# Patient Record
Sex: FEMALE | Race: BLACK OR AFRICAN AMERICAN | NOT HISPANIC OR LATINO | Employment: PART TIME | URBAN - METROPOLITAN AREA
[De-identification: names, ages, dates, MRNs, and addresses within clinical notes are randomized per-mention and may not be internally consistent; named-entity substitution may affect disease eponyms.]

---

## 2017-07-24 ENCOUNTER — HOSPITAL ENCOUNTER (EMERGENCY)
Facility: OTHER | Age: 32
Discharge: HOME | End: 2017-07-24
Attending: EMERGENCY MEDICINE
Payer: MEDICAID

## 2017-07-24 VITALS
HEIGHT: 65 IN | HEART RATE: 96 BPM | WEIGHT: 142 LBS | OXYGEN SATURATION: 99 % | RESPIRATION RATE: 18 BRPM | TEMPERATURE: 98 F | SYSTOLIC BLOOD PRESSURE: 142 MMHG | BODY MASS INDEX: 23.66 KG/M2 | DIASTOLIC BLOOD PRESSURE: 100 MMHG

## 2017-07-24 DIAGNOSIS — Z86.39 HISTORY OF GRAVES' DISEASE: ICD-10-CM

## 2017-07-24 DIAGNOSIS — G90.A POTS (POSTURAL ORTHOSTATIC TACHYCARDIA SYNDROME): ICD-10-CM

## 2017-07-24 DIAGNOSIS — R19.7 DIARRHEA, UNSPECIFIED TYPE: ICD-10-CM

## 2017-07-24 DIAGNOSIS — E87.6 HYPOKALEMIA: Primary | ICD-10-CM

## 2017-07-24 DIAGNOSIS — R07.89 CHEST WALL PAIN: ICD-10-CM

## 2017-07-24 LAB
ALBUMIN SERPL-MCNC: 4 G/DL (ref 3.3–5.5)
ALP SERPL-CCNC: 60 U/L (ref 42–141)
B-HCG UR QL: NEGATIVE
BILIRUB SERPL-MCNC: 1.8 MG/DL (ref 0.2–1.6)
BUN SERPL-MCNC: 13 MG/DL (ref 7–22)
CALCIUM SERPL-MCNC: 9.1 MG/DL (ref 8–10.3)
CHLORIDE SERPL-SCNC: 100 MMOL/L (ref 98–108)
CREAT SERPL-MCNC: 0.6 MG/DL (ref 0.6–1.2)
CTP QC/QA: YES
GLUCOSE SERPL-MCNC: 84 MG/DL (ref 73–118)
POC ALT (SGPT): 67 U/L (ref 10–47)
POC AST (SGOT): 73 U/L (ref 11–38)
POC TCO2: 24 MMOL/L (ref 18–33)
POTASSIUM BLD-SCNC: 3.5 MMOL/L (ref 3.6–5.1)
PROTEIN, POC: 7.6 G/DL (ref 6.4–8.1)
SODIUM BLD-SCNC: 135 MMOL/L (ref 128–145)
T4 FREE SERPL-MCNC: 1.44 NG/DL
TSH SERPL DL<=0.005 MIU/L-ACNC: <0.01 UIU/ML

## 2017-07-24 PROCEDURE — 80053 COMPREHEN METABOLIC PANEL: CPT

## 2017-07-24 PROCEDURE — 81025 URINE PREGNANCY TEST: CPT | Performed by: EMERGENCY MEDICINE

## 2017-07-24 PROCEDURE — 99284 EMERGENCY DEPT VISIT MOD MDM: CPT | Mod: 25

## 2017-07-24 PROCEDURE — 84439 ASSAY OF FREE THYROXINE: CPT

## 2017-07-24 PROCEDURE — 85025 COMPLETE CBC W/AUTO DIFF WBC: CPT

## 2017-07-24 PROCEDURE — 84443 ASSAY THYROID STIM HORMONE: CPT

## 2017-07-24 PROCEDURE — 25000003 PHARM REV CODE 250: Performed by: EMERGENCY MEDICINE

## 2017-07-24 PROCEDURE — 96360 HYDRATION IV INFUSION INIT: CPT

## 2017-07-24 PROCEDURE — 96361 HYDRATE IV INFUSION ADD-ON: CPT

## 2017-07-24 RX ORDER — POTASSIUM CHLORIDE 20 MEQ/1
20 TABLET, EXTENDED RELEASE ORAL DAILY
Qty: 5 TABLET | Refills: 0 | Status: SHIPPED | OUTPATIENT
Start: 2017-07-24 | End: 2017-07-29

## 2017-07-24 RX ORDER — BUTALBITAL, ACETAMINOPHEN AND CAFFEINE 50; 325; 40 MG/1; MG/1; MG/1
1 TABLET ORAL
Status: COMPLETED | OUTPATIENT
Start: 2017-07-24 | End: 2017-07-24

## 2017-07-24 RX ORDER — POTASSIUM CHLORIDE 20 MEQ/1
40 TABLET, EXTENDED RELEASE ORAL
Status: COMPLETED | OUTPATIENT
Start: 2017-07-24 | End: 2017-07-24

## 2017-07-24 RX ADMIN — BUTALBITAL, ACETAMINOPHEN, AND CAFFEINE 1 TABLET: 50; 325; 40 TABLET ORAL at 06:07

## 2017-07-24 RX ADMIN — SODIUM CHLORIDE, SODIUM LACTATE, POTASSIUM CHLORIDE, AND CALCIUM CHLORIDE 1000 ML: .6; .31; .03; .02 INJECTION, SOLUTION INTRAVENOUS at 05:07

## 2017-07-24 RX ADMIN — POTASSIUM CHLORIDE 40 MEQ: 1500 TABLET, EXTENDED RELEASE ORAL at 05:07

## 2017-07-24 RX ADMIN — SODIUM CHLORIDE, SODIUM LACTATE, POTASSIUM CHLORIDE, AND CALCIUM CHLORIDE 1000 ML: .6; .31; .03; .02 INJECTION, SOLUTION INTRAVENOUS at 04:07

## 2017-07-24 NOTE — ED PROVIDER NOTES
Encounter Date: 2017       History     Chief Complaint   Patient presents with    Diarrhea     reports three days, multiple compliant     31 y.o. female with past medical history of Graves' disease (has not been on prescription medicines for 2 years) presents to the emergency department complaining of acute diarrhea since Friday.  She reports having watery, nonbloody bowel movements nearly every hour since Friday.  She states she has had one meal since Friday and only a few sips of water he cut his eating and drinking worsens diarrhea.  She reports dizziness with standing since this morning.          Review of patient's allergies indicates:   Allergen Reactions    Vinyl ether Hives     Past Medical History:   Diagnosis Date    Thyroid disease     graves disease     Past Surgical History:   Procedure Laterality Date    BREAST SURGERY      Cysts removed     SECTION      fibroid removal  ,     breast    HEMANGIOMA EXCISION      right thigh     Family History   Problem Relation Age of Onset    Colon cancer Paternal Grandfather     Breast cancer Maternal Grandmother     Hypertension Maternal Grandmother     Hypertension Father     Throat cancer Father      labor Neg Hx     Stroke Neg Hx     Diabetes Neg Hx      Social History   Substance Use Topics    Smoking status: Never Smoker    Smokeless tobacco: Never Used    Alcohol use Yes      Comment: occasional     Review of Systems   Constitutional: Negative for chills and fever.   HENT: Negative.    Eyes: Negative.    Respiratory: Negative for cough, shortness of breath and stridor.    Cardiovascular: Negative for chest pain, palpitations and leg swelling.   Gastrointestinal: Positive for diarrhea. Negative for abdominal pain, nausea and vomiting.   Genitourinary: Negative for dysuria and hematuria.   Musculoskeletal: Negative.    Skin: Negative.    Neurological: Positive for dizziness. Negative for syncope and headaches.    Psychiatric/Behavioral: Negative.    All other systems reviewed and are negative.      Physical Exam     Initial Vitals   BP Pulse Resp Temp SpO2   07/24/17 1602 07/24/17 1602 07/24/17 1602 07/24/17 1604 07/24/17 1602   (!) 132/100 103 17 100.2 °F (37.9 °C) 98 %      MAP       07/24/17 1602       110.67         Physical Exam    Nursing note and vitals reviewed.  Constitutional: She appears well-developed and well-nourished. She is not diaphoretic. No distress.   HENT:   Head: Normocephalic and atraumatic.   Mouth/Throat: Oropharynx is clear and moist. No oropharyngeal exudate.   Eyes: EOM are normal. Pupils are equal, round, and reactive to light.   Neck: Normal range of motion. Neck supple.   Cardiovascular: Normal rate, regular rhythm and intact distal pulses.   No murmur heard.  Pulmonary/Chest: Breath sounds normal. No stridor. No respiratory distress.   Abdominal: Soft. Bowel sounds are normal. There is no tenderness.   Musculoskeletal: Normal range of motion. She exhibits no edema or tenderness.   Neurological: She is alert and oriented to person, place, and time. She has normal strength. No cranial nerve deficit.   Skin: Skin is warm and dry. No erythema. No pallor.   Psychiatric: She has a normal mood and affect.         ED Course   Procedures  Labs Reviewed   TSH - Abnormal; Notable for the following:        Result Value    TSH <0.010 (*)     All other components within normal limits   POCT CMP - Abnormal; Notable for the following:     ALT (SGPT), POC 67 (*)     AST (SGOT), POC 73 (*)     POC Creatinine 0.6 (*)     POC Potassium 3.5 (*)     Bilirubin 1.8 (*)     All other components within normal limits   T4, FREE   POCT URINE PREGNANCY   POCT CBC   POCT CMP             Medical Decision Making:   Clinical Tests:   Lab Tests: Ordered and Reviewed       <> Summary of Lab: White count 7.6 H&H 12.6 and 39.3 platelets 247 MCV 75 RDW 12.8  Radiological Study: Ordered and Reviewed                   ED Course      Labs Reviewed  Admission on 07/24/2017, Discharged on 07/24/2017   Component Date Value Ref Range Status    POC Preg Test, Ur 07/24/2017 Negative  Negative Final     Acceptable 07/24/2017 Yes   Final    TSH 07/24/2017 <0.010* 0.400 - 4.000 uIU/mL Final    Free T4 07/24/2017 1.44  0.71 - 1.51 ng/dL Final    Albumin, POC 07/24/2017 4.0  3.3 - 5.5 g/dL Final    Alkaline Phosphatase, POC 07/24/2017 60  42 - 141 U/L Final    ALT (SGPT), POC 07/24/2017 67* 10 - 47 U/L Final    AST (SGOT), POC 07/24/2017 73* 11 - 38 U/L Final    POC BUN 07/24/2017 13  7 - 22 mg/dL Final    Calcium, POC 07/24/2017 9.1  8.0 - 10.3 mg/dL Final    POC Chloride 07/24/2017 100  98 - 108 mmol/L Final    POC Creatinine 07/24/2017 0.6* 0.6 - 1.2 mg/dL Final    POC Glucose 07/24/2017 84  73 - 118 mg/dL Final    POC Potassium 07/24/2017 3.5* 3.6 - 5.1 mmol/L Final    POC Sodium 07/24/2017 135  128 - 145 mmol/L Final    Bilirubin 07/24/2017 1.8* 0.2 - 1.6 mg/dL Final    POC TCO2 07/24/2017 24  18 - 33 mmol/L Final    Protein 07/24/2017 7.6  6.4 - 8.1 g/dL Final        Imaging Reviewed    Imaging Results          X-Ray Chest PA And Lateral (Final result)  Result time 07/24/17 17:21:59    Final result by Gildardo Pete MD (07/24/17 17:21:59)                 Impression:        No evidence of acute intrathoracic disease.      Electronically signed by: GILDARDO PETE MD  Date:     07/24/17  Time:    17:21              Narrative:    Technique:  Chest PA and lateral radiographs    Comparison: None.    Findings:     Lung volumes are normal and symmetric. No pleural fluid or pneumothorax. The mediastinal structures are midline. The cardiac silhouette is normal in size. The osseous structures demonstrate no acute abnormality.                              Medications given in ED    Medications   lactated ringers bolus 1,000 mL (0 mLs Intravenous Stopped 7/24/17 5237)   lactated ringers bolus 1,000 mL (0 mLs Intravenous  Stopped 7/24/17 1846)   potassium chloride SA CR tablet 40 mEq (40 mEq Oral Given 7/24/17 7709)   butalbital-acetaminophen-caffeine -40 mg per tablet 1 tablet (1 tablet Oral Given 7/24/17 1843)     Discharge Medications     Discharge Medication List as of 7/24/2017  6:51 PM      START taking these medications    Details   potassium chloride SA (K-DUR,KLOR-CON) 20 MEQ tablet Take 1 tablet (20 mEq total) by mouth once daily., Starting Mon 7/24/2017, Until Sat 7/29/2017, Normal         CONTINUE these medications which have NOT CHANGED    Details   propylthiouracil (PTU) 50 mg Tab Take 1 tablet (50 mg total) by mouth once daily., Starting 5/5/2016, Until Discontinued, Normal                   Patient discharged to home in stable condition with instructions to:   1. Please take all meds as prescribed.  2. Follow-up with your primary care doctor   3. Return precautions discussed and patient and/or family/caretaker understands to return to the emergency room for any concerns including worsening of your current symptoms, fever, chills, night sweats, worsening pain, chest pain, shortness of breath, nausea, vomiting, diarrhea, bleeding, headache, difficulty talking, visual disturbances, weakness, numbness or any other acute concerns      Clinical Impression:   The primary encounter diagnosis was Hypokalemia. Diagnoses of Chest wall pain, Diarrhea, unspecified type, History of Graves' disease, and POTS (postural orthostatic tachycardia syndrome) were also pertinent to this visit.                           Brandon Negron MD  08/02/17 [id number]

## 2017-10-13 DIAGNOSIS — E05.00 GRAVES DISEASE: ICD-10-CM

## 2017-10-15 RX ORDER — PROPYLTHIOURACIL 50 MG/1
TABLET ORAL
Qty: 30 TABLET | Refills: 0 | Status: SHIPPED | OUTPATIENT
Start: 2017-10-15 | End: 2021-01-02 | Stop reason: ALTCHOICE

## 2018-03-09 ENCOUNTER — HOSPITAL ENCOUNTER (EMERGENCY)
Facility: OTHER | Age: 33
Discharge: HOME | End: 2018-03-09
Attending: EMERGENCY MEDICINE
Payer: MEDICAID

## 2018-03-09 VITALS
RESPIRATION RATE: 18 BRPM | TEMPERATURE: 99 F | DIASTOLIC BLOOD PRESSURE: 92 MMHG | BODY MASS INDEX: 24.16 KG/M2 | HEIGHT: 65 IN | OXYGEN SATURATION: 100 % | SYSTOLIC BLOOD PRESSURE: 137 MMHG | WEIGHT: 145 LBS | HEART RATE: 97 BPM

## 2018-03-09 DIAGNOSIS — K52.9 GASTROENTERITIS: Primary | ICD-10-CM

## 2018-03-09 DIAGNOSIS — B34.9 VIRAL SYNDROME: ICD-10-CM

## 2018-03-09 LAB
B-HCG UR QL: NEGATIVE
BILIRUBIN, POC UA: NEGATIVE
BLOOD, POC UA: ABNORMAL
CLARITY, POC UA: CLEAR
COLOR, POC UA: YELLOW
CTP QC/QA: YES
CTP QC/QA: YES
FLUAV AG NPH QL: NEGATIVE
FLUBV AG NPH QL: NEGATIVE
GLUCOSE, POC UA: NEGATIVE
KETONES, POC UA: ABNORMAL
LEUKOCYTE EST, POC UA: NEGATIVE
NITRITE, POC UA: NEGATIVE
PH UR STRIP: 6 [PH]
PROTEIN, POC UA: NEGATIVE
SPECIFIC GRAVITY, POC UA: 1.01
UROBILINOGEN, POC UA: 4 E.U./DL

## 2018-03-09 PROCEDURE — 25000003 PHARM REV CODE 250: Performed by: NURSE PRACTITIONER

## 2018-03-09 PROCEDURE — 81025 URINE PREGNANCY TEST: CPT | Performed by: EMERGENCY MEDICINE

## 2018-03-09 PROCEDURE — 99284 EMERGENCY DEPT VISIT MOD MDM: CPT

## 2018-03-09 PROCEDURE — 81003 URINALYSIS AUTO W/O SCOPE: CPT

## 2018-03-09 PROCEDURE — 87804 INFLUENZA ASSAY W/OPTIC: CPT

## 2018-03-09 RX ORDER — ONDANSETRON 4 MG/1
4 TABLET, ORALLY DISINTEGRATING ORAL
Status: COMPLETED | OUTPATIENT
Start: 2018-03-09 | End: 2018-03-09

## 2018-03-09 RX ORDER — DICYCLOMINE HYDROCHLORIDE 20 MG/1
20 TABLET ORAL EVERY 8 HOURS PRN
Qty: 20 TABLET | Refills: 0 | Status: SHIPPED | OUTPATIENT
Start: 2018-03-09 | End: 2021-01-02 | Stop reason: ALTCHOICE

## 2018-03-09 RX ORDER — BUTALBITAL, ACETAMINOPHEN AND CAFFEINE 50; 325; 40 MG/1; MG/1; MG/1
1 TABLET ORAL EVERY 6 HOURS PRN
Qty: 10 TABLET | Refills: 0 | Status: SHIPPED | OUTPATIENT
Start: 2018-03-09 | End: 2018-05-14

## 2018-03-09 RX ORDER — ONDANSETRON 4 MG/1
TABLET, ORALLY DISINTEGRATING ORAL
Status: DISPENSED
Start: 2018-03-09 | End: 2018-03-10

## 2018-03-09 RX ORDER — DICYCLOMINE HYDROCHLORIDE 20 MG/1
TABLET ORAL
Status: DISPENSED
Start: 2018-03-09 | End: 2018-03-10

## 2018-03-09 RX ORDER — METRONIDAZOLE 500 MG/1
500 TABLET ORAL 3 TIMES DAILY
Qty: 21 TABLET | Refills: 0 | Status: SHIPPED | OUTPATIENT
Start: 2018-03-09 | End: 2018-03-09 | Stop reason: CLARIF

## 2018-03-09 RX ORDER — ONDANSETRON 4 MG/1
4 TABLET, FILM COATED ORAL EVERY 6 HOURS PRN
Qty: 12 TABLET | Refills: 0 | Status: SHIPPED | OUTPATIENT
Start: 2018-03-09 | End: 2021-01-02 | Stop reason: ALTCHOICE

## 2018-03-09 RX ORDER — BUTALBITAL, ACETAMINOPHEN AND CAFFEINE 50; 325; 40 MG/1; MG/1; MG/1
1 TABLET ORAL
Status: COMPLETED | OUTPATIENT
Start: 2018-03-09 | End: 2018-03-09

## 2018-03-09 RX ORDER — DICYCLOMINE HYDROCHLORIDE 10 MG/1
20 CAPSULE ORAL
Status: COMPLETED | OUTPATIENT
Start: 2018-03-09 | End: 2018-03-09

## 2018-03-09 RX ORDER — BUTALBITAL, ACETAMINOPHEN AND CAFFEINE 50; 325; 40 MG/1; MG/1; MG/1
TABLET ORAL
Status: DISPENSED
Start: 2018-03-09 | End: 2018-03-10

## 2018-03-09 RX ADMIN — DICYCLOMINE HYDROCHLORIDE 20 MG: 10 CAPSULE ORAL at 04:03

## 2018-03-09 RX ADMIN — BUTALBITAL, ACETAMINOPHEN, AND CAFFEINE 1 TABLET: 50; 325; 40 TABLET ORAL at 05:03

## 2018-03-09 RX ADMIN — ONDANSETRON 4 MG: 4 TABLET, ORALLY DISINTEGRATING ORAL at 04:03

## 2018-03-09 NOTE — ED PROVIDER NOTES
"Encounter Date: 3/9/2018       History     Chief Complaint   Patient presents with    Diarrhea     pt states "I started having diarrhea and feeling nauseous yesterday" "I vomiting yesterday" pt denies taking any medication for symptoms    Headache     x 1 week    Generalized Body Aches     since yesterday    Abdominal Pain     x yesterday     The history is provided by the patient. No  was used.   Emesis    This is a new problem. The current episode started yesterday. The problem occurs 5 - 10 times per day. The problem has been unchanged. The emesis has an appearance of stomach contents and bilious material. Associated symptoms include abdominal pain (patient reports abdominal cramping began today along with the diarrhea.  Patient reports that the cramping is generalized and does not radiate.), diarrhea (Patient reports having loose stools beginning today approximately 2-4 times.) and headaches (Patient reports mild headache to the bilateral frontal region times one week that is similar to prior headaches without blurred vision or dizziness or chest pain or shortness of breath.). Pertinent negatives include no arthralgias, no chills, no cough, no fever, no myalgias, no sweats and no URI.     Review of patient's allergies indicates:   Allergen Reactions    Vinyl ether Hives     Past Medical History:   Diagnosis Date    Thyroid disease     graves disease     Past Surgical History:   Procedure Laterality Date    BREAST SURGERY      Cysts removed     SECTION      fibroid removal  ,     breast    HEMANGIOMA EXCISION      right thigh     Family History   Problem Relation Age of Onset    Colon cancer Paternal Grandfather     Breast cancer Maternal Grandmother     Hypertension Maternal Grandmother     Hypertension Father     Throat cancer Father      labor Neg Hx     Stroke Neg Hx     Diabetes Neg Hx      Social History   Substance Use Topics    Smoking " status: Never Smoker    Smokeless tobacco: Never Used    Alcohol use Yes      Comment: occasional     Review of Systems   Constitutional: Positive for fatigue. Negative for appetite change, chills, diaphoresis and fever.   HENT: Negative.  Negative for congestion, dental problem, postnasal drip, sinus pain, sinus pressure and sore throat.    Eyes: Negative.  Negative for pain, discharge, redness and itching.   Respiratory: Negative.  Negative for cough, shortness of breath and wheezing.    Cardiovascular: Negative.  Negative for chest pain and palpitations.   Gastrointestinal: Positive for abdominal pain (patient reports abdominal cramping began today along with the diarrhea.  Patient reports that the cramping is generalized and does not radiate.), diarrhea (Patient reports having loose stools beginning today approximately 2-4 times.) and vomiting. Negative for constipation and nausea.   Genitourinary: Negative.  Negative for difficulty urinating, dysuria, flank pain, menstrual problem, vaginal bleeding, vaginal discharge and vaginal pain.   Musculoskeletal: Negative.  Negative for arthralgias, back pain, myalgias and neck pain.   Skin: Negative.  Negative for rash.   Allergic/Immunologic: Negative.    Neurological: Positive for headaches (Patient reports mild headache to the bilateral frontal region times one week that is similar to prior headaches without blurred vision or dizziness or chest pain or shortness of breath.). Negative for dizziness, syncope, weakness and light-headedness.   Psychiatric/Behavioral: Negative.  Negative for hallucinations, self-injury and suicidal ideas.   All other systems reviewed and are negative.      Physical Exam     Initial Vitals [03/09/18 1606]   BP Pulse Resp Temp SpO2   (!) 126/96 92 18 99.3 °F (37.4 °C) 100 %      MAP       106         Physical Exam    Nursing note and vitals reviewed.  Constitutional: She appears well-developed and well-nourished. She is not diaphoretic.   Non-toxic appearance. She does not appear ill. No distress.   HENT:   Head: Normocephalic and atraumatic.   Eyes: Conjunctivae are normal. Right eye exhibits no discharge. Left eye exhibits no discharge.   Neck: Normal range of motion.   Cardiovascular: Normal rate, regular rhythm, normal heart sounds and intact distal pulses. Exam reveals no gallop and no friction rub.    No murmur heard.  Pulmonary/Chest: Breath sounds normal. No respiratory distress. She has no wheezes. She has no rhonchi. She has no rales. She exhibits no tenderness.   Abdominal: Soft. Normal appearance and bowel sounds are normal. She exhibits no shifting dullness, no distension, no pulsatile liver, no fluid wave, no abdominal bruit, no ascites, no pulsatile midline mass and no mass. There is no hepatosplenomegaly, splenomegaly or hepatomegaly. There is generalized tenderness. There is no rigidity, no rebound, no guarding, no CVA tenderness, no tenderness at McBurney's point and negative Tavares's sign. No hernia.   Musculoskeletal: Normal range of motion.   Neurological: She is alert and oriented to person, place, and time.   Skin: Skin is warm and dry. No rash noted.   Psychiatric: She has a normal mood and affect. Her behavior is normal. Judgment and thought content normal.         ED Course   Procedures  Labs Reviewed   POCT URINALYSIS W/O SCOPE - Abnormal; Notable for the following:        Result Value    Glucose, UA Negative (*)     Bilirubin, UA Negative (*)     Ketones, UA 2+ (*)     Blood, UA 1+ (*)     Protein, UA Negative (*)     Urobilinogen, UA 4.0 (*)     Nitrite, UA Negative (*)     Leukocytes, UA Negative (*)     All other components within normal limits   POCT URINE PREGNANCY   POCT URINALYSIS W/O SCOPE   POCT INFLUENZA A/B             Medical Decision Making:   Initial Assessment:   Gastroenteritis, viral syndrome  Differential Diagnosis:   Intractable nausea and vomiting, dehydration/orthostasis, UTI  Clinical Tests:   Lab  Tests: Ordered and Reviewed  The following lab test(s) were unremarkable: Urinalysis       <> Summary of Lab: Rapid flu test negative  ED Management:  Orthostatic vitals negative.  Patient given Bentyl by mouth, and Zofran by mouth and Fioricet by mouth.  Upon reassessment patient reports nausea and vomiting have resolved and abdominal cramping is improved and headache is also improved.  Patient still reports she has generalized body aches.  Patient will be discharged home on Bentyl and Zofran as well as Fioricet with instructions to rest, refrain from strenuous activity, eat a BRAT diet for the next 24-48 hours then advance to regular as tolerated, drink 6-8 glasses of water daily to prevent dehydration, and return to the ER as needed if symptoms worsen or fail to improve.  Patient is also instructed to take over-the-counter ibuprofen as needed for body aches.  Patient verbalized an understanding of discharge instructions and treatment plan.              Attending Attestation:     Physician Attestation Statement for NP/PA:   I reviewed the chart but I did not personally examine the patient. The face to face encounter was performed by the NP/PA.                     Clinical Impression:   The primary encounter diagnosis was Gastroenteritis. A diagnosis of Viral syndrome was also pertinent to this visit.                           Toussaint Battley III, FNP  03/09/18 1812       Sydnie Harrington MD  03/09/18 [id number]

## 2018-05-14 ENCOUNTER — HOSPITAL ENCOUNTER (EMERGENCY)
Facility: HOSPITAL | Age: 33
Discharge: HOME | End: 2018-05-14
Attending: EMERGENCY MEDICINE
Payer: MEDICAID

## 2018-05-14 VITALS
OXYGEN SATURATION: 100 % | DIASTOLIC BLOOD PRESSURE: 78 MMHG | HEART RATE: 99 BPM | RESPIRATION RATE: 20 BRPM | TEMPERATURE: 99 F | SYSTOLIC BLOOD PRESSURE: 136 MMHG

## 2018-05-14 DIAGNOSIS — J01.90 SUBACUTE SINUSITIS, UNSPECIFIED LOCATION: Primary | ICD-10-CM

## 2018-05-14 DIAGNOSIS — R51.9 NONINTRACTABLE HEADACHE, UNSPECIFIED CHRONICITY PATTERN, UNSPECIFIED HEADACHE TYPE: ICD-10-CM

## 2018-05-14 LAB
B-HCG UR QL: NEGATIVE
CTP QC/QA: YES
FLUAV AG NPH QL: NEGATIVE
FLUBV AG NPH QL: NEGATIVE
S PYO RRNA THROAT QL PROBE: NEGATIVE

## 2018-05-14 PROCEDURE — 81025 URINE PREGNANCY TEST: CPT | Performed by: EMERGENCY MEDICINE

## 2018-05-14 PROCEDURE — 25000003 PHARM REV CODE 250: Performed by: EMERGENCY MEDICINE

## 2018-05-14 PROCEDURE — 87880 STREP A ASSAY W/OPTIC: CPT

## 2018-05-14 PROCEDURE — 96372 THER/PROPH/DIAG INJ SC/IM: CPT

## 2018-05-14 PROCEDURE — 87804 INFLUENZA ASSAY W/OPTIC: CPT

## 2018-05-14 PROCEDURE — 63600175 PHARM REV CODE 636 W HCPCS: Performed by: EMERGENCY MEDICINE

## 2018-05-14 PROCEDURE — 99283 EMERGENCY DEPT VISIT LOW MDM: CPT | Mod: 25

## 2018-05-14 RX ORDER — ACETAMINOPHEN 325 MG/1
650 TABLET ORAL
Status: COMPLETED | OUTPATIENT
Start: 2018-05-14 | End: 2018-05-14

## 2018-05-14 RX ORDER — DEXAMETHASONE SODIUM PHOSPHATE 4 MG/ML
4 INJECTION, SOLUTION INTRA-ARTICULAR; INTRALESIONAL; INTRAMUSCULAR; INTRAVENOUS; SOFT TISSUE
Status: COMPLETED | OUTPATIENT
Start: 2018-05-14 | End: 2018-05-14

## 2018-05-14 RX ORDER — BUTALBITAL, ACETAMINOPHEN AND CAFFEINE 50; 325; 40 MG/1; MG/1; MG/1
1 TABLET ORAL EVERY 4 HOURS PRN
Qty: 15 TABLET | Refills: 0 | Status: SHIPPED | OUTPATIENT
Start: 2018-05-14 | End: 2018-06-13

## 2018-05-14 RX ORDER — LORATADINE 10 MG/1
10 TABLET ORAL DAILY
COMMUNITY
End: 2021-01-02 | Stop reason: ALTCHOICE

## 2018-05-14 RX ORDER — IBUPROFEN 200 MG
200 TABLET ORAL EVERY 6 HOURS PRN
COMMUNITY
End: 2021-01-02 | Stop reason: ALTCHOICE

## 2018-05-14 RX ORDER — AMOXICILLIN AND CLAVULANATE POTASSIUM 875; 125 MG/1; MG/1
1 TABLET, FILM COATED ORAL 2 TIMES DAILY
Qty: 20 TABLET | Refills: 0 | Status: SHIPPED | OUTPATIENT
Start: 2018-05-14 | End: 2018-05-24

## 2018-05-14 RX ORDER — KETOROLAC TROMETHAMINE 30 MG/ML
60 INJECTION, SOLUTION INTRAMUSCULAR; INTRAVENOUS
Status: COMPLETED | OUTPATIENT
Start: 2018-05-14 | End: 2018-05-14

## 2018-05-14 RX ADMIN — ACETAMINOPHEN 650 MG: 325 TABLET ORAL at 05:05

## 2018-05-14 RX ADMIN — KETOROLAC TROMETHAMINE 60 MG: 30 INJECTION, SOLUTION INTRAMUSCULAR; INTRAVENOUS at 05:05

## 2018-05-14 RX ADMIN — DEXAMETHASONE SODIUM PHOSPHATE 4 MG: 4 INJECTION, SOLUTION INTRAMUSCULAR; INTRAVENOUS at 05:05

## 2018-05-14 NOTE — ED PROVIDER NOTES
Encounter Date: 2018       History     Chief Complaint   Patient presents with    Headache     patient reports having a headache X1 month    Sore Throat     patient reports havong sore thoat X2 days, patient reports having a nasal drip     Nausea     Chief complaint:  Headache  32-year-old complains of pressure/headache to her forehead and sometimes her entire head.  This has been ongoing for over a month.  Patient reports sinus pressure as well as postnasal drip.  She has occasional nausea but no vomiting. No fever.  No photophobia.  Patient has been taking ibuprofen with some improvement.   she cannot elicit any aggravating factors for the headache.       The history is provided by the patient.     Review of patient's allergies indicates:   Allergen Reactions    Vinyl ether Hives     Past Medical History:   Diagnosis Date    Thyroid disease     graves disease     Past Surgical History:   Procedure Laterality Date    BREAST SURGERY      Cysts removed     SECTION      fibroid removal  ,     breast    HEMANGIOMA EXCISION  2004    right thigh     Family History   Problem Relation Age of Onset    Colon cancer Paternal Grandfather     Breast cancer Maternal Grandmother     Hypertension Maternal Grandmother     Hypertension Father     Throat cancer Father      labor Neg Hx     Stroke Neg Hx     Diabetes Neg Hx      Social History   Substance Use Topics    Smoking status: Never Smoker    Smokeless tobacco: Never Used    Alcohol use Yes      Comment: occasional     Review of Systems   Constitutional: Negative for fever.   HENT: Positive for congestion, postnasal drip, sinus pain and sinus pressure. Negative for sore throat.    Eyes: Negative for photophobia and visual disturbance.   Respiratory: Negative for shortness of breath.    Cardiovascular: Negative for chest pain.   Gastrointestinal: Positive for nausea.   Genitourinary: Negative for dysuria.   Musculoskeletal:  Negative for back pain and neck stiffness.   Skin: Negative for rash.   Neurological: Positive for headaches. Negative for weakness.       Physical Exam     Initial Vitals [05/14/18 1621]   BP Pulse Resp Temp SpO2   (!) 150/99 108 18 100.2 °F (37.9 °C) 100 %      MAP       116         Physical Exam    Nursing note and vitals reviewed.  Constitutional: She appears well-developed and well-nourished. No distress.   HENT:   Head: Normocephalic and atraumatic.   Right Ear: Tympanic membrane normal.   Left Ear: Tympanic membrane normal.   Nose: Mucosal edema present.   Eyes: Conjunctivae and EOM are normal. Pupils are equal, round, and reactive to light.   Neck: Trachea normal, normal range of motion and full passive range of motion without pain. Neck supple. No thyromegaly present. No neck rigidity.   Cardiovascular: Normal rate and regular rhythm.   Pulmonary/Chest: Breath sounds normal.   Abdominal: Soft. There is no tenderness. There is no rebound and no guarding.   Musculoskeletal: Normal range of motion.   Neurological: She is alert and oriented to person, place, and time. She has normal strength.   Skin: Skin is warm and dry.   Psychiatric: She has a normal mood and affect.         ED Course   Procedures  Labs Reviewed   POCT URINE PREGNANCY   POCT INFLUENZA A/B   POCT RAPID STREP A             Medical Decision Making:   Initial Assessment:   32-year-old who presents with headache and sinus pressure.  On exam patient does have tenderness over her sinuses.  Neck is supple.  She is in no distress  ED Management:  Patient will be treated for sinusitis as well as the headache.  Given Decadron and Toradol IM here.  Patient will be discharged on Augmentin.  She was advised to use Flonase and Zyrtec daily.                      Clinical Impression:   The primary encounter diagnosis was Subacute sinusitis, unspecified location. A diagnosis of Nonintractable headache, unspecified chronicity pattern, unspecified headache type  was also pertinent to this visit.                           Sydnie Harrington MD  05/14/18 [id number]

## 2018-09-14 ENCOUNTER — HOSPITAL ENCOUNTER (EMERGENCY)
Facility: HOSPITAL | Age: 33
Discharge: HOME | End: 2018-09-14
Attending: INTERNAL MEDICINE
Payer: MEDICAID

## 2018-09-14 VITALS
HEART RATE: 85 BPM | BODY MASS INDEX: 25.9 KG/M2 | OXYGEN SATURATION: 97 % | DIASTOLIC BLOOD PRESSURE: 94 MMHG | SYSTOLIC BLOOD PRESSURE: 147 MMHG | RESPIRATION RATE: 18 BRPM | HEIGHT: 67 IN | TEMPERATURE: 98 F | WEIGHT: 165 LBS

## 2018-09-14 DIAGNOSIS — H11.152 PINGUECULA OF LEFT EYE: Primary | ICD-10-CM

## 2018-09-14 PROCEDURE — 99283 EMERGENCY DEPT VISIT LOW MDM: CPT

## 2018-09-15 NOTE — ED PROVIDER NOTES
"Encounter Date: 2018       History     Chief Complaint   Patient presents with    left eye itching     pt c/o left eye "itching" and "gel" in eye, reports symptoms began today after use of contact lens last pm      31 y/o female which presents with left eye "gel." she went to a concert last night and then took her contacts out of her eyes around 2:30 this am. She woke up and noticed her eye felt funny and she had a gel look to her left eye. She denies drainage, eye pain, or change in vision. She denies fever, chills, chest pain or shortness of breath.       The history is provided by the patient.     Review of patient's allergies indicates:   Allergen Reactions    Vinyl ether Hives     Past Medical History:   Diagnosis Date    Thyroid disease     graves disease     Past Surgical History:   Procedure Laterality Date    BREAST SURGERY      Cysts removed     SECTION      fibroid removal  ,     breast    HEMANGIOMA EXCISION  2004    right thigh     Family History   Problem Relation Age of Onset    Colon cancer Paternal Grandfather     Breast cancer Maternal Grandmother     Hypertension Maternal Grandmother     Hypertension Father     Throat cancer Father      labor Neg Hx     Stroke Neg Hx     Diabetes Neg Hx      Social History     Tobacco Use    Smoking status: Never Smoker    Smokeless tobacco: Never Used   Substance Use Topics    Alcohol use: Yes     Comment: occasional    Drug use: No     Review of Systems   Constitutional: Negative for chills and fever.   HENT: Negative for drooling, ear pain, postnasal drip and rhinorrhea.    Eyes: Negative for photophobia, pain, discharge, redness, itching and visual disturbance.   Respiratory: Negative for chest tightness and shortness of breath.    Cardiovascular: Negative for chest pain.   All other systems reviewed and are negative.      Physical Exam     Initial Vitals [18]   BP Pulse Resp Temp SpO2   (!) 147/94 " "85 18 98.2 °F (36.8 °C) 97 %      MAP       --         Physical Exam    Nursing note and vitals reviewed.  Constitutional: She appears well-developed and well-nourished.   Eyes: EOM are normal. Pupils are equal, round, and reactive to light. Right eye exhibits no discharge. Left eye exhibits no discharge. No scleral icterus.   Thick, raised area of conjunctiva noted to the left lateral aspect of the eye- no erythema or drainage noted   Cardiovascular: Normal rate, regular rhythm, normal heart sounds and intact distal pulses. Exam reveals no gallop and no friction rub.    No murmur heard.  Pulmonary/Chest: Breath sounds normal. No respiratory distress. She has no wheezes. She has no rhonchi. She has no rales. She exhibits no tenderness.   Musculoskeletal: Normal range of motion. She exhibits no edema or tenderness.   Neurological: She is alert and oriented to person, place, and time. She has normal strength. GCS score is 15. GCS eye subscore is 4. GCS verbal subscore is 5. GCS motor subscore is 6.       ED Course   Procedures  Labs Reviewed - No data to display        Medical Decision Making:   Initial Assessment:   31 y/o female with "gel" to her eye. No other symptoms  Differential Diagnosis:   Corneal abrasion, corneal ulcer, conjunctivitis  ED Management:  Pt examined. Dr. Carrera asked to evaluate the patient. Through exam he diagnosed the patient with a pinguecula. The patient was given strict return precautions. The patient voiced understanding of all instructions.                       Clinical Impression:   The encounter diagnosis was Pinguecula of left eye.                             Rebekah Villaseñor, CORRINA  09/14/18 2134    "

## 2019-12-13 ENCOUNTER — TELEPHONE (OUTPATIENT)
Dept: TRANSPLANT | Facility: CLINIC | Age: 34
End: 2019-12-13

## 2019-12-13 DIAGNOSIS — Z00.5 EXAMINATION OF POTENTIAL DONOR OF ORGAN AND TISSUE: Primary | ICD-10-CM

## 2019-12-13 NOTE — TELEPHONE ENCOUNTER
Doreen Gibson called and stated that she is interested in becoming a living donor for her mom,Madhuri Rodriguez.  Medical and social history obtained.  No contraindications noted.  All questions answered.  Kidney donor information book emailed to patient for review. Instructed to contact me with questions or to schedule testing. Patient verbalized understanding.

## 2019-12-17 ENCOUNTER — LAB VISIT (OUTPATIENT)
Dept: LAB | Facility: HOSPITAL | Age: 34
End: 2019-12-17
Attending: NURSE PRACTITIONER

## 2019-12-17 DIAGNOSIS — Z00.5 EXAMINATION OF POTENTIAL DONOR OF ORGAN AND TISSUE: ICD-10-CM

## 2019-12-17 LAB — ABO + RH BLD: NORMAL

## 2019-12-17 PROCEDURE — 36415 COLL VENOUS BLD VENIPUNCTURE: CPT | Mod: PO,TXP

## 2019-12-17 PROCEDURE — 86901 BLOOD TYPING SEROLOGIC RH(D): CPT | Mod: TXP

## 2020-01-02 ENCOUNTER — TELEPHONE (OUTPATIENT)
Dept: TRANSPLANT | Facility: CLINIC | Age: 35
End: 2020-01-02

## 2020-01-02 NOTE — TELEPHONE ENCOUNTER
Patient notified that her blood type is O+, which is compatible with her mother. Scheduled for crossmatch on 1/6/20.

## 2020-01-06 ENCOUNTER — LAB VISIT (OUTPATIENT)
Dept: LAB | Facility: HOSPITAL | Age: 35
End: 2020-01-06
Attending: NURSE PRACTITIONER
Payer: COMMERCIAL

## 2020-01-06 DIAGNOSIS — Z00.5 EXAMINATION OF POTENTIAL DONOR OF ORGAN AND TISSUE: ICD-10-CM

## 2020-01-06 PROCEDURE — 81376 HLA II TYPING 1 LOCUS LR: CPT | Mod: 91,PO,TXP

## 2020-01-06 PROCEDURE — 81373 HLA I TYPING 1 LOCUS LR: CPT | Mod: 91,PO,TXP

## 2020-01-06 PROCEDURE — 81376 HLA II TYPING 1 LOCUS LR: CPT | Mod: PO,TXP,59

## 2020-01-06 PROCEDURE — 81373 HLA I TYPING 1 LOCUS LR: CPT | Mod: PO,TXP

## 2020-01-10 LAB — HLATY INTERPRETATION: NORMAL

## 2020-01-12 LAB
HLA DRB4 1: NORMAL
HLA SSO DNA TYPING CLASS I & II INTERPRETATION: NORMAL
HLA-A 1 SERO. EQUIV: 2
HLA-A 1: NORMAL
HLA-A 2 SERO. EQUIV: 30
HLA-A 2: NORMAL
HLA-B 1 SERO. EQUIV: 72
HLA-B 1: NORMAL
HLA-B 2 SERO. EQUIV: 58
HLA-B 2: NORMAL
HLA-BW 1 SERO. EQUIV: 6
HLA-BW 2 SERO. EQUIV: 4
HLA-C 1: NORMAL
HLA-C 2: NORMAL
HLA-CW 1 SERO. EQUIV: 2
HLA-CW 2 SERO. EQUIV: 10
HLA-DQ 1 SERO. EQUIV: 7
HLA-DQ 2 SERO. EQUIV: 5
HLA-DQB1 1: NORMAL
HLA-DQB1 2: NORMAL
HLA-DRB1 1 SERO. EQUIV: 8
HLA-DRB1 1: NORMAL
HLA-DRB1 2 SERO. EQUIV: 14
HLA-DRB1 2: NORMAL
HLA-DRB3 1: NORMAL
HLA-DRB3 2: NORMAL
HLA-DRB345 1 SERO. EQUIV: 52
HLA-DRB345 2 SERO. EQUIV: NORMAL
HLA-DRB4 2: NORMAL
HLA-DRB5 1: NORMAL
HLA-DRB5 2: NORMAL
SSDQB TESTING DATE: NORMAL
SSDRB TESTING DATE: NORMAL
SSOA TESTING DATE: NORMAL
SSOB TESTING DATE: NORMAL
SSOC TESTING DATE: NORMAL
SSODR TESTING DATE: NORMAL
TYSSO TESTING DATE: NORMAL

## 2020-01-13 ENCOUNTER — TELEPHONE (OUTPATIENT)
Dept: TRANSPLANT | Facility: CLINIC | Age: 35
End: 2020-01-13

## 2020-01-16 ENCOUNTER — TELEPHONE (OUTPATIENT)
Dept: TRANSPLANT | Facility: CLINIC | Age: 35
End: 2020-01-16

## 2020-01-16 NOTE — TELEPHONE ENCOUNTER
Patient notified that the results of the crossmatch with her mother shows that they are not compatible. Discussed the option of paired donation should the other donors not be approved. All questions were answered and patient verbalized understanding.

## 2021-04-15 ENCOUNTER — PATIENT MESSAGE (OUTPATIENT)
Dept: RESEARCH | Facility: HOSPITAL | Age: 36
End: 2021-04-15

## 2021-05-25 ENCOUNTER — CLINICAL SUPPORT (OUTPATIENT)
Dept: URGENT CARE | Facility: CLINIC | Age: 36
End: 2021-05-25
Payer: MEDICAID

## 2021-05-25 DIAGNOSIS — Z11.9 SPECIAL SCREENING EXAMINATION FOR INFECTIOUS DISEASES: Primary | ICD-10-CM

## 2021-05-25 LAB
CTP QC/QA: YES
SARS-COV-2 RDRP RESP QL NAA+PROBE: NEGATIVE

## 2021-05-25 PROCEDURE — U0002: ICD-10-PCS | Mod: QW,S$GLB,, | Performed by: PHYSICIAN ASSISTANT

## 2021-05-25 PROCEDURE — U0002 COVID-19 LAB TEST NON-CDC: HCPCS | Mod: QW,S$GLB,, | Performed by: PHYSICIAN ASSISTANT

## 2021-07-20 ENCOUNTER — CLINICAL SUPPORT (OUTPATIENT)
Dept: URGENT CARE | Facility: CLINIC | Age: 36
End: 2021-07-20
Payer: MEDICAID

## 2021-07-20 DIAGNOSIS — Z20.822 ENCOUNTER FOR LABORATORY TESTING FOR COVID-19 VIRUS: Primary | ICD-10-CM

## 2021-07-20 LAB
CTP QC/QA: YES
SARS-COV-2 RDRP RESP QL NAA+PROBE: NEGATIVE

## 2021-07-20 PROCEDURE — U0002: ICD-10-PCS | Mod: QW,S$GLB,, | Performed by: INTERNAL MEDICINE

## 2021-07-20 PROCEDURE — U0002 COVID-19 LAB TEST NON-CDC: HCPCS | Mod: QW,S$GLB,, | Performed by: INTERNAL MEDICINE

## 2021-08-23 ENCOUNTER — TELEPHONE (OUTPATIENT)
Dept: OBSTETRICS AND GYNECOLOGY | Facility: CLINIC | Age: 36
End: 2021-08-23

## 2021-08-23 ENCOUNTER — OFFICE VISIT (OUTPATIENT)
Dept: OBSTETRICS AND GYNECOLOGY | Facility: CLINIC | Age: 36
End: 2021-08-23
Payer: MEDICAID

## 2021-08-23 ENCOUNTER — HOSPITAL ENCOUNTER (OUTPATIENT)
Dept: RADIOLOGY | Facility: OTHER | Age: 36
Discharge: HOME | End: 2021-08-23
Attending: PHYSICIAN ASSISTANT
Payer: MEDICAID

## 2021-08-23 VITALS
BODY MASS INDEX: 30.27 KG/M2 | WEIGHT: 181.88 LBS | DIASTOLIC BLOOD PRESSURE: 82 MMHG | SYSTOLIC BLOOD PRESSURE: 118 MMHG

## 2021-08-23 DIAGNOSIS — Z32.01 POSITIVE PREGNANCY TEST: ICD-10-CM

## 2021-08-23 DIAGNOSIS — N93.9 ABNORMAL VAGINAL BLEEDING: ICD-10-CM

## 2021-08-23 DIAGNOSIS — N93.9 ABNORMAL VAGINAL BLEEDING: Primary | ICD-10-CM

## 2021-08-23 LAB
B-HCG UR QL: POSITIVE
CTP QC/QA: YES

## 2021-08-23 PROCEDURE — 81025 URINE PREGNANCY TEST: CPT | Mod: PBBFAC | Performed by: PHYSICIAN ASSISTANT

## 2021-08-23 PROCEDURE — 99999 PR PBB SHADOW E&M-EST. PATIENT-LVL II: CPT | Mod: PBBFAC,,, | Performed by: PHYSICIAN ASSISTANT

## 2021-08-23 PROCEDURE — 76817 TRANSVAGINAL US OBSTETRIC: CPT | Mod: 26,,, | Performed by: RADIOLOGY

## 2021-08-23 PROCEDURE — 99203 PR OFFICE/OUTPT VISIT, NEW, LEVL III, 30-44 MIN: ICD-10-PCS | Mod: S$PBB,TH,, | Performed by: PHYSICIAN ASSISTANT

## 2021-08-23 PROCEDURE — 87481 CANDIDA DNA AMP PROBE: CPT | Mod: 59 | Performed by: PHYSICIAN ASSISTANT

## 2021-08-23 PROCEDURE — 76817 TRANSVAGINAL US OBSTETRIC: CPT | Mod: TC

## 2021-08-23 PROCEDURE — 99212 OFFICE O/P EST SF 10 MIN: CPT | Mod: PBBFAC,TH,25 | Performed by: PHYSICIAN ASSISTANT

## 2021-08-23 PROCEDURE — 99203 OFFICE O/P NEW LOW 30 MIN: CPT | Mod: S$PBB,TH,, | Performed by: PHYSICIAN ASSISTANT

## 2021-08-23 PROCEDURE — 76817 US OB <14 WEEKS, TRANSABDOM & TRANSVAG, SINGLE GESTATION (XPD): ICD-10-PCS | Mod: 26,,, | Performed by: RADIOLOGY

## 2021-08-23 PROCEDURE — 99999 PR PBB SHADOW E&M-EST. PATIENT-LVL II: ICD-10-PCS | Mod: PBBFAC,,, | Performed by: PHYSICIAN ASSISTANT

## 2021-08-23 PROCEDURE — 76801 US OB <14 WEEKS, TRANSABDOM & TRANSVAG, SINGLE GESTATION (XPD): ICD-10-PCS | Mod: 26,,, | Performed by: RADIOLOGY

## 2021-08-23 PROCEDURE — 76801 OB US < 14 WKS SINGLE FETUS: CPT | Mod: 26,,, | Performed by: RADIOLOGY

## 2021-08-24 ENCOUNTER — INITIAL PRENATAL (OUTPATIENT)
Dept: OBSTETRICS AND GYNECOLOGY | Facility: CLINIC | Age: 36
End: 2021-08-24
Payer: MEDICAID

## 2021-08-24 VITALS — SYSTOLIC BLOOD PRESSURE: 130 MMHG | DIASTOLIC BLOOD PRESSURE: 88 MMHG

## 2021-08-24 DIAGNOSIS — N93.9 VAGINAL BLEEDING: ICD-10-CM

## 2021-08-24 DIAGNOSIS — Z32.01 POSITIVE PREGNANCY TEST: Primary | ICD-10-CM

## 2021-08-24 PROCEDURE — 99999 PR PBB SHADOW E&M-EST. PATIENT-LVL II: ICD-10-PCS | Mod: PBBFAC,,, | Performed by: OBSTETRICS & GYNECOLOGY

## 2021-08-24 PROCEDURE — 99213 PR OFFICE/OUTPT VISIT, EST, LEVL III, 20-29 MIN: ICD-10-PCS | Mod: S$PBB,TH,, | Performed by: OBSTETRICS & GYNECOLOGY

## 2021-08-24 PROCEDURE — 99999 PR PBB SHADOW E&M-EST. PATIENT-LVL II: CPT | Mod: PBBFAC,,, | Performed by: OBSTETRICS & GYNECOLOGY

## 2021-08-24 PROCEDURE — 87591 N.GONORRHOEAE DNA AMP PROB: CPT | Performed by: OBSTETRICS & GYNECOLOGY

## 2021-08-24 PROCEDURE — 99212 OFFICE O/P EST SF 10 MIN: CPT | Mod: PBBFAC,TH,PN | Performed by: OBSTETRICS & GYNECOLOGY

## 2021-08-24 PROCEDURE — 99213 OFFICE O/P EST LOW 20 MIN: CPT | Mod: S$PBB,TH,, | Performed by: OBSTETRICS & GYNECOLOGY

## 2021-08-24 PROCEDURE — 87491 CHLMYD TRACH DNA AMP PROBE: CPT | Performed by: OBSTETRICS & GYNECOLOGY

## 2021-08-25 ENCOUNTER — OFFICE VISIT (OUTPATIENT)
Dept: OBSTETRICS AND GYNECOLOGY | Facility: CLINIC | Age: 36
End: 2021-08-25
Payer: MEDICAID

## 2021-08-25 ENCOUNTER — TELEPHONE (OUTPATIENT)
Dept: OBSTETRICS AND GYNECOLOGY | Facility: CLINIC | Age: 36
End: 2021-08-25

## 2021-08-25 DIAGNOSIS — O20.0 THREATENED ABORTION: Primary | ICD-10-CM

## 2021-08-25 DIAGNOSIS — Z01.818 PRE-OP TESTING: Primary | ICD-10-CM

## 2021-08-25 DIAGNOSIS — O00.109 TUBAL ECTOPIC PREGNANCY, UNSPECIFIED LATERALITY, UNSPECIFIED WHETHER INTRAUTERINE PREGNANCY PRESENT: ICD-10-CM

## 2021-08-25 PROCEDURE — 99214 PR OFFICE/OUTPT VISIT, EST, LEVL IV, 30-39 MIN: ICD-10-PCS | Mod: TH,95,, | Performed by: OBSTETRICS & GYNECOLOGY

## 2021-08-25 PROCEDURE — 99214 OFFICE O/P EST MOD 30 MIN: CPT | Mod: TH,95,, | Performed by: OBSTETRICS & GYNECOLOGY

## 2021-08-26 ENCOUNTER — HOSPITAL ENCOUNTER (OUTPATIENT)
Dept: PREADMISSION TESTING | Facility: OTHER | Age: 36
Discharge: HOME | End: 2021-08-26
Attending: OBSTETRICS & GYNECOLOGY
Payer: MEDICAID

## 2021-08-26 ENCOUNTER — HOSPITAL ENCOUNTER (OUTPATIENT)
Dept: RADIOLOGY | Facility: OTHER | Age: 36
Discharge: HOME | End: 2021-08-26
Attending: OBSTETRICS & GYNECOLOGY
Payer: MEDICAID

## 2021-08-26 ENCOUNTER — ROUTINE PRENATAL (OUTPATIENT)
Dept: OBSTETRICS AND GYNECOLOGY | Facility: CLINIC | Age: 36
End: 2021-08-26
Payer: MEDICAID

## 2021-08-26 VITALS
WEIGHT: 179.88 LBS | SYSTOLIC BLOOD PRESSURE: 126 MMHG | BODY MASS INDEX: 29.94 KG/M2 | DIASTOLIC BLOOD PRESSURE: 80 MMHG

## 2021-08-26 DIAGNOSIS — O00.109 TUBAL ECTOPIC PREGNANCY, UNSPECIFIED LATERALITY, UNSPECIFIED WHETHER INTRAUTERINE PREGNANCY PRESENT: ICD-10-CM

## 2021-08-26 DIAGNOSIS — O20.0 THREATENED ABORTION: ICD-10-CM

## 2021-08-26 DIAGNOSIS — Z01.818 PRE-OP TESTING: ICD-10-CM

## 2021-08-26 DIAGNOSIS — O00.101 RIGHT TUBAL PREGNANCY WITHOUT INTRAUTERINE PREGNANCY: Primary | ICD-10-CM

## 2021-08-26 LAB
BACTERIAL VAGINOSIS DNA: POSITIVE
C TRACH DNA SPEC QL NAA+PROBE: NOT DETECTED
CANDIDA GLABRATA DNA: NEGATIVE
CANDIDA KRUSEI DNA: NEGATIVE
CANDIDA RRNA VAG QL PROBE: NEGATIVE
N GONORRHOEA DNA SPEC QL NAA+PROBE: NOT DETECTED
SARS-COV-2 RDRP RESP QL NAA+PROBE: NEGATIVE
T VAGINALIS RRNA GENITAL QL PROBE: NEGATIVE

## 2021-08-26 PROCEDURE — 99999 PR PBB SHADOW E&M-EST. PATIENT-LVL II: ICD-10-PCS | Mod: PBBFAC,,, | Performed by: OBSTETRICS & GYNECOLOGY

## 2021-08-26 PROCEDURE — 99214 OFFICE O/P EST MOD 30 MIN: CPT | Mod: S$PBB,TH,, | Performed by: OBSTETRICS & GYNECOLOGY

## 2021-08-26 PROCEDURE — 76817 TRANSVAGINAL US OBSTETRIC: CPT | Mod: TC

## 2021-08-26 PROCEDURE — U0002 COVID-19 LAB TEST NON-CDC: HCPCS | Performed by: OBSTETRICS & GYNECOLOGY

## 2021-08-26 PROCEDURE — 76801 OB US < 14 WKS SINGLE FETUS: CPT | Mod: 26,,, | Performed by: RADIOLOGY

## 2021-08-26 PROCEDURE — 76817 TRANSVAGINAL US OBSTETRIC: CPT | Mod: 26,,, | Performed by: RADIOLOGY

## 2021-08-26 PROCEDURE — 76817 US OB <14 WEEKS, TRANSABDOM & TRANSVAG, SINGLE GESTATION (XPD): ICD-10-PCS | Mod: 26,,, | Performed by: RADIOLOGY

## 2021-08-26 PROCEDURE — 99212 OFFICE O/P EST SF 10 MIN: CPT | Mod: PBBFAC,25,TH,PN | Performed by: OBSTETRICS & GYNECOLOGY

## 2021-08-26 PROCEDURE — 99999 PR PBB SHADOW E&M-EST. PATIENT-LVL II: CPT | Mod: PBBFAC,,, | Performed by: OBSTETRICS & GYNECOLOGY

## 2021-08-26 PROCEDURE — 99214 PR OFFICE/OUTPT VISIT, EST, LEVL IV, 30-39 MIN: ICD-10-PCS | Mod: S$PBB,TH,, | Performed by: OBSTETRICS & GYNECOLOGY

## 2021-08-26 PROCEDURE — 76801 US OB <14 WEEKS, TRANSABDOM & TRANSVAG, SINGLE GESTATION (XPD): ICD-10-PCS | Mod: 26,,, | Performed by: RADIOLOGY

## 2021-08-26 RX ORDER — HYDROCODONE BITARTRATE AND ACETAMINOPHEN 5; 325 MG/1; MG/1
1 TABLET ORAL EVERY 6 HOURS PRN
Qty: 30 TABLET | Refills: 0 | Status: ON HOLD | OUTPATIENT
Start: 2021-08-26 | End: 2021-08-27 | Stop reason: HOSPADM

## 2021-08-26 RX ORDER — MUPIROCIN 20 MG/G
OINTMENT TOPICAL
Status: CANCELLED | OUTPATIENT
Start: 2021-08-26

## 2021-08-26 RX ORDER — SODIUM CHLORIDE 9 MG/ML
INJECTION, SOLUTION INTRAVENOUS CONTINUOUS
Status: CANCELLED | OUTPATIENT
Start: 2021-08-26

## 2021-08-27 ENCOUNTER — ANESTHESIA EVENT (OUTPATIENT)
Dept: SURGERY | Facility: OTHER | Age: 36
End: 2021-08-27
Payer: MEDICAID

## 2021-08-27 ENCOUNTER — HOSPITAL ENCOUNTER (OUTPATIENT)
Facility: OTHER | Age: 36
Discharge: HOME | End: 2021-08-27
Attending: OBSTETRICS & GYNECOLOGY | Admitting: OBSTETRICS & GYNECOLOGY
Payer: MEDICAID

## 2021-08-27 ENCOUNTER — TELEPHONE (OUTPATIENT)
Dept: CARDIOLOGY | Facility: CLINIC | Age: 36
End: 2021-08-27

## 2021-08-27 ENCOUNTER — ANESTHESIA (OUTPATIENT)
Dept: SURGERY | Facility: OTHER | Age: 36
End: 2021-08-27
Payer: MEDICAID

## 2021-08-27 VITALS
RESPIRATION RATE: 16 BRPM | HEART RATE: 103 BPM | DIASTOLIC BLOOD PRESSURE: 80 MMHG | SYSTOLIC BLOOD PRESSURE: 132 MMHG | OXYGEN SATURATION: 100 % | TEMPERATURE: 98 F

## 2021-08-27 DIAGNOSIS — Z90.79 HX OF UNILATERAL SALPINGECTOMY: Primary | ICD-10-CM

## 2021-08-27 DIAGNOSIS — O00.101 RIGHT TUBAL PREGNANCY WITHOUT INTRAUTERINE PREGNANCY: ICD-10-CM

## 2021-08-27 DIAGNOSIS — I49.8 BIGEMINY: ICD-10-CM

## 2021-08-27 LAB
ABO + RH BLD: NORMAL
ANION GAP SERPL CALC-SCNC: 12 MMOL/L (ref 8–16)
BASOPHILS # BLD AUTO: 0.03 K/UL (ref 0–0.2)
BASOPHILS NFR BLD: 0.6 % (ref 0–1.9)
BLD GP AB SCN CELLS X3 SERPL QL: NORMAL
BUN SERPL-MCNC: 10 MG/DL (ref 6–20)
CALCIUM SERPL-MCNC: 9.1 MG/DL (ref 8.7–10.5)
CHLORIDE SERPL-SCNC: 104 MMOL/L (ref 95–110)
CO2 SERPL-SCNC: 21 MMOL/L (ref 23–29)
CREAT SERPL-MCNC: 0.7 MG/DL (ref 0.5–1.4)
DIFFERENTIAL METHOD: ABNORMAL
EOSINOPHIL # BLD AUTO: 0.2 K/UL (ref 0–0.5)
EOSINOPHIL NFR BLD: 3.9 % (ref 0–8)
ERYTHROCYTE [DISTWIDTH] IN BLOOD BY AUTOMATED COUNT: 14 % (ref 11.5–14.5)
EST. GFR  (AFRICAN AMERICAN): >60 ML/MIN/1.73 M^2
EST. GFR  (NON AFRICAN AMERICAN): >60 ML/MIN/1.73 M^2
GLUCOSE SERPL-MCNC: 103 MG/DL (ref 70–110)
HCT VFR BLD AUTO: 37.4 % (ref 37–48.5)
HGB BLD-MCNC: 11.8 G/DL (ref 12–16)
IMM GRANULOCYTES # BLD AUTO: 0.01 K/UL (ref 0–0.04)
IMM GRANULOCYTES NFR BLD AUTO: 0.2 % (ref 0–0.5)
LYMPHOCYTES # BLD AUTO: 2.2 K/UL (ref 1–4.8)
LYMPHOCYTES NFR BLD: 39.6 % (ref 18–48)
MCH RBC QN AUTO: 26.1 PG (ref 27–31)
MCHC RBC AUTO-ENTMCNC: 31.6 G/DL (ref 32–36)
MCV RBC AUTO: 83 FL (ref 82–98)
MONOCYTES # BLD AUTO: 0.5 K/UL (ref 0.3–1)
MONOCYTES NFR BLD: 9.4 % (ref 4–15)
NEUTROPHILS # BLD AUTO: 2.5 K/UL (ref 1.8–7.7)
NEUTROPHILS NFR BLD: 46.3 % (ref 38–73)
NRBC BLD-RTO: 0 /100 WBC
PLATELET # BLD AUTO: 328 K/UL (ref 150–450)
PMV BLD AUTO: 9.9 FL (ref 9.2–12.9)
POTASSIUM SERPL-SCNC: 4.2 MMOL/L (ref 3.5–5.1)
RBC # BLD AUTO: 4.52 M/UL (ref 4–5.4)
SODIUM SERPL-SCNC: 137 MMOL/L (ref 136–145)
T4 FREE SERPL-MCNC: 1.26 NG/DL (ref 0.71–1.51)
TSH SERPL DL<=0.005 MIU/L-ACNC: 0.96 UIU/ML (ref 0.4–4)
WBC # BLD AUTO: 5.43 K/UL (ref 3.9–12.7)

## 2021-08-27 PROCEDURE — 37000009 HC ANESTHESIA EA ADD 15 MINS: Performed by: OBSTETRICS & GYNECOLOGY

## 2021-08-27 PROCEDURE — 63600175 PHARM REV CODE 636 W HCPCS: Performed by: ANESTHESIOLOGY

## 2021-08-27 PROCEDURE — 25000003 PHARM REV CODE 250: Performed by: OBSTETRICS & GYNECOLOGY

## 2021-08-27 PROCEDURE — 25000003 PHARM REV CODE 250: Performed by: REGISTERED NURSE

## 2021-08-27 PROCEDURE — 71000039 HC RECOVERY, EACH ADD'L HOUR: Performed by: OBSTETRICS & GYNECOLOGY

## 2021-08-27 PROCEDURE — 27201423 OPTIME MED/SURG SUP & DEVICES STERILE SUPPLY: Performed by: OBSTETRICS & GYNECOLOGY

## 2021-08-27 PROCEDURE — 84439 ASSAY OF FREE THYROXINE: CPT | Performed by: OBSTETRICS & GYNECOLOGY

## 2021-08-27 PROCEDURE — 71000015 HC POSTOP RECOV 1ST HR: Performed by: OBSTETRICS & GYNECOLOGY

## 2021-08-27 PROCEDURE — 58120 DILATION AND CURETTAGE: CPT | Mod: 51,,, | Performed by: OBSTETRICS & GYNECOLOGY

## 2021-08-27 PROCEDURE — 93010 EKG 12-LEAD: ICD-10-PCS | Mod: ,,, | Performed by: INTERNAL MEDICINE

## 2021-08-27 PROCEDURE — 63600175 PHARM REV CODE 636 W HCPCS: Performed by: REGISTERED NURSE

## 2021-08-27 PROCEDURE — 88305 TISSUE EXAM BY PATHOLOGIST: CPT | Mod: 26,,, | Performed by: PATHOLOGY

## 2021-08-27 PROCEDURE — 88302 TISSUE EXAM BY PATHOLOGIST: CPT | Mod: 26,,, | Performed by: PATHOLOGY

## 2021-08-27 PROCEDURE — 00840 ANES IPER PX LOWER ABD NOS: CPT | Performed by: OBSTETRICS & GYNECOLOGY

## 2021-08-27 PROCEDURE — 58120 PR DILATION/CURETTAGE,DIAGNOSTIC: ICD-10-PCS | Mod: 51,,, | Performed by: OBSTETRICS & GYNECOLOGY

## 2021-08-27 PROCEDURE — 88305 TISSUE EXAM BY PATHOLOGIST: ICD-10-PCS | Mod: 26,,, | Performed by: PATHOLOGY

## 2021-08-27 PROCEDURE — 84443 ASSAY THYROID STIM HORMONE: CPT

## 2021-08-27 PROCEDURE — 88302 TISSUE EXAM BY PATHOLOGIST: CPT | Performed by: PATHOLOGY

## 2021-08-27 PROCEDURE — 93010 ELECTROCARDIOGRAM REPORT: CPT | Mod: ,,, | Performed by: INTERNAL MEDICINE

## 2021-08-27 PROCEDURE — 88305 TISSUE EXAM BY PATHOLOGIST: CPT | Performed by: PATHOLOGY

## 2021-08-27 PROCEDURE — 25000003 PHARM REV CODE 250: Performed by: ANESTHESIOLOGY

## 2021-08-27 PROCEDURE — 36415 COLL VENOUS BLD VENIPUNCTURE: CPT | Performed by: OBSTETRICS & GYNECOLOGY

## 2021-08-27 PROCEDURE — 80048 BASIC METABOLIC PNL TOTAL CA: CPT | Performed by: OBSTETRICS & GYNECOLOGY

## 2021-08-27 PROCEDURE — 36000709 HC OR TIME LEV III EA ADD 15 MIN: Performed by: OBSTETRICS & GYNECOLOGY

## 2021-08-27 PROCEDURE — 86900 BLOOD TYPING SEROLOGIC ABO: CPT | Performed by: OBSTETRICS & GYNECOLOGY

## 2021-08-27 PROCEDURE — 93005 ELECTROCARDIOGRAM TRACING: CPT

## 2021-08-27 PROCEDURE — 85025 COMPLETE CBC W/AUTO DIFF WBC: CPT | Performed by: OBSTETRICS & GYNECOLOGY

## 2021-08-27 PROCEDURE — 36000708 HC OR TIME LEV III 1ST 15 MIN: Performed by: OBSTETRICS & GYNECOLOGY

## 2021-08-27 PROCEDURE — 36415 COLL VENOUS BLD VENIPUNCTURE: CPT

## 2021-08-27 PROCEDURE — 25000003 PHARM REV CODE 250

## 2021-08-27 PROCEDURE — 58661 LAPAROSCOPY REMOVE ADNEXA: CPT | Mod: RT,,, | Performed by: OBSTETRICS & GYNECOLOGY

## 2021-08-27 PROCEDURE — 37000008 HC ANESTHESIA 1ST 15 MINUTES: Performed by: OBSTETRICS & GYNECOLOGY

## 2021-08-27 PROCEDURE — 71000033 HC RECOVERY, INTIAL HOUR: Performed by: OBSTETRICS & GYNECOLOGY

## 2021-08-27 PROCEDURE — 58661 PR LAP,RMV  ADNEXAL STRUCTURE: ICD-10-PCS | Mod: RT,,, | Performed by: OBSTETRICS & GYNECOLOGY

## 2021-08-27 PROCEDURE — S0030 INJECTION, METRONIDAZOLE: HCPCS

## 2021-08-27 PROCEDURE — 88302 PR  SURG PATH,LEVEL II: ICD-10-PCS | Mod: 26,,, | Performed by: PATHOLOGY

## 2021-08-27 PROCEDURE — 71000016 HC POSTOP RECOV ADDL HR: Performed by: OBSTETRICS & GYNECOLOGY

## 2021-08-27 RX ORDER — METRONIDAZOLE 500 MG/100ML
500 INJECTION, SOLUTION INTRAVENOUS
Status: DISCONTINUED | OUTPATIENT
Start: 2021-08-27 | End: 2021-08-27 | Stop reason: HOSPADM

## 2021-08-27 RX ORDER — ONDANSETRON 2 MG/ML
INJECTION INTRAMUSCULAR; INTRAVENOUS
Status: DISCONTINUED | OUTPATIENT
Start: 2021-08-27 | End: 2021-08-27

## 2021-08-27 RX ORDER — BUPIVACAINE HYDROCHLORIDE 2.5 MG/ML
INJECTION, SOLUTION EPIDURAL; INFILTRATION; INTRACAUDAL
Status: DISCONTINUED | OUTPATIENT
Start: 2021-08-27 | End: 2021-08-27 | Stop reason: HOSPADM

## 2021-08-27 RX ORDER — METRONIDAZOLE 500 MG/100ML
500 INJECTION, SOLUTION INTRAVENOUS ONCE
Status: DISCONTINUED | OUTPATIENT
Start: 2021-08-27 | End: 2021-08-27

## 2021-08-27 RX ORDER — IBUPROFEN 600 MG/1
600 TABLET ORAL EVERY 6 HOURS PRN
Qty: 30 TABLET | Refills: 1 | Status: SHIPPED | OUTPATIENT
Start: 2021-08-27 | End: 2021-09-30

## 2021-08-27 RX ORDER — METRONIDAZOLE 500 MG/100ML
500 INJECTION, SOLUTION INTRAVENOUS
Status: DISCONTINUED | OUTPATIENT
Start: 2021-08-27 | End: 2021-08-27

## 2021-08-27 RX ORDER — DEXAMETHASONE SODIUM PHOSPHATE 4 MG/ML
INJECTION, SOLUTION INTRA-ARTICULAR; INTRALESIONAL; INTRAMUSCULAR; INTRAVENOUS; SOFT TISSUE
Status: DISCONTINUED | OUTPATIENT
Start: 2021-08-27 | End: 2021-08-27

## 2021-08-27 RX ORDER — SODIUM CHLORIDE 0.9 % (FLUSH) 0.9 %
3 SYRINGE (ML) INJECTION
Status: DISCONTINUED | OUTPATIENT
Start: 2021-08-27 | End: 2021-08-27 | Stop reason: HOSPADM

## 2021-08-27 RX ORDER — OXYCODONE HYDROCHLORIDE 5 MG/1
5 TABLET ORAL
Status: DISCONTINUED | OUTPATIENT
Start: 2021-08-27 | End: 2021-08-27 | Stop reason: HOSPADM

## 2021-08-27 RX ORDER — MIDAZOLAM HYDROCHLORIDE 1 MG/ML
INJECTION INTRAMUSCULAR; INTRAVENOUS
Status: DISCONTINUED | OUTPATIENT
Start: 2021-08-27 | End: 2021-08-27

## 2021-08-27 RX ORDER — DIPHENHYDRAMINE HYDROCHLORIDE 50 MG/ML
INJECTION INTRAMUSCULAR; INTRAVENOUS
Status: DISCONTINUED | OUTPATIENT
Start: 2021-08-27 | End: 2021-08-27

## 2021-08-27 RX ORDER — PROPOFOL 10 MG/ML
VIAL (ML) INTRAVENOUS
Status: DISCONTINUED | OUTPATIENT
Start: 2021-08-27 | End: 2021-08-27

## 2021-08-27 RX ORDER — KETOROLAC TROMETHAMINE 30 MG/ML
INJECTION, SOLUTION INTRAMUSCULAR; INTRAVENOUS
Status: DISCONTINUED | OUTPATIENT
Start: 2021-08-27 | End: 2021-08-27

## 2021-08-27 RX ORDER — ACETAMINOPHEN 10 MG/ML
INJECTION, SOLUTION INTRAVENOUS
Status: DISCONTINUED | OUTPATIENT
Start: 2021-08-27 | End: 2021-08-27

## 2021-08-27 RX ORDER — NEOSTIGMINE METHYLSULFATE 1 MG/ML
INJECTION, SOLUTION INTRAVENOUS
Status: DISCONTINUED | OUTPATIENT
Start: 2021-08-27 | End: 2021-08-27

## 2021-08-27 RX ORDER — ONDANSETRON 2 MG/ML
4 INJECTION INTRAMUSCULAR; INTRAVENOUS DAILY PRN
Status: DISCONTINUED | OUTPATIENT
Start: 2021-08-27 | End: 2021-08-27 | Stop reason: HOSPADM

## 2021-08-27 RX ORDER — SODIUM CHLORIDE 9 MG/ML
INJECTION, SOLUTION INTRAVENOUS CONTINUOUS
Status: DISCONTINUED | OUTPATIENT
Start: 2021-08-27 | End: 2021-08-27 | Stop reason: HOSPADM

## 2021-08-27 RX ORDER — LIDOCAINE HCL/PF 100 MG/5ML
SYRINGE (ML) INTRAVENOUS
Status: DISCONTINUED | OUTPATIENT
Start: 2021-08-27 | End: 2021-08-27

## 2021-08-27 RX ORDER — FENTANYL CITRATE 50 UG/ML
INJECTION, SOLUTION INTRAMUSCULAR; INTRAVENOUS
Status: DISCONTINUED | OUTPATIENT
Start: 2021-08-27 | End: 2021-08-27

## 2021-08-27 RX ORDER — HYDROMORPHONE HYDROCHLORIDE 2 MG/ML
0.4 INJECTION, SOLUTION INTRAMUSCULAR; INTRAVENOUS; SUBCUTANEOUS EVERY 5 MIN PRN
Status: DISCONTINUED | OUTPATIENT
Start: 2021-08-27 | End: 2021-08-27 | Stop reason: HOSPADM

## 2021-08-27 RX ORDER — ROCURONIUM BROMIDE 10 MG/ML
INJECTION, SOLUTION INTRAVENOUS
Status: DISCONTINUED | OUTPATIENT
Start: 2021-08-27 | End: 2021-08-27

## 2021-08-27 RX ORDER — HYDROCODONE BITARTRATE AND ACETAMINOPHEN 5; 325 MG/1; MG/1
1 TABLET ORAL EVERY 6 HOURS PRN
Qty: 15 TABLET | Refills: 0 | Status: SHIPPED | OUTPATIENT
Start: 2021-08-27 | End: 2021-09-16 | Stop reason: SDUPTHER

## 2021-08-27 RX ORDER — MUPIROCIN 20 MG/G
OINTMENT TOPICAL
Status: DISCONTINUED | OUTPATIENT
Start: 2021-08-27 | End: 2021-08-27 | Stop reason: HOSPADM

## 2021-08-27 RX ORDER — MEPERIDINE HYDROCHLORIDE 25 MG/ML
12.5 INJECTION INTRAMUSCULAR; INTRAVENOUS; SUBCUTANEOUS ONCE AS NEEDED
Status: DISCONTINUED | OUTPATIENT
Start: 2021-08-27 | End: 2021-08-27 | Stop reason: HOSPADM

## 2021-08-27 RX ORDER — METRONIDAZOLE 500 MG/100ML
500 INJECTION, SOLUTION INTRAVENOUS ONCE
Status: COMPLETED | OUTPATIENT
Start: 2021-08-27 | End: 2021-08-27

## 2021-08-27 RX ADMIN — FENTANYL CITRATE 100 MCG: 50 INJECTION, SOLUTION INTRAMUSCULAR; INTRAVENOUS at 09:08

## 2021-08-27 RX ADMIN — MUPIROCIN: 20 OINTMENT TOPICAL at 07:08

## 2021-08-27 RX ADMIN — OXYCODONE HYDROCHLORIDE 5 MG: 5 TABLET ORAL at 02:08

## 2021-08-27 RX ADMIN — NEOSTIGMINE METHYLSULFATE 4 MG: 1 INJECTION INTRAVENOUS at 10:08

## 2021-08-27 RX ADMIN — PROPOFOL 150 MG: 10 INJECTION, EMULSION INTRAVENOUS at 09:08

## 2021-08-27 RX ADMIN — LIDOCAINE HYDROCHLORIDE 100 MG: 20 INJECTION, SOLUTION INTRAVENOUS at 09:08

## 2021-08-27 RX ADMIN — DIPHENHYDRAMINE HYDROCHLORIDE 6.25 MG: 50 INJECTION, SOLUTION INTRAMUSCULAR; INTRAVENOUS at 09:08

## 2021-08-27 RX ADMIN — DEXAMETHASONE SODIUM PHOSPHATE 4 MG: 4 INJECTION, SOLUTION INTRAMUSCULAR; INTRAVENOUS at 09:08

## 2021-08-27 RX ADMIN — ROCURONIUM BROMIDE 50 MG: 10 INJECTION, SOLUTION INTRAVENOUS at 09:08

## 2021-08-27 RX ADMIN — HYDROMORPHONE HYDROCHLORIDE 0.4 MG: 2 INJECTION INTRAMUSCULAR; INTRAVENOUS; SUBCUTANEOUS at 11:08

## 2021-08-27 RX ADMIN — MIDAZOLAM HYDROCHLORIDE 2 MG: 1 INJECTION, SOLUTION INTRAMUSCULAR; INTRAVENOUS at 09:08

## 2021-08-27 RX ADMIN — ONDANSETRON HYDROCHLORIDE 4 MG: 2 INJECTION INTRAMUSCULAR; INTRAVENOUS at 10:08

## 2021-08-27 RX ADMIN — METRONIDAZOLE 500 MG: 500 INJECTION, SOLUTION INTRAVENOUS at 09:08

## 2021-08-27 RX ADMIN — ACETAMINOPHEN 1000 MG: 10 INJECTION, SOLUTION INTRAVENOUS at 10:08

## 2021-08-27 RX ADMIN — GLYCOPYRROLATE 0.6 MG: 0.2 INJECTION, SOLUTION INTRAMUSCULAR; INTRAVITREAL at 10:08

## 2021-08-27 RX ADMIN — CARBOXYMETHYLCELLULOSE SODIUM 1 DROP: 2.5 SOLUTION/ DROPS OPHTHALMIC at 09:08

## 2021-08-27 RX ADMIN — KETOROLAC TROMETHAMINE 30 MG: 30 INJECTION, SOLUTION INTRAMUSCULAR; INTRAVENOUS at 10:08

## 2021-09-03 ENCOUNTER — TELEPHONE (OUTPATIENT)
Dept: INTERNAL MEDICINE | Facility: CLINIC | Age: 36
End: 2021-09-03

## 2021-09-03 LAB
FINAL PATHOLOGIC DIAGNOSIS: NORMAL
GROSS: NORMAL
Lab: NORMAL

## 2021-09-04 ENCOUNTER — PATIENT MESSAGE (OUTPATIENT)
Dept: OBSTETRICS AND GYNECOLOGY | Facility: CLINIC | Age: 36
End: 2021-09-04

## 2021-09-07 ENCOUNTER — TELEPHONE (OUTPATIENT)
Dept: OBSTETRICS AND GYNECOLOGY | Facility: CLINIC | Age: 36
End: 2021-09-07

## 2021-09-08 ENCOUNTER — LAB VISIT (OUTPATIENT)
Dept: LAB | Facility: HOSPITAL | Age: 36
End: 2021-09-08
Attending: OBSTETRICS & GYNECOLOGY
Payer: MEDICAID

## 2021-09-08 DIAGNOSIS — O00.90 ECTOPIC PREGNANCY, UNSPECIFIED LOCATION, UNSPECIFIED WHETHER INTRAUTERINE PREGNANCY PRESENT: ICD-10-CM

## 2021-09-08 LAB — HCG INTACT+B SERPL-ACNC: 4 MIU/ML

## 2021-09-08 PROCEDURE — 84702 CHORIONIC GONADOTROPIN TEST: CPT | Performed by: OBSTETRICS & GYNECOLOGY

## 2021-09-08 PROCEDURE — 36415 COLL VENOUS BLD VENIPUNCTURE: CPT | Performed by: OBSTETRICS & GYNECOLOGY

## 2021-09-09 ENCOUNTER — PATIENT MESSAGE (OUTPATIENT)
Dept: OBSTETRICS AND GYNECOLOGY | Facility: CLINIC | Age: 36
End: 2021-09-09

## 2021-09-10 ENCOUNTER — HOSPITAL ENCOUNTER (OUTPATIENT)
Dept: RADIOLOGY | Facility: OTHER | Age: 36
Discharge: HOME | End: 2021-09-10
Attending: OBSTETRICS & GYNECOLOGY
Payer: MEDICAID

## 2021-09-10 ENCOUNTER — OFFICE VISIT (OUTPATIENT)
Dept: OBSTETRICS AND GYNECOLOGY | Facility: CLINIC | Age: 36
End: 2021-09-10
Attending: OBSTETRICS & GYNECOLOGY
Payer: MEDICAID

## 2021-09-10 VITALS
DIASTOLIC BLOOD PRESSURE: 68 MMHG | SYSTOLIC BLOOD PRESSURE: 130 MMHG | WEIGHT: 177 LBS | HEIGHT: 65 IN | BODY MASS INDEX: 29.49 KG/M2

## 2021-09-10 DIAGNOSIS — Z09 POSTOP CHECK: Primary | ICD-10-CM

## 2021-09-10 DIAGNOSIS — R10.2 FEMALE PELVIC PAIN: ICD-10-CM

## 2021-09-10 PROBLEM — Z90.79 HX OF UNILATERAL SALPINGECTOMY: Status: RESOLVED | Noted: 2021-08-27 | Resolved: 2021-09-10

## 2021-09-10 PROBLEM — Z87.59 HISTORY OF ECTOPIC PREGNANCY: Status: ACTIVE | Noted: 2021-09-10

## 2021-09-10 PROCEDURE — 99999 PR PBB SHADOW E&M-EST. PATIENT-LVL III: ICD-10-PCS | Mod: PBBFAC,,, | Performed by: OBSTETRICS & GYNECOLOGY

## 2021-09-10 PROCEDURE — 99999 PR PBB SHADOW E&M-EST. PATIENT-LVL III: CPT | Mod: PBBFAC,,, | Performed by: OBSTETRICS & GYNECOLOGY

## 2021-09-10 PROCEDURE — 99024 POSTOP FOLLOW-UP VISIT: CPT | Mod: ,,, | Performed by: OBSTETRICS & GYNECOLOGY

## 2021-09-10 PROCEDURE — 81001 URINALYSIS AUTO W/SCOPE: CPT | Performed by: OBSTETRICS & GYNECOLOGY

## 2021-09-10 PROCEDURE — 99024 PR POST-OP FOLLOW-UP VISIT: ICD-10-PCS | Mod: ,,, | Performed by: OBSTETRICS & GYNECOLOGY

## 2021-09-10 PROCEDURE — 99213 OFFICE O/P EST LOW 20 MIN: CPT | Mod: PBBFAC | Performed by: OBSTETRICS & GYNECOLOGY

## 2021-09-10 PROCEDURE — 87086 URINE CULTURE/COLONY COUNT: CPT | Performed by: OBSTETRICS & GYNECOLOGY

## 2021-09-11 LAB
BACTERIA UR CULT: NO GROWTH
BILIRUB UR QL STRIP: NEGATIVE
CLARITY UR REFRACT.AUTO: ABNORMAL
COLOR UR AUTO: YELLOW
GLUCOSE UR QL STRIP: NEGATIVE
HGB UR QL STRIP: NEGATIVE
KETONES UR QL STRIP: NEGATIVE
LEUKOCYTE ESTERASE UR QL STRIP: NEGATIVE
MICROSCOPIC COMMENT: NORMAL
NITRITE UR QL STRIP: NEGATIVE
PH UR STRIP: 5 [PH] (ref 5–8)
PROT UR QL STRIP: NEGATIVE
SP GR UR STRIP: 1.03 (ref 1–1.03)
URN SPEC COLLECT METH UR: ABNORMAL

## 2021-09-14 ENCOUNTER — HOSPITAL ENCOUNTER (OUTPATIENT)
Dept: RADIOLOGY | Facility: OTHER | Age: 36
Discharge: HOME | End: 2021-09-14
Attending: OBSTETRICS & GYNECOLOGY
Payer: MEDICAID

## 2021-09-14 PROCEDURE — 76856 US EXAM PELVIC COMPLETE: CPT | Mod: 26,,, | Performed by: STUDENT IN AN ORGANIZED HEALTH CARE EDUCATION/TRAINING PROGRAM

## 2021-09-14 PROCEDURE — 76830 TRANSVAGINAL US NON-OB: CPT | Mod: 26,,, | Performed by: STUDENT IN AN ORGANIZED HEALTH CARE EDUCATION/TRAINING PROGRAM

## 2021-09-14 PROCEDURE — 76856 US EXAM PELVIC COMPLETE: CPT | Mod: TC

## 2021-09-14 PROCEDURE — 76830 US PELVIS COMP WITH TRANSVAG NON-OB (XPD): ICD-10-PCS | Mod: 26,,, | Performed by: STUDENT IN AN ORGANIZED HEALTH CARE EDUCATION/TRAINING PROGRAM

## 2021-09-14 PROCEDURE — 76856 US PELVIS COMP WITH TRANSVAG NON-OB (XPD): ICD-10-PCS | Mod: 26,,, | Performed by: STUDENT IN AN ORGANIZED HEALTH CARE EDUCATION/TRAINING PROGRAM

## 2021-09-16 ENCOUNTER — OFFICE VISIT (OUTPATIENT)
Dept: OBSTETRICS AND GYNECOLOGY | Facility: CLINIC | Age: 36
End: 2021-09-16
Payer: MEDICAID

## 2021-09-16 VITALS
DIASTOLIC BLOOD PRESSURE: 92 MMHG | SYSTOLIC BLOOD PRESSURE: 136 MMHG | BODY MASS INDEX: 29.9 KG/M2 | WEIGHT: 179.44 LBS | HEIGHT: 65 IN

## 2021-09-16 DIAGNOSIS — M54.9 BACK PAIN, UNSPECIFIED BACK LOCATION, UNSPECIFIED BACK PAIN LATERALITY, UNSPECIFIED CHRONICITY: ICD-10-CM

## 2021-09-16 DIAGNOSIS — G89.18 POST-OP PAIN: ICD-10-CM

## 2021-09-16 DIAGNOSIS — R10.9 ABDOMINAL CRAMPING: ICD-10-CM

## 2021-09-16 DIAGNOSIS — L76.82 INCISIONAL PAIN: Primary | ICD-10-CM

## 2021-09-16 DIAGNOSIS — R07.81 RIB PAIN: ICD-10-CM
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
## 2021-09-16 DIAGNOSIS — M79.604 PAIN OF RIGHT LOWER EXTREMITY: ICD-10-CM

## 2021-09-16 PROCEDURE — 99213 OFFICE O/P EST LOW 20 MIN: CPT | Mod: PBBFAC,PN | Performed by: OBSTETRICS & GYNECOLOGY

## 2021-09-16 PROCEDURE — 99999 PR PBB SHADOW E&M-EST. PATIENT-LVL III: CPT | Mod: PBBFAC,,, | Performed by: OBSTETRICS & GYNECOLOGY

## 2021-09-16 PROCEDURE — 87086 URINE CULTURE/COLONY COUNT: CPT | Performed by: OBSTETRICS & GYNECOLOGY

## 2021-09-16 PROCEDURE — 99999 PR PBB SHADOW E&M-EST. PATIENT-LVL III: ICD-10-PCS | Mod: PBBFAC,,, | Performed by: OBSTETRICS & GYNECOLOGY

## 2021-09-16 PROCEDURE — 99024 PR POST-OP FOLLOW-UP VISIT: ICD-10-PCS | Mod: ,,, | Performed by: OBSTETRICS & GYNECOLOGY

## 2021-09-16 PROCEDURE — 99024 POSTOP FOLLOW-UP VISIT: CPT | Mod: ,,, | Performed by: OBSTETRICS & GYNECOLOGY

## 2021-09-16 RX ORDER — HYDROCODONE BITARTRATE AND ACETAMINOPHEN 5; 325 MG/1; MG/1
1 TABLET ORAL EVERY 6 HOURS PRN
Qty: 10 TABLET | Refills: 0 | Status: SHIPPED | OUTPATIENT
Start: 2021-09-16 | End: 2021-09-30

## 2021-09-17 ENCOUNTER — OFFICE VISIT (OUTPATIENT)
Dept: CARDIOLOGY | Facility: CLINIC | Age: 36
End: 2021-09-17
Payer: MEDICAID

## 2021-09-17 VITALS
BODY MASS INDEX: 30.12 KG/M2 | SYSTOLIC BLOOD PRESSURE: 124 MMHG | WEIGHT: 181 LBS | DIASTOLIC BLOOD PRESSURE: 86 MMHG | HEART RATE: 65 BPM | OXYGEN SATURATION: 99 %

## 2021-09-17 DIAGNOSIS — I49.3 VENTRICULAR PREMATURE COMPLEXES: Primary | ICD-10-CM

## 2021-09-17 PROCEDURE — 93005 ELECTROCARDIOGRAM TRACING: CPT

## 2021-09-17 PROCEDURE — 99999 PR PBB SHADOW E&M-EST. PATIENT-LVL III: CPT | Mod: PBBFAC,,, | Performed by: INTERNAL MEDICINE

## 2021-09-17 PROCEDURE — 93010 EKG 12-LEAD: ICD-10-PCS | Mod: ,,, | Performed by: INTERNAL MEDICINE

## 2021-09-17 PROCEDURE — 99999 PR PBB SHADOW E&M-EST. PATIENT-LVL III: ICD-10-PCS | Mod: PBBFAC,,, | Performed by: INTERNAL MEDICINE

## 2021-09-17 PROCEDURE — 99203 PR OFFICE/OUTPT VISIT, NEW, LEVL III, 30-44 MIN: ICD-10-PCS | Mod: S$PBB,,, | Performed by: INTERNAL MEDICINE

## 2021-09-17 PROCEDURE — 99213 OFFICE O/P EST LOW 20 MIN: CPT | Mod: PBBFAC | Performed by: INTERNAL MEDICINE

## 2021-09-17 PROCEDURE — 99203 OFFICE O/P NEW LOW 30 MIN: CPT | Mod: S$PBB,,, | Performed by: INTERNAL MEDICINE

## 2021-09-17 PROCEDURE — 93010 ELECTROCARDIOGRAM REPORT: CPT | Mod: ,,, | Performed by: INTERNAL MEDICINE

## 2021-09-18 LAB
BACTERIA UR CULT: NORMAL
BACTERIA UR CULT: NORMAL

## 2021-09-23 ENCOUNTER — HOSPITAL ENCOUNTER (OUTPATIENT)
Dept: CARDIOLOGY | Facility: OTHER | Age: 36
Discharge: HOME | End: 2021-09-23
Attending: INTERNAL MEDICINE
Payer: MEDICAID

## 2021-09-23 VITALS
SYSTOLIC BLOOD PRESSURE: 124 MMHG | HEART RATE: 65 BPM | DIASTOLIC BLOOD PRESSURE: 86 MMHG | HEIGHT: 65 IN | BODY MASS INDEX: 30.16 KG/M2 | WEIGHT: 181 LBS

## 2021-09-23 DIAGNOSIS — I49.3 VENTRICULAR PREMATURE COMPLEXES: ICD-10-CM

## 2021-09-23 LAB
ASCENDING AORTA: 2.65 CM
AV INDEX (PROSTH): 0.77
AV MEAN GRADIENT: 3 MMHG
AV PEAK GRADIENT: 6 MMHG
AV VALVE AREA: 2.4 CM2
AV VELOCITY RATIO: 0.79
BSA FOR ECHO PROCEDURE: 1.94 M2
CV ECHO LV RWT: 0.41 CM
DOP CALC AO PEAK VEL: 1.21 M/S
DOP CALC AO VTI: 21.61 CM
DOP CALC LVOT AREA: 3.1 CM2
DOP CALC LVOT DIAMETER: 1.99 CM
DOP CALC LVOT PEAK VEL: 0.95 M/S
DOP CALC LVOT STROKE VOLUME: 51.85 CM3
DOP CALCLVOT PEAK VEL VTI: 16.68 CM
E WAVE DECELERATION TIME: 157.3 MSEC
E/A RATIO: 1.36
E/E' RATIO: 6.67 M/S
ECHO LV POSTERIOR WALL: 1 CM (ref 0.6–1.1)
EJECTION FRACTION: 65 %
FRACTIONAL SHORTENING: 36 % (ref 28–44)
INTERVENTRICULAR SEPTUM: 1.03 CM (ref 0.6–1.1)
IVRT: 96.12 MSEC
LA MAJOR: 5.02 CM
LA MINOR: 4.48 CM
LA WIDTH: 3.5 CM
LEFT ATRIUM SIZE: 3.57 CM
LEFT ATRIUM VOLUME INDEX MOD: 29.5 ML/M2
LEFT ATRIUM VOLUME INDEX: 26.5 ML/M2
LEFT ATRIUM VOLUME MOD: 56 CM3
LEFT ATRIUM VOLUME: 50.29 CM3
LEFT INTERNAL DIMENSION IN SYSTOLE: 3.12 CM (ref 2.1–4)
LEFT VENTRICLE DIASTOLIC VOLUME INDEX: 58.61 ML/M2
LEFT VENTRICLE DIASTOLIC VOLUME: 111.36 ML
LEFT VENTRICLE MASS INDEX: 94 G/M2
LEFT VENTRICLE SYSTOLIC VOLUME INDEX: 20.3 ML/M2
LEFT VENTRICLE SYSTOLIC VOLUME: 38.64 ML
LEFT VENTRICULAR INTERNAL DIMENSION IN DIASTOLE: 4.87 CM (ref 3.5–6)
LEFT VENTRICULAR MASS: 177.82 G
LV LATERAL E/E' RATIO: 6.67 M/S
LV SEPTAL E/E' RATIO: 6.67 M/S
MV PEAK A VEL: 0.44 M/S
MV PEAK E VEL: 0.6 M/S
MV STENOSIS PRESSURE HALF TIME: 45.62 MS
MV VALVE AREA P 1/2 METHOD: 4.82 CM2
PISA TR MAX VEL: 1.25 M/S
PV PEAK VELOCITY: 0.73 CM/S
RA MAJOR: 4.88 CM
RA PRESSURE: 3 MMHG
RA WIDTH: 3.92 CM
RIGHT VENTRICULAR END-DIASTOLIC DIMENSION: 2.57 CM
RV TISSUE DOPPLER FREE WALL SYSTOLIC VELOCITY 1 (APICAL 4 CHAMBER VIEW): 13.32 CM/S
SINUS: 3.02 CM
STJ: 2.63 CM
TDI LATERAL: 0.09 M/S
TDI SEPTAL: 0.09 M/S
TDI: 0.09 M/S
TR MAX PG: 6 MMHG
TRICUSPID ANNULAR PLANE SYSTOLIC EXCURSION: 2.22 CM
TV REST PULMONARY ARTERY PRESSURE: 9 MMHG

## 2021-09-23 PROCEDURE — 93306 TTE W/DOPPLER COMPLETE: CPT | Mod: 26,,, | Performed by: INTERNAL MEDICINE

## 2021-09-23 PROCEDURE — 93227 HOLTER MONITOR - 24 HOUR (CUPID ONLY): ICD-10-PCS | Mod: ,,, | Performed by: INTERNAL MEDICINE

## 2021-09-23 PROCEDURE — 93227 XTRNL ECG REC<48 HR R&I: CPT | Mod: ,,, | Performed by: INTERNAL MEDICINE

## 2021-09-23 PROCEDURE — 93225 XTRNL ECG REC<48 HRS REC: CPT

## 2021-09-23 PROCEDURE — 93306 ECHO (CUPID ONLY): ICD-10-PCS | Mod: 26,,, | Performed by: INTERNAL MEDICINE

## 2021-09-23 PROCEDURE — 93306 TTE W/DOPPLER COMPLETE: CPT

## 2021-09-29 LAB
OHS CV EVENT MONITOR DAY: 0
OHS CV HOLTER LENGTH DECIMAL HOURS: 24
OHS CV HOLTER LENGTH HOURS: 24
OHS CV HOLTER LENGTH MINUTES: 0
OHS CV HOLTER SINUS AVERAGE HR: 88
OHS CV HOLTER SINUS MAX HR: 126
OHS CV HOLTER SINUS MIN HR: 66

## 2021-09-30 ENCOUNTER — OFFICE VISIT (OUTPATIENT)
Dept: OBSTETRICS AND GYNECOLOGY | Facility: CLINIC | Age: 36
End: 2021-09-30
Payer: MEDICAID

## 2021-09-30 VITALS
SYSTOLIC BLOOD PRESSURE: 124 MMHG | BODY MASS INDEX: 29.51 KG/M2 | HEIGHT: 65 IN | DIASTOLIC BLOOD PRESSURE: 88 MMHG | WEIGHT: 177.13 LBS

## 2021-09-30 DIAGNOSIS — Z87.59 HISTORY OF ECTOPIC PREGNANCY: Primary | ICD-10-CM

## 2021-09-30 DIAGNOSIS — O09.299 PRIOR PREGNANCY WITH CONGENITAL CARDIAC DEFECT, ANTEPARTUM: ICD-10-CM

## 2021-09-30 DIAGNOSIS — Z31.9 PROCREATIVE MANAGEMENT: ICD-10-CM

## 2021-09-30 PROCEDURE — 99999 PR PBB SHADOW E&M-EST. PATIENT-LVL III: CPT | Mod: PBBFAC,,, | Performed by: OBSTETRICS & GYNECOLOGY

## 2021-09-30 PROCEDURE — 99999 PR PBB SHADOW E&M-EST. PATIENT-LVL III: ICD-10-PCS | Mod: PBBFAC,,, | Performed by: OBSTETRICS & GYNECOLOGY

## 2021-09-30 PROCEDURE — 99214 PR OFFICE/OUTPT VISIT, EST, LEVL IV, 30-39 MIN: ICD-10-PCS | Mod: S$PBB,,, | Performed by: OBSTETRICS & GYNECOLOGY

## 2021-09-30 PROCEDURE — 99213 OFFICE O/P EST LOW 20 MIN: CPT | Mod: PBBFAC,TH,PN | Performed by: OBSTETRICS & GYNECOLOGY

## 2021-09-30 PROCEDURE — 99214 OFFICE O/P EST MOD 30 MIN: CPT | Mod: S$PBB,,, | Performed by: OBSTETRICS & GYNECOLOGY

## 2021-11-29 ENCOUNTER — PATIENT MESSAGE (OUTPATIENT)
Dept: OBSTETRICS AND GYNECOLOGY | Facility: CLINIC | Age: 36
End: 2021-11-29
Payer: MEDICAID

## 2021-11-29 DIAGNOSIS — Z32.01 POSITIVE PREGNANCY TEST: Primary | ICD-10-CM

## 2021-11-29 DIAGNOSIS — Z36.3 ENCOUNTER FOR ANTENATAL SCREENING FOR MALFORMATIONS: ICD-10-CM

## 2021-11-30 ENCOUNTER — PATIENT MESSAGE (OUTPATIENT)
Dept: OBSTETRICS AND GYNECOLOGY | Facility: CLINIC | Age: 36
End: 2021-11-30
Payer: MEDICAID

## 2021-12-01 ENCOUNTER — TELEPHONE (OUTPATIENT)
Dept: OBSTETRICS AND GYNECOLOGY | Facility: HOSPITAL | Age: 36
End: 2021-12-01
Payer: MEDICAID

## 2021-12-01 ENCOUNTER — OFFICE VISIT (OUTPATIENT)
Dept: OBSTETRICS AND GYNECOLOGY | Facility: CLINIC | Age: 36
End: 2021-12-01
Payer: MEDICAID

## 2021-12-01 DIAGNOSIS — Z32.01 POSITIVE PREGNANCY TEST: Primary | ICD-10-CM

## 2021-12-01 DIAGNOSIS — Z87.59 HISTORY OF ECTOPIC PREGNANCY: ICD-10-CM

## 2021-12-01 DIAGNOSIS — Z90.79 H/O UNILATERAL SALPINGECTOMY: ICD-10-CM

## 2021-12-01 PROCEDURE — 99204 PR OFFICE/OUTPT VISIT, NEW, LEVL IV, 45-59 MIN: ICD-10-PCS | Mod: TH,95,, | Performed by: OBSTETRICS & GYNECOLOGY

## 2021-12-01 PROCEDURE — 99204 OFFICE O/P NEW MOD 45 MIN: CPT | Mod: TH,95,, | Performed by: OBSTETRICS & GYNECOLOGY

## 2021-12-01 RX ORDER — PROGESTERONE 200 MG/1
200 CAPSULE ORAL DAILY
Qty: 30 CAPSULE | Refills: 11 | Status: SHIPPED | OUTPATIENT
Start: 2021-12-01 | End: 2022-01-31

## 2021-12-03 ENCOUNTER — TELEPHONE (OUTPATIENT)
Dept: OBSTETRICS AND GYNECOLOGY | Facility: CLINIC | Age: 36
End: 2021-12-03
Payer: MEDICAID

## 2021-12-03 DIAGNOSIS — Z32.01 POSITIVE PREGNANCY TEST: Primary | ICD-10-CM

## 2021-12-08 ENCOUNTER — HOSPITAL ENCOUNTER (OUTPATIENT)
Dept: RADIOLOGY | Facility: OTHER | Age: 36
Discharge: HOME | End: 2021-12-08
Attending: OBSTETRICS & GYNECOLOGY
Payer: MEDICAID

## 2021-12-08 DIAGNOSIS — Z32.01 POSITIVE PREGNANCY TEST: ICD-10-CM

## 2021-12-08 PROCEDURE — 76801 US OB <14 WEEKS, TRANSABDOM & TRANSVAG, SINGLE GESTATION (XPD): ICD-10-PCS | Mod: 26,,, | Performed by: RADIOLOGY

## 2021-12-08 PROCEDURE — 76801 OB US < 14 WKS SINGLE FETUS: CPT | Mod: 26,,, | Performed by: RADIOLOGY

## 2021-12-08 PROCEDURE — 76817 TRANSVAGINAL US OBSTETRIC: CPT | Mod: TC

## 2021-12-08 PROCEDURE — 76817 US OB <14 WEEKS, TRANSABDOM & TRANSVAG, SINGLE GESTATION (XPD): ICD-10-PCS | Mod: 26,,, | Performed by: RADIOLOGY

## 2021-12-08 PROCEDURE — 76817 TRANSVAGINAL US OBSTETRIC: CPT | Mod: 26,,, | Performed by: RADIOLOGY

## 2021-12-29 ENCOUNTER — TELEPHONE (OUTPATIENT)
Dept: OBSTETRICS AND GYNECOLOGY | Facility: CLINIC | Age: 36
End: 2021-12-29
Payer: MEDICAID

## 2021-12-29 ENCOUNTER — PROCEDURE VISIT (OUTPATIENT)
Dept: OBSTETRICS AND GYNECOLOGY | Facility: CLINIC | Age: 36
End: 2021-12-29
Payer: MEDICAID

## 2021-12-29 ENCOUNTER — INITIAL PRENATAL (OUTPATIENT)
Dept: OBSTETRICS AND GYNECOLOGY | Facility: CLINIC | Age: 36
End: 2021-12-29
Payer: MEDICAID

## 2021-12-29 VITALS
WEIGHT: 183.44 LBS | SYSTOLIC BLOOD PRESSURE: 118 MMHG | BODY MASS INDEX: 30.52 KG/M2 | DIASTOLIC BLOOD PRESSURE: 68 MMHG

## 2021-12-29 DIAGNOSIS — O34.219 HISTORY OF CESAREAN SECTION COMPLICATING PREGNANCY: ICD-10-CM

## 2021-12-29 DIAGNOSIS — Z3A.08 8 WEEKS GESTATION OF PREGNANCY: Primary | ICD-10-CM

## 2021-12-29 DIAGNOSIS — Z36.3 ENCOUNTER FOR ANTENATAL SCREENING FOR MALFORMATIONS: ICD-10-CM

## 2021-12-29 PROCEDURE — 87591 N.GONORRHOEAE DNA AMP PROB: CPT | Performed by: OBSTETRICS & GYNECOLOGY

## 2021-12-29 PROCEDURE — 99999 PR PBB SHADOW E&M-EST. PATIENT-LVL II: ICD-10-PCS | Mod: PBBFAC,,, | Performed by: OBSTETRICS & GYNECOLOGY

## 2021-12-29 PROCEDURE — 99212 OFFICE O/P EST SF 10 MIN: CPT | Mod: PBBFAC,TH | Performed by: OBSTETRICS & GYNECOLOGY

## 2021-12-29 PROCEDURE — 76801 US OB/GYN PROCEDURE (VIEWPOINT): ICD-10-PCS | Mod: 26,S$PBB,, | Performed by: OBSTETRICS & GYNECOLOGY

## 2021-12-29 PROCEDURE — 99999 PR PBB SHADOW E&M-EST. PATIENT-LVL II: CPT | Mod: PBBFAC,,, | Performed by: OBSTETRICS & GYNECOLOGY

## 2021-12-29 PROCEDURE — 76801 OB US < 14 WKS SINGLE FETUS: CPT | Mod: PBBFAC | Performed by: OBSTETRICS & GYNECOLOGY

## 2021-12-29 PROCEDURE — 87491 CHLMYD TRACH DNA AMP PROBE: CPT | Performed by: OBSTETRICS & GYNECOLOGY

## 2021-12-29 PROCEDURE — 87086 URINE CULTURE/COLONY COUNT: CPT | Performed by: OBSTETRICS & GYNECOLOGY

## 2021-12-29 PROCEDURE — 99213 OFFICE O/P EST LOW 20 MIN: CPT | Mod: TH,S$PBB,, | Performed by: OBSTETRICS & GYNECOLOGY

## 2021-12-29 PROCEDURE — 99213 PR OFFICE/OUTPT VISIT, EST, LEVL III, 20-29 MIN: ICD-10-PCS | Mod: TH,S$PBB,, | Performed by: OBSTETRICS & GYNECOLOGY

## 2021-12-30 LAB — BACTERIA UR CULT: NORMAL

## 2022-01-06 LAB
C TRACH DNA SPEC QL NAA+PROBE: NOT DETECTED
N GONORRHOEA DNA SPEC QL NAA+PROBE: NOT DETECTED

## 2022-01-12 ENCOUNTER — ROUTINE PRENATAL (OUTPATIENT)
Dept: OBSTETRICS AND GYNECOLOGY | Facility: CLINIC | Age: 37
End: 2022-01-12
Payer: MEDICAID

## 2022-01-12 ENCOUNTER — PROCEDURE VISIT (OUTPATIENT)
Dept: OBSTETRICS AND GYNECOLOGY | Facility: CLINIC | Age: 37
End: 2022-01-12
Payer: MEDICAID

## 2022-01-12 DIAGNOSIS — Z3A.08 8 WEEKS GESTATION OF PREGNANCY: ICD-10-CM

## 2022-01-12 DIAGNOSIS — O02.1 MISSED ABORTION: Primary | ICD-10-CM

## 2022-01-12 PROCEDURE — 76801 OB US < 14 WKS SINGLE FETUS: CPT | Mod: PBBFAC | Performed by: OBSTETRICS & GYNECOLOGY

## 2022-01-12 PROCEDURE — 76801 US OB/GYN PROCEDURE (VIEWPOINT): ICD-10-PCS | Mod: 26,S$PBB,, | Performed by: OBSTETRICS & GYNECOLOGY

## 2022-01-12 PROCEDURE — 99214 OFFICE O/P EST MOD 30 MIN: CPT | Mod: S$PBB,TH,, | Performed by: OBSTETRICS & GYNECOLOGY

## 2022-01-12 PROCEDURE — 99214 PR OFFICE/OUTPT VISIT, EST, LEVL IV, 30-39 MIN: ICD-10-PCS | Mod: S$PBB,TH,, | Performed by: OBSTETRICS & GYNECOLOGY

## 2022-01-12 NOTE — PROGRESS NOTES
CC: Absence of menses    Doreen Rodriguez is a 36 y.o. female  presents with complaint of absence of menstruation.  She denies nausea/vomIting/abdominal pain/bleeding.  UPT is positive.   Us today confirms early MAB    US shows   Dating   ======   Ultrasound examination on: 2022   GA by U/S based upon: CRL   GA by U/S 6 w + 0 d   JACQUELIN by U/S: 2022   Assigned: based on ultrasound (CRL), selected on 2021   Assigned GA 7 w + 6 d   Assigned JACQUELIN: 2022     Assessment   ==========   Gestational sac: visualized   GS 28.9 mm 8w 0d Rempen   Location: intrauterine   Yolk sac: visualized   Amniotic sac: visualized   Embryo: visualized   CRL 3.4 mm 6w 0d [place]   Cardiac activity: absent     Maternal Structures   ===============   Uterus / Cervix   Uterus: Normal   Ovaries / Tubes / Adnexa   Rt ovary: Normal   Rt ovary D1 25 mm   Rt ovary D2 22 mm   Rt ovary D3 16 mm   Rt ovary Vol 4.5 cmÂ³   Lt ovary: Normal   Lt ovary D1 33 mm   Lt ovary D2 25 mm   Lt ovary D3 24 mm   Lt ovary Vol 10.3 cmÂ³   Lt ovarian corpus luteum: visualized   Lt ovarian corpus luteum D1 14.0 mm   Lt ovarian corpus luteum D2 13.0 mm   Lt ovarian corpus luteum D3 15.0 mm     Impression   =========   No embryonic cardiac activity is identified on today's study, and there has been minimal increase in size of the CRL since the last exam.   Because embryonic cardiac activity has been previously identified, today's findings are diagnostic for a nonviable IUP (missed AB).     Past Medical History:   Diagnosis Date    Graves disease      Past Surgical History:   Procedure Laterality Date    BREAST SURGERY      Cysts removed     SECTION      DIAGNOSTIC LAPAROSCOPY N/A 2021    Procedure: LAPAROSCOPY, DIAGNOSTIC, possible salpingostomy/ salpingectomy/  D& C;  Surgeon: Cheri Hunter MD;  Location: [facility];  Service: OB/GYN;  Laterality: N/A;    DILATION AND CURETTAGE OF UTERUS  2021    Procedure: DILATION AND  CURETTAGE, UTERUS;  Surgeon: Cheri Hunter MD;  Location: [facility] OR;  Service: OB/GYN;;    fibroid removal  ,     breast    HEMANGIOMA EXCISION      right thigh    LAPAROSCOPIC SALPINGECTOMY Right 2021    Procedure: SALPINGECTOMY, LAPAROSCOPIC;  Surgeon: Cheri Hunter MD;  Location: [facility] OR;  Service: OB/GYN;  Laterality: Right;     Social History     Socioeconomic History    Marital status:    Tobacco Use    Smoking status: Never Smoker    Smokeless tobacco: Never Used   Substance and Sexual Activity    Alcohol use: Yes     Comment: occasional    Drug use: No    Sexual activity: Yes     Partners: Male     Family History   Problem Relation Age of Onset    Colon cancer Paternal Grandfather     Breast cancer Maternal Grandmother     Hypertension Maternal Grandmother     Hypertension Father     Throat cancer Father     Pacemaker/defibrilator Mother     Hypertension Mother      labor Neg Hx     Stroke Neg Hx     Diabetes Neg Hx      OB History    Para Term  AB Living   3 1 1 0 1 1   SAB IAB Ectopic Multiple Live Births   0 0 1 0 1      # Outcome Date GA Lbr Misbah/2nd Weight Sex Delivery Anes PTL Lv   3 Current            2 Ectopic 21     ECTOPIC      1 Term 04/17/15 38w0d  2.92 kg (6 lb 7 oz) F CS-LTranv EPI N STEVEN      Complications: VSD (ventricular septal defect)       LMP  (LMP Unknown)     ROS:  GENERAL: Denies weight gain or weight loss. Feeling well overall.   SKIN: Denies rash or lesions.   HEAD: Denies head injury or headache.   NODES: Denies enlarged lymph nodes.   CHEST: Denies chest pain or shortness of breath.   CARDIOVASCULAR: Denies palpitations or left sided chest pain.   ABDOMEN: No abdominal pain, constipation, diarrhea, nausea, vomiting or rectal bleeding.   URINARY: No frequency, dysuria, hematuria, or burning on urination.  REPRODUCTIVE: See HPI.   BREASTS: The patient performs breast self-examination and denies pain, lumps, or  nipple discharge.   HEMATOLOGIC: No easy bruisability or excessive bleeding.   MUSCULOSKELETAL: Denies joint pain or swelling.   NEUROLOGIC: Denies syncope or weakness.   PSYCHIATRIC: Denies depression, anxiety or mood swings.    PE:   APPEARANCE: Well nourished, well developed, sad due to the news of a MAB            ASSESSMENT and PLAN:    ICD-10-CM ICD-9-CM    1. Missed   O02.1 632          Patient was counseled today on possible medical vs surgical management    Conservative management as well    Patient to notify the office to procede after some consideration

## 2022-01-18 ENCOUNTER — PATIENT MESSAGE (OUTPATIENT)
Dept: OBSTETRICS AND GYNECOLOGY | Facility: CLINIC | Age: 37
End: 2022-01-18
Payer: MEDICAID

## 2022-01-19 RX ORDER — MISOPROSTOL 200 UG/1
200 TABLET ORAL ONCE
Qty: 4 TABLET | Refills: 0 | Status: SHIPPED | OUTPATIENT
Start: 2022-01-19 | End: 2022-01-27

## 2022-01-20 ENCOUNTER — HOSPITAL ENCOUNTER (EMERGENCY)
Facility: OTHER | Age: 37
Discharge: HOME | End: 2022-01-20
Attending: EMERGENCY MEDICINE
Payer: MEDICAID

## 2022-01-20 VITALS
RESPIRATION RATE: 17 BRPM | DIASTOLIC BLOOD PRESSURE: 85 MMHG | TEMPERATURE: 98 F | SYSTOLIC BLOOD PRESSURE: 126 MMHG | HEIGHT: 65 IN | BODY MASS INDEX: 29.16 KG/M2 | HEART RATE: 67 BPM | WEIGHT: 175 LBS | OXYGEN SATURATION: 100 %

## 2022-01-20 DIAGNOSIS — N83.202 CYST OF LEFT OVARY: ICD-10-CM

## 2022-01-20 DIAGNOSIS — R10.2 PELVIC PAIN: ICD-10-CM

## 2022-01-20 DIAGNOSIS — O03.9 MISCARRIAGE: Primary | ICD-10-CM

## 2022-01-20 LAB
ALBUMIN SERPL BCP-MCNC: 3.6 G/DL (ref 3.5–5.2)
ALP SERPL-CCNC: 48 U/L (ref 55–135)
ALT SERPL W/O P-5'-P-CCNC: 5 U/L (ref 10–44)
ANION GAP SERPL CALC-SCNC: 9 MMOL/L (ref 8–16)
AST SERPL-CCNC: 11 U/L (ref 10–40)
BASOPHILS # BLD AUTO: 0.03 K/UL (ref 0–0.2)
BASOPHILS NFR BLD: 0.3 % (ref 0–1.9)
BILIRUB SERPL-MCNC: 0.6 MG/DL (ref 0.1–1)
BUN SERPL-MCNC: 10 MG/DL (ref 6–20)
CALCIUM SERPL-MCNC: 9.1 MG/DL (ref 8.7–10.5)
CHLORIDE SERPL-SCNC: 108 MMOL/L (ref 95–110)
CO2 SERPL-SCNC: 21 MMOL/L (ref 23–29)
CREAT SERPL-MCNC: 0.7 MG/DL (ref 0.5–1.4)
DIFFERENTIAL METHOD: ABNORMAL
EOSINOPHIL # BLD AUTO: 0.1 K/UL (ref 0–0.5)
EOSINOPHIL NFR BLD: 0.8 % (ref 0–8)
ERYTHROCYTE [DISTWIDTH] IN BLOOD BY AUTOMATED COUNT: 14.2 % (ref 11.5–14.5)
EST. GFR  (AFRICAN AMERICAN): >60 ML/MIN/1.73 M^2
EST. GFR  (NON AFRICAN AMERICAN): >60 ML/MIN/1.73 M^2
GLUCOSE SERPL-MCNC: 104 MG/DL (ref 70–110)
HCG INTACT+B SERPL-ACNC: 4404 MIU/ML
HCT VFR BLD AUTO: 35.5 % (ref 37–48.5)
HGB BLD-MCNC: 11.4 G/DL (ref 12–16)
IMM GRANULOCYTES # BLD AUTO: 0.02 K/UL (ref 0–0.04)
IMM GRANULOCYTES NFR BLD AUTO: 0.2 % (ref 0–0.5)
LYMPHOCYTES # BLD AUTO: 1.4 K/UL (ref 1–4.8)
LYMPHOCYTES NFR BLD: 14 % (ref 18–48)
MCH RBC QN AUTO: 25.8 PG (ref 27–31)
MCHC RBC AUTO-ENTMCNC: 32.1 G/DL (ref 32–36)
MCV RBC AUTO: 80 FL (ref 82–98)
MONOCYTES # BLD AUTO: 0.5 K/UL (ref 0.3–1)
MONOCYTES NFR BLD: 5.6 % (ref 4–15)
NEUTROPHILS # BLD AUTO: 7.6 K/UL (ref 1.8–7.7)
NEUTROPHILS NFR BLD: 79.1 % (ref 38–73)
NRBC BLD-RTO: 0 /100 WBC
PLATELET # BLD AUTO: 313 K/UL (ref 150–450)
PMV BLD AUTO: 9.3 FL (ref 9.2–12.9)
POTASSIUM SERPL-SCNC: 3.7 MMOL/L (ref 3.5–5.1)
PROT SERPL-MCNC: 7.2 G/DL (ref 6–8.4)
RBC # BLD AUTO: 4.42 M/UL (ref 4–5.4)
SODIUM SERPL-SCNC: 138 MMOL/L (ref 136–145)
WBC # BLD AUTO: 9.61 K/UL (ref 3.9–12.7)

## 2022-01-20 PROCEDURE — 25000003 PHARM REV CODE 250: Performed by: PHYSICIAN ASSISTANT

## 2022-01-20 PROCEDURE — 85025 COMPLETE CBC W/AUTO DIFF WBC: CPT | Performed by: PHYSICIAN ASSISTANT

## 2022-01-20 PROCEDURE — 63600175 PHARM REV CODE 636 W HCPCS: Performed by: PHYSICIAN ASSISTANT

## 2022-01-20 PROCEDURE — 99285 EMERGENCY DEPT VISIT HI MDM: CPT | Mod: 25

## 2022-01-20 PROCEDURE — 80053 COMPREHEN METABOLIC PANEL: CPT | Performed by: PHYSICIAN ASSISTANT

## 2022-01-20 PROCEDURE — 96361 HYDRATE IV INFUSION ADD-ON: CPT

## 2022-01-20 PROCEDURE — 84702 CHORIONIC GONADOTROPIN TEST: CPT | Performed by: PHYSICIAN ASSISTANT

## 2022-01-20 PROCEDURE — 96374 THER/PROPH/DIAG INJ IV PUSH: CPT

## 2022-01-20 RX ORDER — OXYCODONE HYDROCHLORIDE 5 MG/1
5 TABLET ORAL
Status: COMPLETED | OUTPATIENT
Start: 2022-01-20 | End: 2022-01-20

## 2022-01-20 RX ORDER — KETOROLAC TROMETHAMINE 30 MG/ML
15 INJECTION, SOLUTION INTRAMUSCULAR; INTRAVENOUS
Status: COMPLETED | OUTPATIENT
Start: 2022-01-20 | End: 2022-01-20

## 2022-01-20 RX ORDER — ACETAMINOPHEN 500 MG
1000 TABLET ORAL
Status: COMPLETED | OUTPATIENT
Start: 2022-01-20 | End: 2022-01-20

## 2022-01-20 RX ORDER — HYDROCODONE BITARTRATE AND ACETAMINOPHEN 5; 325 MG/1; MG/1
1 TABLET ORAL EVERY 4 HOURS PRN
Qty: 8 TABLET | Refills: 0 | Status: SHIPPED | OUTPATIENT
Start: 2022-01-20 | End: 2023-03-31

## 2022-01-20 RX ADMIN — KETOROLAC TROMETHAMINE 15 MG: 30 INJECTION, SOLUTION INTRAMUSCULAR; INTRAVENOUS at 08:01

## 2022-01-20 RX ADMIN — OXYCODONE 5 MG: 5 TABLET ORAL at 09:01

## 2022-01-20 RX ADMIN — SODIUM CHLORIDE 1000 ML: 0.9 INJECTION, SOLUTION INTRAVENOUS at 09:01

## 2022-01-20 RX ADMIN — ACETAMINOPHEN 1000 MG: 500 TABLET, FILM COATED ORAL at 08:01

## 2022-01-21 NOTE — ED TRIAGE NOTES
Patient was 12 weeks on Wed, went to have ultrasound and fetal found to have no heart beat, patient noted vaginal spotting on Sunday, was Rx cytotec and took last noted, since cytotec, patient noted heavy vaginal bleeding and clots. (+) lightheaded and dizziness, nausea

## 2022-01-21 NOTE — ED PROVIDER NOTES
Source of History:  Patient    Chief complaint:  Vaginal Bleeding (Pt ~12weeks pregnant with c/o bleeding, clots, and cramping after taking Cytotec yesterday)      HPI:  Doreen Rodriguez is a 36 y.o. female presenting to the emergency department by EMS with pelvic pain, vaginal pain and vaginal bleeding.  Patient was diagnosed with a missed  on ultrasound from .  She states she would be approximately 12 weeks today.  She states she took oral Cytotec yesterday.  She began to have cramping this morning that progressively worse throughout the day.  She has a heavy vaginal bleeding 2 hours prior to arrival.  She states she took over-the-counter Motrin for the pain but it is now unbearable.  She received fentanyl on route which slightly helped with the pain.  she is also feeling lightheaded.  This is the extent to the patients complaints today here in the emergency department.    ROS: As per HPI and below:  General: No fever.  No chills.  No fatigue.  ENT: No sore throat. No congestion.  Head: No headache.    Respiratory: No shortness of breath.  No cough.  Cardiovascular: No chest pain.  Abdomen: No abdominal pain.  No nausea or vomiting.  Genito-Urinary: + pelvic pain and vaginal bleeding   Neurologic: No focal weakness.  No numbness.  MSK: no back pain.  Integument: No rashes or lesions.  Allergy/immunology:  Not immunocompromised.    Review of patient's allergies indicates:   Allergen Reactions    Vinyl ether Hives       PMH:  As per HPI and below:  Past Medical History:   Diagnosis Date    Graves disease      Past Surgical History:   Procedure Laterality Date    BREAST SURGERY      Cysts removed     SECTION      DIAGNOSTIC LAPAROSCOPY N/A 2021    Procedure: LAPAROSCOPY, DIAGNOSTIC, possible salpingostomy/ salpingectomy/  D& C;  Surgeon: Cheri Hunter MD;  Location: [facility];  Service: OB/GYN;  Laterality: N/A;    DILATION AND CURETTAGE OF UTERUS  2021     "Procedure: DILATION AND CURETTAGE, UTERUS;  Surgeon: Cheri Hunter MD;  Location: [facility] OR;  Service: OB/GYN;;    fibroid removal  2004, 2006    breast    HEMANGIOMA EXCISION  2004    right thigh    LAPAROSCOPIC SALPINGECTOMY Right 8/27/2021    Procedure: SALPINGECTOMY, LAPAROSCOPIC;  Surgeon: Cheri Hunter MD;  Location: [facility] OR;  Service: OB/GYN;  Laterality: Right;       Social History     Tobacco Use    Smoking status: Never Smoker    Smokeless tobacco: Never Used   Substance Use Topics    Alcohol use: Yes     Comment: occasional    Drug use: No       Physical Exam:    BP (!) 141/98 (BP Location: Right arm, Patient Position: Lying)   Pulse 67   Temp 97.8 °F (36.6 °C) (Oral)   Resp 17   Ht 5' 5" (1.651 m)   Wt 79.4 kg (175 lb)   LMP  (LMP Unknown)   SpO2 100%   Breastfeeding No   BMI 29.12 kg/m²   Nursing note and vital signs reviewed.  Appearance: No acute distress. Appears uncomfortable.   Eyes: No conjunctival injection.  ENT: Oropharynx clear.    Chest/ Respiratory: Clear to auscultation bilaterally.  Good air movement.  No wheezes.  No rhonchi. No rales. No accessory muscle use.  Cardiovascular: Regular rate and rhythm.  No murmurs. No gallops. No rubs.  Abdomen: Soft.  Not distended.  Nontender.  No guarding.  No rebound. Non-peritoneal.  :  Exam performed with chaperone- moderate amount of active bleeding.  Few clots retrieved from vaginal canal.  Cervical os is open with active clots expelled.  Musculoskeletal: Good range of motion all joints.  No deformities.  Neck supple.  No meningismus.  Skin: No rashes seen.  Good turgor.  No abrasions.  No ecchymoses.  Neurologic: Motor intact.  Sensation intact.   Mental Status:  Alert and oriented x 3.  Appropriate, conversant.  Labs that have been ordered have been independently reviewed and interpreted by myself.    I decided to obtain the patient's medical records.    MDM/ Differential Dx:    36 y.o. female presenting to the ED by EMS " for severe pelvic pain and vaginal bleeding after taking Cytotec yesterday.  Patient is afebrile, nontoxic appearing hemodynamically stable.  She is actively bleeding on exam but certainly not hemorrhaging.  Cervical os is open.  Patient given multiple modes of analgesics.  H&H is stable.    ED Course as of 01/20/22 2159   Thu Jan 20, 2022 2158 Case discussed with OB Gyn-  Recommends obtaining repeat transvaginal ultrasound to determine amount of retained POC. [AG]   Care signed out to Dr. Birmingham pending ultrasound results.   ED Course User Index  [AG] Freddie White PA-C           Diagnostic Impression:    1. Miscarriage    2. Cyst of left ovary    3. Pelvic pain                  Freddie White PA-C  01/21/22 1001

## 2022-01-21 NOTE — ED PROVIDER NOTES
Discussed case with OB- US not suggestive of ectopic given prior imaging L ovarian cyst, and reassuring without retained POC. Given pain improved, pt stable for dc home with repeat hcg testing in 72 hours, dc home with analgesia.     Jolly Birmingham MD  01/20/22 2117

## 2022-01-24 ENCOUNTER — PATIENT MESSAGE (OUTPATIENT)
Dept: MATERNAL FETAL MEDICINE | Facility: CLINIC | Age: 37
End: 2022-01-24
Payer: MEDICAID

## 2022-01-26 ENCOUNTER — TELEPHONE (OUTPATIENT)
Dept: OBSTETRICS AND GYNECOLOGY | Facility: CLINIC | Age: 37
End: 2022-01-26
Payer: MEDICAID

## 2022-01-26 DIAGNOSIS — O20.0 THREATENED ABORTION: Primary | ICD-10-CM

## 2022-01-31 ENCOUNTER — OFFICE VISIT (OUTPATIENT)
Dept: OBSTETRICS AND GYNECOLOGY | Facility: CLINIC | Age: 37
End: 2022-01-31
Payer: MEDICAID

## 2022-01-31 VITALS
BODY MASS INDEX: 30.01 KG/M2 | DIASTOLIC BLOOD PRESSURE: 90 MMHG | WEIGHT: 180.13 LBS | SYSTOLIC BLOOD PRESSURE: 120 MMHG | HEIGHT: 65 IN

## 2022-01-31 DIAGNOSIS — O02.1 MISSED ABORTION: Primary | ICD-10-CM

## 2022-01-31 PROCEDURE — 3074F PR MOST RECENT SYSTOLIC BLOOD PRESSURE < 130 MM HG: ICD-10-PCS | Mod: CPTII,,, | Performed by: OBSTETRICS & GYNECOLOGY

## 2022-01-31 PROCEDURE — 99999 PR PBB SHADOW E&M-EST. PATIENT-LVL III: ICD-10-PCS | Mod: PBBFAC,,, | Performed by: OBSTETRICS & GYNECOLOGY

## 2022-01-31 PROCEDURE — 1160F PR REVIEW ALL MEDS BY PRESCRIBER/CLIN PHARMACIST DOCUMENTED: ICD-10-PCS | Mod: CPTII,,, | Performed by: OBSTETRICS & GYNECOLOGY

## 2022-01-31 PROCEDURE — 3008F BODY MASS INDEX DOCD: CPT | Mod: CPTII,,, | Performed by: OBSTETRICS & GYNECOLOGY

## 2022-01-31 PROCEDURE — 3080F DIAST BP >= 90 MM HG: CPT | Mod: CPTII,,, | Performed by: OBSTETRICS & GYNECOLOGY

## 2022-01-31 PROCEDURE — 3074F SYST BP LT 130 MM HG: CPT | Mod: CPTII,,, | Performed by: OBSTETRICS & GYNECOLOGY

## 2022-01-31 PROCEDURE — 99214 OFFICE O/P EST MOD 30 MIN: CPT | Mod: S$PBB,TH,, | Performed by: OBSTETRICS & GYNECOLOGY

## 2022-01-31 PROCEDURE — 1159F MED LIST DOCD IN RCRD: CPT | Mod: CPTII,,, | Performed by: OBSTETRICS & GYNECOLOGY

## 2022-01-31 PROCEDURE — 99213 OFFICE O/P EST LOW 20 MIN: CPT | Mod: PBBFAC,TH | Performed by: OBSTETRICS & GYNECOLOGY

## 2022-01-31 PROCEDURE — 1159F PR MEDICATION LIST DOCUMENTED IN MEDICAL RECORD: ICD-10-PCS | Mod: CPTII,,, | Performed by: OBSTETRICS & GYNECOLOGY

## 2022-01-31 PROCEDURE — 99214 PR OFFICE/OUTPT VISIT, EST, LEVL IV, 30-39 MIN: ICD-10-PCS | Mod: S$PBB,TH,, | Performed by: OBSTETRICS & GYNECOLOGY

## 2022-01-31 PROCEDURE — 99999 PR PBB SHADOW E&M-EST. PATIENT-LVL III: CPT | Mod: PBBFAC,,, | Performed by: OBSTETRICS & GYNECOLOGY

## 2022-01-31 PROCEDURE — 3008F PR BODY MASS INDEX (BMI) DOCUMENTED: ICD-10-PCS | Mod: CPTII,,, | Performed by: OBSTETRICS & GYNECOLOGY

## 2022-01-31 PROCEDURE — 3080F PR MOST RECENT DIASTOLIC BLOOD PRESSURE >= 90 MM HG: ICD-10-PCS | Mod: CPTII,,, | Performed by: OBSTETRICS & GYNECOLOGY

## 2022-01-31 PROCEDURE — 1160F RVW MEDS BY RX/DR IN RCRD: CPT | Mod: CPTII,,, | Performed by: OBSTETRICS & GYNECOLOGY

## 2022-01-31 NOTE — PROGRESS NOTES
CC: Missed     Doreen Rodriguez is a 36 y.o. female  presents for follow up from a [facility]  Bleeding is stable.  BHCG down to 78.  Bleeding and cramping are minimal. She passed tissue and a sac per patient at home    Last US in [place]  showed    COMPARISON:  None.     FINDINGS:  The uterus measures 3.9 x 5.9 x 5.0 cm.  Endometrial stripe complex appears unremarkable.  There is no intrauterine gestational sac or fluid collection.     The right ovary measures 1.9 x 1.7 x 1.5 cm.     The left ovary measures 2.2 x 2.6 x 2.1 cm.  There is a 1.8 x 1.6 x 1.6 cm fluid collection with hypervascular ring surrounding it appearing to be within the left ovary.     No adnexal mass or free fluid is evident.     Impression:     No intrauterine gestation identified.     Cystic structure in the left ovary with hypervascular ring.  Ovarian ectopic pregnancy is not excluded.  Correlate with serial beta HCG and follow-up ultrasound as clinically warranted.     This report was flagged in Epic as abnormal.    Past Medical History:   Diagnosis Date    Graves disease        Past Surgical History:   Procedure Laterality Date    BREAST SURGERY      Cysts removed     SECTION      DIAGNOSTIC LAPAROSCOPY N/A 2021    Procedure: LAPAROSCOPY, DIAGNOSTIC, possible salpingostomy/ salpingectomy/  D& C;  Surgeon: Cheri Hunter MD;  Location: [facility];  Service: OB/GYN;  Laterality: N/A;    DILATION AND CURETTAGE OF UTERUS  2021    Procedure: DILATION AND CURETTAGE, UTERUS;  Surgeon: Cheri Hunter MD;  [facility];  Service: OB/GYN;;    fibroid removal  ,     breast    HEMANGIOMA EXCISION      right thigh    LAPAROSCOPIC SALPINGECTOMY Right 2021    Procedure: SALPINGECTOMY, LAPAROSCOPIC;  Surgeon: Cheri Hunter MD;  [facility];  Service: OB/GYN;  Laterality: Right;       OB History    Para Term  AB Living   3 1 1 0 1 1   SAB IAB Ectopic Multiple Live Births   0  "0 1 0 1      # Outcome Date GA Lbr Misbah/2nd Weight Sex Delivery Anes PTL Lv   3             2 Ectopic 21     ECTOPIC      1 Term 04/17/15 38w0d  2.92 kg (6 lb 7 oz) F CS-LTranv EPI N STEVEN      Complications: VSD (ventricular septal defect)       Family History   Problem Relation Age of Onset    Colon cancer Paternal Grandfather     Breast cancer Maternal Grandmother     Hypertension Maternal Grandmother     Hypertension Father     Throat cancer Father     Pacemaker/defibrilator Mother     Hypertension Mother      labor Neg Hx     Stroke Neg Hx     Diabetes Neg Hx        Social History     Tobacco Use    Smoking status: Never Smoker    Smokeless tobacco: Never Used   Substance Use Topics    Alcohol use: Yes     Comment: occasional    Drug use: No       BP (!) 120/90   Ht 5' 5" (1.651 m)   Wt 81.7 kg (180 lb 1.9 oz)   LMP  (LMP Unknown)   BMI 29.97 kg/m²     ROS:  GENERAL: Denies weight gain or weight loss. Feeling well overall.   SKIN: Denies rash or lesions.   HEAD: Denies head injury or headache.   NODES: Denies enlarged lymph nodes.   CHEST: Denies chest pain or shortness of breath.   CARDIOVASCULAR: Denies palpitations or left sided chest pain.   ABDOMEN: No abdominal pain, constipation, diarrhea, nausea, vomiting or rectal bleeding.   URINARY: No frequency, dysuria, hematuria, or burning on urination.  REPRODUCTIVE: See HPI.   BREASTS: The patient performs breast self-examination and denies pain, lumps, or nipple discharge.   HEMATOLOGIC: No easy bruisability or excessive bleeding.  MUSCULOSKELETAL: Denies joint pain or swelling.   NEUROLOGIC: Denies syncope or weakness.   PSYCHIATRIC: Denies depression, anxiety or mood swings.    Physical Exam:    APPEARANCE: Well nourished, well developed, in no acute distress.  AFFECT: WNL, alert and oriented x 3  SKIN: No acne or hirsutism  NECK: Neck symmetric without masses or thyromegaly  NODES: No inguinal, cervical, axillary, or " femoral lymph node enlargement  CHEST: Good respiratory effect  ABDOMEN: Soft.  No tenderness or masses.  No hepatosplenomegaly.  No hernias.  PELVIC: Normal external genitalia without lesions.  Normal hair distribution.  Adequate perineal body, normal urethral meatus.  Vagina moist and well rugated without lesions or discharge.  Cervix pink, without lesions, discharge or tenderness.  No significant cystocele or rectocele.  Bimanual exam shows uterus to be normal size, regular, mobile and nontender.  Adnexa without masses or tenderness.    EXTREMITIES: No edema.    ASSESSMENT AND PLAN  1. Missed   HCG, Quantitative     Follow bhcg down to non pregnant  Consider therapy and grief counseling  Bleeding and pain precautions    Patient was counseled today on A.C.S. Pap guidelines and recommendations for yearly pelvic exams, mammograms and monthly self breast exams; to see her PCP for other health maintenance.   F/U PRN

## 2022-10-21 ENCOUNTER — HOSPITAL ENCOUNTER (EMERGENCY)
Facility: HOSPITAL | Age: 37
Discharge: HOME | End: 2022-10-21
Attending: EMERGENCY MEDICINE
Payer: MEDICAID

## 2022-10-21 VITALS
RESPIRATION RATE: 19 BRPM | HEIGHT: 65 IN | DIASTOLIC BLOOD PRESSURE: 89 MMHG | BODY MASS INDEX: 26.66 KG/M2 | WEIGHT: 160 LBS | OXYGEN SATURATION: 100 % | TEMPERATURE: 99 F | HEART RATE: 89 BPM | SYSTOLIC BLOOD PRESSURE: 138 MMHG

## 2022-10-21 DIAGNOSIS — J06.9 VIRAL URI WITH COUGH: Primary | ICD-10-CM

## 2022-10-21 PROBLEM — N92.0 EXCESSIVE AND FREQUENT MENSTRUATION: Status: ACTIVE | Noted: 2021-01-21

## 2022-10-21 PROBLEM — D50.9 IRON DEFICIENCY ANEMIA: Status: ACTIVE | Noted: 2021-01-21

## 2022-10-21 PROBLEM — E55.9 VITAMIN D DEFICIENCY: Status: ACTIVE | Noted: 2021-01-21

## 2022-10-21 PROBLEM — G43.909 MIGRAINE: Status: ACTIVE | Noted: 2021-01-06

## 2022-10-21 PROBLEM — E66.3 OVERWEIGHT WITH BODY MASS INDEX (BMI) 25.0-29.9: Status: ACTIVE | Noted: 2021-01-06

## 2022-10-21 PROBLEM — E05.00 GRAVES' DISEASE: Status: ACTIVE | Noted: 2021-01-06

## 2022-10-21 PROBLEM — R03.0 ELEVATED BLOOD-PRESSURE READING WITHOUT DIAGNOSIS OF HYPERTENSION: Status: ACTIVE | Noted: 2021-01-06

## 2022-10-21 LAB
B-HCG UR QL: NEGATIVE
CTP QC/QA: YES
CTP QC/QA: YES
INFLUENZA A ANTIGEN, POC: NEGATIVE
INFLUENZA B ANTIGEN, POC: NEGATIVE
POC RAPID STREP A: NEGATIVE
SARS-COV-2 RDRP RESP QL NAA+PROBE: NEGATIVE

## 2022-10-21 PROCEDURE — 25000003 PHARM REV CODE 250: Mod: ER | Performed by: PHYSICIAN ASSISTANT

## 2022-10-21 PROCEDURE — 63600175 PHARM REV CODE 636 W HCPCS: Mod: ER | Performed by: PHYSICIAN ASSISTANT

## 2022-10-21 PROCEDURE — 81025 URINE PREGNANCY TEST: CPT | Mod: ER | Performed by: EMERGENCY MEDICINE

## 2022-10-21 PROCEDURE — 99284 EMERGENCY DEPT VISIT MOD MDM: CPT | Mod: ER

## 2022-10-21 PROCEDURE — 87804 INFLUENZA ASSAY W/OPTIC: CPT | Mod: 59,ER

## 2022-10-21 PROCEDURE — 87635 SARS-COV-2 COVID-19 AMP PRB: CPT | Mod: ER | Performed by: PHYSICIAN ASSISTANT

## 2022-10-21 RX ORDER — PREDNISONE 20 MG/1
60 TABLET ORAL DAILY
Qty: 15 TABLET | Refills: 0 | Status: SHIPPED | OUTPATIENT
Start: 2022-10-21 | End: 2022-10-26

## 2022-10-21 RX ORDER — PROMETHAZINE HYDROCHLORIDE AND DEXTROMETHORPHAN HYDROBROMIDE 6.25; 15 MG/5ML; MG/5ML
5 SYRUP ORAL EVERY 4 HOURS PRN
Qty: 118 ML | Refills: 0 | Status: SHIPPED | OUTPATIENT
Start: 2022-10-21 | End: 2022-10-31

## 2022-10-21 RX ORDER — PREDNISONE 20 MG/1
60 TABLET ORAL
Status: COMPLETED | OUTPATIENT
Start: 2022-10-21 | End: 2022-10-21

## 2022-10-21 RX ORDER — ACETAMINOPHEN 500 MG
1000 TABLET ORAL
Status: COMPLETED | OUTPATIENT
Start: 2022-10-21 | End: 2022-10-21

## 2022-10-21 RX ORDER — PEDI MULTIVIT NO.27/FOLIC ACID 100 MCG
2 TABLET,CHEWABLE ORAL EVERY 4 HOURS PRN
Qty: 24 EACH | Refills: 0 | Status: SHIPPED | OUTPATIENT
Start: 2022-10-21 | End: 2022-10-28

## 2022-10-21 RX ORDER — OXYMETAZOLINE HCL 0.05 %
1 SPRAY, NON-AEROSOL (ML) NASAL 2 TIMES DAILY
Qty: 15 ML | Refills: 0 | Status: SHIPPED | OUTPATIENT
Start: 2022-10-21 | End: 2022-10-24

## 2022-10-21 RX ADMIN — PREDNISONE 60 MG: 20 TABLET ORAL at 08:10

## 2022-10-21 RX ADMIN — ACETAMINOPHEN 1000 MG: 500 TABLET ORAL at 08:10

## 2022-10-21 NOTE — Clinical Note
"Doreen Sherman"Michael was seen and treated in our emergency department on 10/21/2022.  She may return to work on 10/24/2022.  If afebrile for 24 hours     If you have any questions or concerns, please don't hesitate to call.      Ronny Cintron PA-C"

## 2022-10-22 NOTE — DISCHARGE INSTRUCTIONS

## 2022-10-22 NOTE — ED NOTES
PT GIVEN DISCHARGE INSTRUCTIONS INCLUDING FEVER CONTROL AND LOOKING UP SWAB RESULTS FOR TODAY'S VISIT. VERBALIZES UNDERSTANDING.

## 2023-03-24 ENCOUNTER — CLINICAL SUPPORT (OUTPATIENT)
Dept: OBSTETRICS AND GYNECOLOGY | Facility: CLINIC | Age: 38
End: 2023-03-24
Payer: MEDICAID

## 2023-03-24 DIAGNOSIS — N91.2 AMENORRHEA: Primary | ICD-10-CM

## 2023-03-24 PROBLEM — M25.512 CHRONIC PAIN OF BOTH SHOULDERS: Status: ACTIVE | Noted: 2023-03-24

## 2023-03-24 PROBLEM — G89.29 CHRONIC PAIN OF BOTH SHOULDERS: Status: ACTIVE | Noted: 2023-03-24

## 2023-03-24 PROBLEM — M25.511 CHRONIC PAIN OF BOTH SHOULDERS: Status: ACTIVE | Noted: 2023-03-24

## 2023-03-30 ENCOUNTER — TELEPHONE (OUTPATIENT)
Dept: OBSTETRICS AND GYNECOLOGY | Facility: CLINIC | Age: 38
End: 2023-03-30
Payer: MEDICAID

## 2023-03-30 NOTE — TELEPHONE ENCOUNTER
Patient was contacted and advised she has to contact Dr. Maurer office to schedule an initial OB appointment with her.

## 2023-03-31 ENCOUNTER — OFFICE VISIT (OUTPATIENT)
Dept: OBSTETRICS AND GYNECOLOGY | Facility: CLINIC | Age: 38
End: 2023-03-31
Payer: MEDICAID

## 2023-03-31 ENCOUNTER — HOSPITAL ENCOUNTER (OUTPATIENT)
Dept: PERINATAL CARE | Facility: OTHER | Age: 38
Discharge: HOME | End: 2023-03-31
Attending: OBSTETRICS & GYNECOLOGY
Payer: MEDICAID

## 2023-03-31 VITALS
WEIGHT: 177.94 LBS | DIASTOLIC BLOOD PRESSURE: 94 MMHG | SYSTOLIC BLOOD PRESSURE: 132 MMHG | BODY MASS INDEX: 29.61 KG/M2

## 2023-03-31 DIAGNOSIS — Z12.4 PAP SMEAR FOR CERVICAL CANCER SCREENING: ICD-10-CM

## 2023-03-31 DIAGNOSIS — Z32.01 POSITIVE PREGNANCY TEST: Primary | ICD-10-CM

## 2023-03-31 DIAGNOSIS — N91.2 AMENORRHEA: ICD-10-CM

## 2023-03-31 DIAGNOSIS — N91.4 SECONDARY OLIGOMENORRHEA: ICD-10-CM

## 2023-03-31 PROCEDURE — 1159F MED LIST DOCD IN RCRD: CPT | Mod: CPTII,,, | Performed by: FAMILY MEDICINE

## 2023-03-31 PROCEDURE — 99999 PR PBB SHADOW E&M-EST. PATIENT-LVL III: ICD-10-PCS | Mod: PBBFAC,,, | Performed by: FAMILY MEDICINE

## 2023-03-31 PROCEDURE — 87086 URINE CULTURE/COLONY COUNT: CPT | Performed by: FAMILY MEDICINE

## 2023-03-31 PROCEDURE — 82570 ASSAY OF URINE CREATININE: CPT | Performed by: FAMILY MEDICINE

## 2023-03-31 PROCEDURE — 99213 OFFICE O/P EST LOW 20 MIN: CPT | Mod: S$PBB,TH,, | Performed by: FAMILY MEDICINE

## 2023-03-31 PROCEDURE — 1159F PR MEDICATION LIST DOCUMENTED IN MEDICAL RECORD: ICD-10-PCS | Mod: CPTII,,, | Performed by: FAMILY MEDICINE

## 2023-03-31 PROCEDURE — 76801 OB US < 14 WKS SINGLE FETUS: CPT

## 2023-03-31 PROCEDURE — 3044F HG A1C LEVEL LT 7.0%: CPT | Mod: CPTII,,, | Performed by: FAMILY MEDICINE

## 2023-03-31 PROCEDURE — 87624 HPV HI-RISK TYP POOLED RSLT: CPT | Performed by: FAMILY MEDICINE

## 2023-03-31 PROCEDURE — 99213 PR OFFICE/OUTPT VISIT, EST, LEVL III, 20-29 MIN: ICD-10-PCS | Mod: S$PBB,TH,, | Performed by: FAMILY MEDICINE

## 2023-03-31 PROCEDURE — 76801 OB US < 14 WKS SINGLE FETUS: CPT | Mod: 26,,, | Performed by: OBSTETRICS & GYNECOLOGY

## 2023-03-31 PROCEDURE — 3008F BODY MASS INDEX DOCD: CPT | Mod: CPTII,,, | Performed by: FAMILY MEDICINE

## 2023-03-31 PROCEDURE — 87591 N.GONORRHOEAE DNA AMP PROB: CPT | Performed by: FAMILY MEDICINE

## 2023-03-31 PROCEDURE — 3044F PR MOST RECENT HEMOGLOBIN A1C LEVEL <7.0%: ICD-10-PCS | Mod: CPTII,,, | Performed by: FAMILY MEDICINE

## 2023-03-31 PROCEDURE — 1160F RVW MEDS BY RX/DR IN RCRD: CPT | Mod: CPTII,,, | Performed by: FAMILY MEDICINE

## 2023-03-31 PROCEDURE — 3008F PR BODY MASS INDEX (BMI) DOCUMENTED: ICD-10-PCS | Mod: CPTII,,, | Performed by: FAMILY MEDICINE

## 2023-03-31 PROCEDURE — 3080F DIAST BP >= 90 MM HG: CPT | Mod: CPTII,,, | Performed by: FAMILY MEDICINE

## 2023-03-31 PROCEDURE — 99213 OFFICE O/P EST LOW 20 MIN: CPT | Mod: PBBFAC,25,TH | Performed by: FAMILY MEDICINE

## 2023-03-31 PROCEDURE — 88175 CYTOPATH C/V AUTO FLUID REDO: CPT | Performed by: FAMILY MEDICINE

## 2023-03-31 PROCEDURE — 3075F SYST BP GE 130 - 139MM HG: CPT | Mod: CPTII,,, | Performed by: FAMILY MEDICINE

## 2023-03-31 PROCEDURE — 1160F PR REVIEW ALL MEDS BY PRESCRIBER/CLIN PHARMACIST DOCUMENTED: ICD-10-PCS | Mod: CPTII,,, | Performed by: FAMILY MEDICINE

## 2023-03-31 PROCEDURE — 3075F PR MOST RECENT SYSTOLIC BLOOD PRESS GE 130-139MM HG: ICD-10-PCS | Mod: CPTII,,, | Performed by: FAMILY MEDICINE

## 2023-03-31 PROCEDURE — 76801 PR US, OB <14WKS, TRANSABD, SINGLE GESTATION: ICD-10-PCS | Mod: 26,,, | Performed by: OBSTETRICS & GYNECOLOGY

## 2023-03-31 PROCEDURE — 99999 PR PBB SHADOW E&M-EST. PATIENT-LVL III: CPT | Mod: PBBFAC,,, | Performed by: FAMILY MEDICINE

## 2023-03-31 PROCEDURE — 3080F PR MOST RECENT DIASTOLIC BLOOD PRESSURE >= 90 MM HG: ICD-10-PCS | Mod: CPTII,,, | Performed by: FAMILY MEDICINE

## 2023-03-31 NOTE — PATIENT INSTRUCTIONS
LABOR AND DELIVERY PHONE NUMBER, [phone] (OPEN , LOCATED ON 6TH FLOOR OF HOSPITAL)  [address] PHONE NUMBER, [phone] (OPEN MON-FRI, 8a-5p)     1) Eat small frequent meals through the day versus three large meals  2) Try ginger ale or sprite to help settle the stomach   3) Eat crackers or dry toast before getting out of bed in the morning   4) Stay hydrated by drinking plenty of water-do not immediately eat or drink something after vomiting. Give your stomach a rest for 20-30 minutes. Slowly reintroduce ice chips, small sips water, crackers, etc.    5) Try OTC unisom (1/2 tablet) and vitamin B6 - take the 25mg b6 twice a day and then both together at night before bed. This can help with the nausea in the morning and is safe to use during pregnancy.    If you are unable to keep anything down and constantly vomiting for more that 24 hours, let the office know so that dehydration can be avoided. We would recommend being seen in the emergency department if this is the case.       Topic  General Pregnancy Information Recommended   (Unless Otherwise Contraindicated Or Restrictions Given To You By Your OB Doctor)      1. Anticipated course of prenatal care      Visits: will be Every 4 wks until 28 weeks, then every 2 weeks until 36 weeks, and then weekly until delivery.   Urine will be collected at each Obstetric visit        2. Nutrition and weight gain    Daily pre-pauline vitamin (recommend taking at night)   Additional 300 calories needed daily  No Sushi, hotdogs, unpasteurized products (milk/cheeses). No large fish such as: shark, harper mackerel, tile, sword fish   Incorporate 12 ounces of smaller seafood/week and no more than 6oz of albacore tuna   Caffeine: 200 mg/day or 2 cups of caffeine/day   Weight gain recommendations are based off of BMI before pregnancy. Generally patients who with normal weight prior pregnancy it is recommended 25-35 pounds of weight gain during the pregnancy with an estimated  weekly gain of 1 pound/wk in 2nd and 3rd Trimester.    3. Toxoplasmosis precautions  If cats are in the home avoid changing litter boxes and if you need to change the litter box recommended you use gloves   4. Sexual Activity  Sexual activity is okay unless you are put on restrictions by your provider. I recommend urinating after intercourse    5. Exercise  Generally pre-pregnancy routine is okay to continue   Drink plenty fluids for hydration   Stop any activity that causes heavy cramping like a period or bright red bleeding and contact your provider  No extreme or contact sports   No exercise on your back for an extended period of time after 20 weeks    6. Hot Tub/Saunas  Avoid hot tubes and saunas    7. Hair Treatments  Because of the lack of scientific studies on the effects of chemical treatments on your hair, we must advise that you do it at your own risk. If you choose to treat your hair, we recommend that you wait until after 12 weeks gestation. At this time there is no reason to believe that normal hair treatment is associated with onsequences to the baby.    8. Vaccines  Influenza vaccine is recommended by CDC during flu season   Tdap (pertussis or whopping cough) recommended each pregnancy between 27 and 36 weeks   Tdap booster recommended for family and other planned direct caregivers    9. Water  Water is an important nutrient in a good diet. However, it cannot be stressed enough that during pregnancy water is essential. The body has increased circulation through blood vessels, and without a large increase in water, pregnant women will be dehydrated. It plays an important role in decreasing constipation, preventing  contractions, decreasing swelling, and preventing dizziness. We recommend that you drink 8-10 glasses of water per day.    10. Smoking/Alcohol/Illicit Drug Use  No safe Level   Can lead to problems with pregnancy   Growth of the developing fetus    labor (delivery before 37  weeks)    rupture of the membranes (water breaking before 37 weeks)   Premature separation of the placenta (which may cause bleeding)   American College of Obstetricians and Gynecologists endorses abstinence   Can lead to babies with disabilities    11. Environmental or work hazards  Unless otherwise restricted you may continue work throughout the pregnancy   Notify your provider of any work hazards or chemical exposure concerns   12. Travel    Safe to travel up to 35 weeks   Continue to wear a seatbelt and airbags are still recommended   Drink plenty fluids   Blood clots are a concern during pregnancy with long travel. Recommend compression stockings and moving around at least every 2 hours and staying hydrated.    13. Use of medications, vitamins, herbs, OTC drugs    Any medications not on the list provided to you from our clinic or given to you by one of our providers we recommend calling to make sure the medication is safe for you and baby.    14. Domestic Violence    Please notify office immediately of any concerns or violence so that we can help direct you to assistance needed   [place] [employer]: [phone]    15. Childbirth classes    List of Childbirth classes from [facility] is available    16. Selecting a Pediatrician  Selecting a pediatrician before delivery is recommended  You can interview pediatricians before delivery    17. Fetal Monitoring    A simple test of your babys well-being is a kick count. After 26 weeks, fetal motion of any kind should be monitored. Further discussion at that time   18.  Labor Signs    Water break, leaking fluids from Vagina prior 37 weeks  Regular contractions, Contractions that are more than 5-6/hour, getting stronger and painful with lower back pain, does not go away with rest and fluids    19. Postpartum Family Planning    Multiple options available from short term methods to long term reversible and irreversible methods    Discuss with provider as you get closer to delivery    20. Dental    It is recommended that you get an annual dental cleaning    21. Breastfeeding    Classes offered at [facility] and it is recommended to take a class    22. Lifting In 2013, the [place] [employer] Safety and Health (NIOSH) published clinical guidelines for occupational lifting in uncomplicated pregnancies. The recommended weight limits are based on gestational age, intermittent versus repetitive lifting, time (hours/day) spent lifting, and lifting height from floor and distance in 3 front of body. In this guideline, the maximum permissible weight for a woman less than 20 weeks of gestation performing infrequent lifting is 36 pounds (16 kgs) and the maximum permissible weight at ?20 weeks is 26 pounds (12 kgs). For repetitive lifting ?1 hour/day, the maximum weights in the first and second half of pregnancy are 18 pounds (8 kgs) and 13 pounds (6 kgs), respectively, and for repetitive lifting <1 hour/day, the maximum weights are 30 pounds (14 kgs) and 22 pounds (10 kgs), respectively. Although not based on high quality evidence, these guidelines are a reasonable reference for counseling pregnant women     23. Scheduling and Provider Availability    Your Obstetric Doctor is usually here weekly but not every day. We recommend you make 3-4 advanced appointments at a time to accommodate your personal needs and work/school obligations.   We ask that you come 15 minutes prior your scheduled appointment.   For same day appointments (not routine appointments) there is a Nurse Practitioner or another obstetric provider available. Please let the  aware you are an OB patient requesting a same day appointment.      24. Recommended Phone [facility]      MEDICATIONS IN PREGNANCY     While some medications are considered safe to take during pregnancy, the effects of other medications on your unborn baby are unknown.  Therefore, it is very important to pay special attention to medications you take while you are pregnant.   If you were taking prescription medications before you became pregnant, please ask your health care provider about continuing these medications as soon as you find out that you are pregnant. Do not stop taking any medications without discussing with your health care provider. Your health care provider will weigh the benefits and risks to your pregnancy when making his or her recommendation. With some medications, the risk of not taking them may be more serious than the potential risk of taking them.   If you are prescribed any new medication, please inform your health care provider that you are pregnant. Be sure to discuss the risks and benefits of the newly prescribed medication with your health care provider before taking the medication. We are always available to answer any questions about new medications and how they relate to your pregnancy.     Are Alternative Pregnancy Medicine Therapies Safe?   Many pregnant women believe natural products can be safely used to relieve nausea, backache, and other annoying symptoms of pregnancy, but many of these so-called natural products have not been tested for their safety and effectiveness. Therefore, it is very important to check with your health care provider before taking any alternative therapies. She will not recommend a product or therapy until it is shown to be safe and effective.     Which Over the Counter Drugs Are Safe?   Prenatal vitamins, now available without a prescription, are safe and important to take during pregnancy. Ask your health care provider about the safety of taking other vitamins, herbal remedies and supplements during pregnancy. Most herbal preparations and supplements have not been proven to be safe during pregnancy. Generally, you should not take any over-the-counter medication unless it is necessary. The following medications and home  remedies have no known harmful effects during pregnancy when taken according to the package directions. If you want to know about the safety of any other medications not listed here, please contact your health care provider.      Problem:  Safe to Take:    Pain relief, headache, and fever  Acetaminophen - Tylenol, Anacin Aspirin-Free    Heartburn  Acid neutralizers - Maalox, Mylanta, Rolaids, Tums, Gaviscon   Histamine-blockers - Pepcid, Zantac, Prilosec    Gas pains and bloating  Simethicone - Gas-X, Maalox Anti-Gas, Mylanta Gas, Mylicon    Nausea  Viri - beverages, tablets, candies   Vitamin B6   Emetrol (if not diabetic)   Sea bands   Anti-histamines - Sleep-daniela, Benadryl, Bonnine, Dramamine    Cough  Guaifenesin (expectorant) - Hytuss, Mucinex, Robitussin   Dextromethorphan (antitussive) - Benylin, Delsym, Scot-Tussin DM   Guaifenesin plus dextromethorphan - Benylin Expectorant, Robitussin DM    Congestion  Pseudoephedrine - Sudafed, Actifed, Dristan, Neosynephrine   Vicks VapoRub   Saline nasal drops or spray    Sore throat  Throat lozenges - Sucrets, Cepacol, Cepastat, Ricola   Chloroseptic Spray   Warm salt/water gargle    Allergy relief  Chlorpheniramine - Chlor-Trimeton, Triaminic   Loratadine - Alavert, Claritin, Tavist ND, Triaminic Allerchews   Cetirizine - Zyrtec   Diphenhydramine -Benadryl, Diphenhist    Rashes  Hydrocortisone cream or ointment   Caladryl lotion or cream   Benadryl cream   Oatmeal bath (Aveeno)    Diarrhea  Loperamide - Imodium, Kaopectate, Maalox Anti-Diarrheal, Pepto Bismol    Constipation  Fiber supplements - Metamucil, Citrucel, Fiberall/Fibercon, Benefiber   Stool softeners - Colace, Senekot, Dulcolax   Milk of Magnesia    Hemorrhoids  Warm baths   Witch hazel preparations - Tucks medicated pads   Steroid preparations - Anusol-HC, Preparation H    Insomnia  Diphenhydramine - Benadryl, Unisom SleepGels, Nytol, Sominex   Doxylamine succinate - Unisom Nighttime Sleep-Aid     First-aid ointments  Cortaid, Lanacort, Polysporin, Bacitracin, Neosporin    Yeast infections  Call office for appointment      Connected MOM   Using Wireless Technology to Manage Your Prenatal Health   Congratulations! Your team here at [facility] is excited for the upcoming addition to your family and is ready to support you over the course of your pregnancy. One of the ways we are prepared to help you is through our exciting new Digital Medicine Program, Connected MOM. Connected MOM stands for Connected Maternity Online Monitoring and is offered free of charge as a way to help our expectant mothers manage their pregnancy with fewer visits to the obstetrician.    In the fast-paced environment we live in, patients demand convenient, reliable access to healthcare at their fingertips. Recommended by The American College of Obstetricians and Gynecologists (ACOG), the standard prenatal care model for low-risk pregnancies can average anywhere between 12-14 in-office prenatal visits.    Through this innovative program, participating mothers-to-be receive a wireless scale, wireless blood pressure cuff and an at-home urine protein test kit. Through the use of a smartphone, patients can easily send their weight, blood pressure readings and urine protein test results digitally in real-time from the comfort of their own homes. Results are sent directly to their [facility] account, the systems online patient portal which connects directly to the patients Electronic Medical Record. A specialized Connected MOM care team - comprised of the patients obstetrician, a dedicated health  and a  - reviews the data and provides medical recommendations as needed. This allows expectant mothers to receive care proactively, wherever they are, while minimizing the need for in-person visits and thus minimizing disruption to their regular daily activities.    How it Works At the initial prenatal  visit, interested patients can work with their obstetrician to sign up for the program. After the appointment, expectant mothers can head to the [facility], a first-of-its-kind retail experience created by [facility] offering the latest in cutting-edge, interactive healthcare technology, to receive a Connected MOM kit containing all of the digital tools needed for remote monitoring. Once enrolled, patients weigh themselves regularly and take their blood pressure once a week. Instead of coming to three office appointments at weeks 20, 30, 37 the patient will have the appointment at home. They will take their blood pressure, weight and perform three at-home urine protein tests at these home visits. Results are directly transmitted into the EMR, and notifications and messages from the care team are communicated via [facility].     TECH SUPPORT [phone]  [email or web address]      Chromosomal testing  The sequential screen is a test done around 11-13 weeks that consists of an ultrasound that measures the nuchal fold (neck thickness) of baby and blood work on the same day. You get a second set of labs done a few weeks later after 15 weeks. Based on all three of these results, they are able to tell you if you are considered to be low risk or high risk regarding neural tube defects and chromosomal abnormalities like Down Syndrome. If you are at all interested, I usually place the order today so we do not miss the window. Someone will give you a phone call in a week or two to schedule this. If you do not want it, just tell them no thank you. This test is typically covered by your insurance and is performed by the Maternal Fetal Medicine (MFM) department here at [facility]. It will not tell you the sex of the baby.     OR     2.  Call [phone] to see about coverage and any out of pocket costs regarding the [id number] (MT21) testing. This is done any day after 11 weeks and is blood work only. It checks for any  chromosomal abnormalities like Down Syndrome. You can also find out the sex of the baby if you choose to know. Once you find out coverage and decide to proceed, send either myself or your doctor a message and we can see what date you can do it. It is done at our second floor lab at [facility].

## 2023-03-31 NOTE — PROGRESS NOTES
HISTORY OF PRESENT ILLNESS:    Amber Toussaint is a 37 y.o. female, ,  Patient's last menstrual period was 2023 (exact date).  for a routine exam complaining of amenorrhea and positive home UPT. Hx of ectopic - early  IUP on US today. No pelvic pain or VB. Mild nausea, no vomiting. C section  -child has WFW and had heart surgery at 10 wk. Partner healthy. Pt had TSH done on  1.51 - did not want to repeat today, endocrinologist manages. This is the extent of the patient's complaints at this time.     OB History    Para Term  AB Living   3 1 1 0 1 1   SAB IAB Ectopic Multiple Live Births   0 0 1 0 1      # Outcome Date GA Lbr Misbah/2nd Weight Sex Delivery Anes PTL Lv   3             2 Ectopic 21     ECTOPIC      1 Term 04/17/15 38w0d  2.92 kg (6 lb 7 oz) F CS-LTranv EPI N STEVEN      Complications: VSD (ventricular septal defect)       Past Medical History:   Diagnosis Date    Graves disease     Iron deficiency anemia 2021       Past Surgical History:   Procedure Laterality Date    BREAST SURGERY      Cysts removed     SECTION      DIAGNOSTIC LAPAROSCOPY N/A 2021    Procedure: LAPAROSCOPY, DIAGNOSTIC, possible salpingostomy/ salpingectomy/  D& C;  Surgeon: Cheri Hunter MD;  Location: [facility];  Service: OB/GYN;  Laterality: N/A;    DILATION AND CURETTAGE OF UTERUS  2021    Procedure: DILATION AND CURETTAGE, UTERUS;  Surgeon: Cheri Hunter MD;  Location: [facility] OR;  Service: OB/GYN;;    fibroid removal  ,     breast    HEMANGIOMA EXCISION      right thigh    LAPAROSCOPIC SALPINGECTOMY Right 2021    Procedure: SALPINGECTOMY, LAPAROSCOPIC;  Surgeon: Cheri Hunter MD;  Location: [facility] OR;  Service: OB/GYN;  Laterality: Right;       MEDICATIONS AND ALLERGIES:      Current Outpatient Medications:     ergocalciferol (ERGOCALCIFEROL) 50,000 unit Cap, Take 50,000 Units by mouth once a week., Disp: , Rfl:     prenatal 105-iron-folic  ac-dha 30 mg iron- 1.4 mg-300 mg Cmpk, Take by mouth., Disp: , Rfl:     Review of patient's allergies indicates:   Allergen Reactions    Vinyl ether Hives       Family History   Problem Relation Age of Onset    Pacemaker/defibrilator Mother     Hypertension Mother     Kidney failure Mother     Hypertension Father     Throat cancer Father     Breast cancer Maternal Grandmother     Hypertension Maternal Grandmother     Colon cancer Paternal Grandfather      labor Neg Hx     Stroke Neg Hx     Diabetes Neg Hx        Social History     Socioeconomic History    Marital status:    Tobacco Use    Smoking status: Never    Smokeless tobacco: Never   Substance and Sexual Activity    Alcohol use: Not Currently     Comment: occasional    Drug use: No    Sexual activity: Yes     Partners: Male       COMPREHENSIVE GYN HISTORY:  PAP History: Denies abnormal Paps.  Infection History: Denies STDs. Denies PID.  Benign History: Denies uterine fibroids. Denies ovarian cysts. Denies endometriosis. Denies other conditions.  Cancer History: Denies cervical cancer. Denies uterine cancer or hyperplasia. Denies ovarian cancer. Denies vulvar cancer or pre-cancer. Denies vaginal cancer or pre-cancer. Denies breast cancer. Denies colon cancer.  Sexual Activity History: Reports currently being sexually active  Menstrual History: None.  Contraception: None    ROS:  GENERAL: No weight changes. No swelling. No fatigue. No fever.  CARDIOVASCULAR: No chest pain. No shortness of breath. No leg cramps.   NEUROLOGICAL: No headaches. No vision changes.  BREASTS: + pain. No lumps. No discharge.  ABDOMEN: No pain. + nausea. No vomiting. No diarrhea. No constipation.  REPRODUCTIVE: No abnormal bleeding.   VULVA: No pain. No lesions. No itching.  VAGINA: No relaxation. No itching. No odor. No discharge. No lesions.  URINARY: No incontinence. No nocturia. No frequency. No dysuria.    BP (!) 132/94   Wt 80.7 kg (177 lb 14.6 oz)   LMP 2023  (Exact Date)   BMI 29.61 kg/m²     PE:  Physical Exam:   Constitutional: She appears well-developed and well-nourished. She does not appear ill. No distress.        Pulmonary/Chest: Right breast exhibits no inverted nipple, no mass, no nipple discharge, no skin change, no tenderness and no swelling. Left breast exhibits no inverted nipple, no mass, no nipple discharge, no skin change, no tenderness and no swelling.          Genitourinary:    Vagina, uterus, right adnexa and left adnexa normal.      Pelvic exam was performed with patient supine.   The external female genitalia was normal.   Genitalia hair distrobution normal .   There is no rash or lesion on the right labia. There is no rash or lesion on the left labia. Cervix is normal. No rectocele, cystocele or unspecified prolapse of vaginal walls in the vagina.    pap smear completedUterus is not tender. Normal urethral meatus.Urethra findings: no urethral mass              Neurological: GCS eye subscore is 4. GCS verbal subscore is 5. GCS motor subscore is 6.       PROCEDURES:  UPT Positive  Genprobe  Pap      DIAGNOSIS:  Gyn exam  IUP with stated LMP of 2/16/23      Indication   ========   Indication: Estimation of Gestational Age   Indication: Hx of Ectopic Pregnancy     History   ======   Risk Factors   History risk factors: Hx Ectopic pregnancy   History risk factors: AMA     Method   ======   Transabdominal and transvaginal ultrasound examination, [place] S8. View: Good view     Pregnancy   =========   Watters pregnancy. Number of fetuses: 1     Assessment   ==========   Gestational sac: visualized   Location: intrauterine   Yolk sac: visualized   Amniotic sac: visualized   Embryo: visualized   CRL 1.3 mm -/- Hadlock   Cardiac activity: uncertain     Maternal Structures   ===============   Uterus / Cervix   Uterus: Normal   Cervix: Visualized   Approach: Transvaginal   Ovaries / Tubes / Adnexa   Rt ovary: Normal   Rt ovary D1 23 mm   Rt ovary D2 21 mm    Rt ovary D3 15 mm   Rt ovary Vol 3.7 cmÂ³   Lt ovary: Normal   Lt ovary D1 37 mm   Lt ovary D2 20 mm   Lt ovary D3 24 mm   Lt ovary Vol 9.1 cmÂ³   Lt ovarian corpus luteum: visualized   Lt ovarian corpus luteum D1 19.0 mm   Lt ovarian corpus luteum D2 17.0 mm   Lt ovarian corpus luteum D3 16.0 mm   Cul de Sac / Bladder / Kidneys / Other   Free fluid: No free fluid visualized     Impression   =========   read only ultrasound:   an early intrauterine pregnancy visualized:   -yolk sac visualized   -embryo with CRL 1.3 mm   -no fetal cardiac activity appreciated   The maternal adnexae are normal in appearance. No free fluid visualized in the pelvis     Recommendation   ==============   recommend a follow up ultrasound in at least 7-10 days for f/u of fetal cardiac activity and CRL measurement:   ordering/facilitation per Primary OB     Limitations   =========   Please note: Prenatal ultrasound studies have limitations. They do not detect all fetal, genetic, placental, and maternal abnormalities. A normal appearing prenatal   ultrasound is reassuring. However, it does not guarantee the absence of an abnormality or predict a normal outcome for the fetus or the mother.                                                              PLAN:Routine prenatal care    MEDICATIONS PRESCRIBED:  PNV    LABS AND TESTS ORDERED:  New Ob Labs      1st TRIMESTER COUNSELING: Discussed all, booklet provided  Common complaints of pregnancy  HIV and other routine prenatal tests including  genetic screening  Risk factors identified by prenatal history  Anticipated course of prenatal care  Nutrition and weight gain counseling  Toxoplasmosis precautions (Cats/Raw Meat)  Sexual activity and exercise  Environmental/Work hazards  Travel  Tobacco (Ask, Advise, Assess, Assist, and Arrange), as well as alcohol and drug use  Use of any medications (Including supplements, Vitamins, Herbs, or OTC Drugs)  Indications for Ultrasound  Domestic  violence  Seat belt use  Childbirth classes/Hospital facilities     TERATOLOGY COUNSELING: Discussed options for SS, MT21 and carrier screening. Pt will let us know her desires. Discussed time constraints on SS      FOLLOW-UP for a New Ob Visit in  4    weeks with  (will try to move up iob with repeat US)  -discussed to call clinic or L&D/ER if after hours for pain/bleeding  -bp today 132/94, elevated on previous visits, will get baseline preE labs  -pt will do iob labs with next visit

## 2023-04-01 LAB
CREAT UR-MCNC: 75 MG/DL (ref 15–325)
PROT UR-MCNC: <7 MG/DL (ref 0–15)
PROT/CREAT UR: NORMAL MG/G{CREAT} (ref 0–0.2)

## 2023-04-02 LAB — BACTERIA UR CULT: NO GROWTH

## 2023-04-03 LAB
C TRACH DNA SPEC QL NAA+PROBE: NOT DETECTED
N GONORRHOEA DNA SPEC QL NAA+PROBE: NOT DETECTED

## 2023-04-07 ENCOUNTER — PATIENT MESSAGE (OUTPATIENT)
Dept: OBSTETRICS AND GYNECOLOGY | Facility: CLINIC | Age: 38
End: 2023-04-07
Payer: MEDICAID

## 2023-04-07 LAB
HPV HR 12 DNA SPEC QL NAA+PROBE: POSITIVE
HPV16 AG SPEC QL: NEGATIVE
HPV18 DNA SPEC QL NAA+PROBE: NEGATIVE

## 2023-04-10 NOTE — TELEPHONE ENCOUNTER
Spoke with pt via phone. +HPV other, pap still in process. Discussed HPV, will let her know further f/u once pap results. She has not had gardisil. Can consider after pregnancy

## 2023-04-11 LAB
FINAL PATHOLOGIC DIAGNOSIS: NORMAL
Lab: NORMAL

## 2023-04-17 ENCOUNTER — HOSPITAL ENCOUNTER (OUTPATIENT)
Dept: PERINATAL CARE | Facility: OTHER | Age: 38
Discharge: HOME | End: 2023-04-17
Attending: FAMILY MEDICINE
Payer: MEDICAID

## 2023-04-17 DIAGNOSIS — Z32.01 POSITIVE PREGNANCY TEST: ICD-10-CM

## 2023-04-17 PROCEDURE — 76801 OB US < 14 WKS SINGLE FETUS: CPT

## 2023-04-17 PROCEDURE — 76801 US OB/GYN PROCEDURE (VIEWPOINT): ICD-10-PCS | Mod: 26,,, | Performed by: OBSTETRICS & GYNECOLOGY

## 2023-04-17 PROCEDURE — 76801 OB US < 14 WKS SINGLE FETUS: CPT | Mod: 26,,, | Performed by: OBSTETRICS & GYNECOLOGY

## 2023-05-01 ENCOUNTER — PATIENT MESSAGE (OUTPATIENT)
Dept: OBSTETRICS AND GYNECOLOGY | Facility: CLINIC | Age: 38
End: 2023-05-01
Payer: MEDICAID

## 2023-05-01 ENCOUNTER — TELEPHONE (OUTPATIENT)
Dept: OBSTETRICS AND GYNECOLOGY | Facility: CLINIC | Age: 38
End: 2023-05-01
Payer: MEDICAID

## 2023-05-01 NOTE — TELEPHONE ENCOUNTER
Called pt to remind her of labs that she needs drawn. Pt verbalized understanding and had no further questions at this time.

## 2023-05-05 ENCOUNTER — INITIAL PRENATAL (OUTPATIENT)
Dept: OBSTETRICS AND GYNECOLOGY | Facility: CLINIC | Age: 38
End: 2023-05-05
Payer: MEDICAID

## 2023-05-05 VITALS — BODY MASS INDEX: 28.87 KG/M2 | SYSTOLIC BLOOD PRESSURE: 124 MMHG | DIASTOLIC BLOOD PRESSURE: 82 MMHG | WEIGHT: 173.5 LBS

## 2023-05-05 DIAGNOSIS — Z3A.10 10 WEEKS GESTATION OF PREGNANCY: Primary | ICD-10-CM

## 2023-05-05 DIAGNOSIS — O09.521 MULTIGRAVIDA OF ADVANCED MATERNAL AGE IN FIRST TRIMESTER: ICD-10-CM

## 2023-05-05 PROCEDURE — 99999 PR PBB SHADOW E&M-EST. PATIENT-LVL III: CPT | Mod: PBBFAC,,, | Performed by: OBSTETRICS & GYNECOLOGY

## 2023-05-05 PROCEDURE — 99213 PR OFFICE/OUTPT VISIT, EST, LEVL III, 20-29 MIN: ICD-10-PCS | Mod: TH,S$PBB,, | Performed by: OBSTETRICS & GYNECOLOGY

## 2023-05-05 PROCEDURE — 99213 OFFICE O/P EST LOW 20 MIN: CPT | Mod: PBBFAC,TH,PN | Performed by: OBSTETRICS & GYNECOLOGY

## 2023-05-05 PROCEDURE — 99213 OFFICE O/P EST LOW 20 MIN: CPT | Mod: TH,S$PBB,, | Performed by: OBSTETRICS & GYNECOLOGY

## 2023-05-05 PROCEDURE — 99999 PR PBB SHADOW E&M-EST. PATIENT-LVL III: ICD-10-PCS | Mod: PBBFAC,,, | Performed by: OBSTETRICS & GYNECOLOGY

## 2023-05-05 NOTE — PROGRESS NOTES
HERE for routine OB visit at 10 4/7 wks, with NO complaints.  Denies vaginal bleeding, cramping/ ctx, or LOF.  + FM.  F/U in four weeks.

## 2023-05-08 ENCOUNTER — LAB VISIT (OUTPATIENT)
Dept: LAB | Facility: HOSPITAL | Age: 38
End: 2023-05-08
Attending: OBSTETRICS & GYNECOLOGY
Payer: MEDICAID

## 2023-05-08 DIAGNOSIS — Z3A.10 10 WEEKS GESTATION OF PREGNANCY: ICD-10-CM

## 2023-05-08 LAB
ABO + RH BLD: NORMAL
BLD GP AB SCN CELLS X3 SERPL QL: NORMAL
T4 FREE SERPL-MCNC: 1.03 NG/DL (ref 0.71–1.51)
TSH SERPL DL<=0.005 MIU/L-ACNC: 0.4 UIU/ML (ref 0.4–4)

## 2023-05-08 PROCEDURE — 36415 COLL VENOUS BLD VENIPUNCTURE: CPT | Mod: PN | Performed by: OBSTETRICS & GYNECOLOGY

## 2023-05-08 PROCEDURE — 84439 ASSAY OF FREE THYROXINE: CPT | Performed by: OBSTETRICS & GYNECOLOGY

## 2023-05-08 PROCEDURE — 86900 BLOOD TYPING SEROLOGIC ABO: CPT | Performed by: OBSTETRICS & GYNECOLOGY

## 2023-05-08 PROCEDURE — 84443 ASSAY THYROID STIM HORMONE: CPT | Performed by: OBSTETRICS & GYNECOLOGY

## 2023-05-11 ENCOUNTER — TELEPHONE (OUTPATIENT)
Dept: FAMILY MEDICINE | Facility: CLINIC | Age: 38
End: 2023-05-11
Payer: MEDICAID

## 2023-05-11 VITALS — DIASTOLIC BLOOD PRESSURE: 82 MMHG | SYSTOLIC BLOOD PRESSURE: 124 MMHG

## 2023-05-23 ENCOUNTER — TELEPHONE (OUTPATIENT)
Dept: OBSTETRICS AND GYNECOLOGY | Facility: CLINIC | Age: 38
End: 2023-05-23
Payer: MEDICAID

## 2023-05-23 ENCOUNTER — PATIENT MESSAGE (OUTPATIENT)
Dept: OBSTETRICS AND GYNECOLOGY | Facility: CLINIC | Age: 38
End: 2023-05-23
Payer: MEDICAID

## 2023-05-23 NOTE — TELEPHONE ENCOUNTER
Called patient. No answer. Mailbox full.      Island Pedicle Flap-Requiring Vessel Identification Text: The defect edges were debeveled with a #15 scalpel blade.  Given the location of the defect, shape of the defect and the proximity to free margins an island pedicle advancement flap was deemed most appropriate.  Using a sterile surgical marker, an appropriate advancement flap was drawn, based on the axial vessel mentioned above, incorporating the defect, outlining the appropriate donor tissue and placing the expected incisions within the relaxed skin tension lines where possible.    The area thus outlined was incised deep to adipose tissue with a #15 scalpel blade.  The skin margins were undermined to an appropriate distance in all directions around the primary defect and laterally outward around the island pedicle utilizing iris scissors.  There was minimal undermining beneath the pedicle flap.

## 2023-05-23 NOTE — TELEPHONE ENCOUNTER
----- Message from Juan David Segura sent at 5/23/2023  9:41 AM CDT -----  Regarding: NIPT  Name of Who is Calling:  Patient          What is the request in detail:  Patient would like to have her results from her NIPT            Can the clinic reply by [facility]: No            [facility][phone]

## 2023-06-02 ENCOUNTER — ROUTINE PRENATAL (OUTPATIENT)
Dept: OBSTETRICS AND GYNECOLOGY | Facility: CLINIC | Age: 38
End: 2023-06-02
Payer: MEDICAID

## 2023-06-02 VITALS
SYSTOLIC BLOOD PRESSURE: 122 MMHG | WEIGHT: 169.56 LBS | BODY MASS INDEX: 28.21 KG/M2 | DIASTOLIC BLOOD PRESSURE: 76 MMHG

## 2023-06-02 DIAGNOSIS — Z34.80 SUPERVISION OF OTHER NORMAL PREGNANCY, ANTEPARTUM: Primary | ICD-10-CM

## 2023-06-02 PROCEDURE — 99212 OFFICE O/P EST SF 10 MIN: CPT | Mod: TH,S$PBB,, | Performed by: NURSE PRACTITIONER

## 2023-06-02 PROCEDURE — 99999 PR PBB SHADOW E&M-EST. PATIENT-LVL II: CPT | Mod: PBBFAC,,, | Performed by: NURSE PRACTITIONER

## 2023-06-02 PROCEDURE — 99212 OFFICE O/P EST SF 10 MIN: CPT | Mod: PBBFAC,TH,PN | Performed by: NURSE PRACTITIONER

## 2023-06-02 PROCEDURE — 99212 PR OFFICE/OUTPT VISIT, EST, LEVL II, 10-19 MIN: ICD-10-PCS | Mod: TH,S$PBB,, | Performed by: NURSE PRACTITIONER

## 2023-06-02 PROCEDURE — 99999 PR PBB SHADOW E&M-EST. PATIENT-LVL II: ICD-10-PCS | Mod: PBBFAC,,, | Performed by: NURSE PRACTITIONER

## 2023-06-02 NOTE — PROGRESS NOTES
Here for routine OB appt at 14w4d, with complaints of cramping and nausea. Reports not much of an appetite. Discussed increasing PO hydration and can try Protein shake or ensure to help with nutrients. No FM yet.  Denies VB and cramping.  Pain, bleeding, and PTL precautions given.  M anatomy scan ordered.   F/U scheduled 4 weeks

## 2023-06-05 ENCOUNTER — LAB VISIT (OUTPATIENT)
Dept: LAB | Facility: HOSPITAL | Age: 38
End: 2023-06-05
Attending: OBSTETRICS & GYNECOLOGY
Payer: MEDICAID

## 2023-06-05 ENCOUNTER — ROUTINE PRENATAL (OUTPATIENT)
Dept: OBSTETRICS AND GYNECOLOGY | Facility: CLINIC | Age: 38
End: 2023-06-05
Payer: MEDICAID

## 2023-06-05 VITALS
WEIGHT: 174.69 LBS | DIASTOLIC BLOOD PRESSURE: 82 MMHG | BODY MASS INDEX: 29.07 KG/M2 | SYSTOLIC BLOOD PRESSURE: 128 MMHG

## 2023-06-05 DIAGNOSIS — D50.8 IRON DEFICIENCY ANEMIA SECONDARY TO INADEQUATE DIETARY IRON INTAKE: ICD-10-CM

## 2023-06-05 DIAGNOSIS — E05.00 GRAVES DISEASE: ICD-10-CM

## 2023-06-05 DIAGNOSIS — Z34.80 SUPERVISION OF OTHER NORMAL PREGNANCY, ANTEPARTUM: ICD-10-CM

## 2023-06-05 DIAGNOSIS — Z34.80 SUPERVISION OF OTHER NORMAL PREGNANCY, ANTEPARTUM: Primary | ICD-10-CM

## 2023-06-05 LAB
IRON SERPL-MCNC: 51 UG/DL (ref 30–160)
SATURATED IRON: 11 % (ref 20–50)
T4 FREE SERPL-MCNC: 1 NG/DL (ref 0.71–1.51)
TOTAL IRON BINDING CAPACITY: 457 UG/DL (ref 250–450)
TRANSFERRIN SERPL-MCNC: 309 MG/DL (ref 200–375)
TSH SERPL DL<=0.005 MIU/L-ACNC: 0.45 UIU/ML (ref 0.4–4)

## 2023-06-05 PROCEDURE — 99212 OFFICE O/P EST SF 10 MIN: CPT | Mod: PBBFAC,TH | Performed by: OBSTETRICS & GYNECOLOGY

## 2023-06-05 PROCEDURE — 99213 OFFICE O/P EST LOW 20 MIN: CPT | Mod: TH,S$PBB,, | Performed by: OBSTETRICS & GYNECOLOGY

## 2023-06-05 PROCEDURE — 36415 COLL VENOUS BLD VENIPUNCTURE: CPT | Performed by: OBSTETRICS & GYNECOLOGY

## 2023-06-05 PROCEDURE — 99999 PR PBB SHADOW E&M-EST. PATIENT-LVL II: CPT | Mod: PBBFAC,,, | Performed by: OBSTETRICS & GYNECOLOGY

## 2023-06-05 PROCEDURE — 84443 ASSAY THYROID STIM HORMONE: CPT | Performed by: OBSTETRICS & GYNECOLOGY

## 2023-06-05 PROCEDURE — 82105 ALPHA-FETOPROTEIN SERUM: CPT | Performed by: OBSTETRICS & GYNECOLOGY

## 2023-06-05 PROCEDURE — 84439 ASSAY OF FREE THYROXINE: CPT | Performed by: OBSTETRICS & GYNECOLOGY

## 2023-06-05 PROCEDURE — 84466 ASSAY OF TRANSFERRIN: CPT | Performed by: OBSTETRICS & GYNECOLOGY

## 2023-06-05 PROCEDURE — 99213 PR OFFICE/OUTPT VISIT, EST, LEVL III, 20-29 MIN: ICD-10-PCS | Mod: TH,S$PBB,, | Performed by: OBSTETRICS & GYNECOLOGY

## 2023-06-05 PROCEDURE — 99999 PR PBB SHADOW E&M-EST. PATIENT-LVL II: ICD-10-PCS | Mod: PBBFAC,,, | Performed by: OBSTETRICS & GYNECOLOGY

## 2023-06-05 RX ORDER — FERROUS SULFATE 325(65) MG
325 TABLET, DELAYED RELEASE (ENTERIC COATED) ORAL DAILY
Qty: 90 TABLET | Refills: 3 | Status: SHIPPED | OUTPATIENT
Start: 2023-06-05 | End: 2024-06-04

## 2023-06-05 RX ORDER — ASPIRIN 81 MG/1
81 TABLET ORAL DAILY
Refills: 0 | Status: ON HOLD | COMMUNITY
Start: 2023-06-05 | End: 2023-11-22

## 2023-06-05 NOTE — PROGRESS NOTES
Complaints today:Ms. Toussaint  is doing well with no complaints. Normal fetal movements with no vaginal bleeding, LOF or contractions at this time.     /82   Wt 79.2 kg (174 lb 11.4 oz)   LMP 2023 (Exact Date)   BMI 29.07 kg/m²     37 y.o., at 16w6d by Estimated Date of Delivery: 23  Patient Active Problem List   Diagnosis    Pinguecula of left eye    History of ectopic pregnancy    Elevated blood-pressure reading without diagnosis of hypertension    Excessive and frequent menstruation    Graves disease    Goiter    Iron deficiency anemia    Migraine    Overweight with body mass index (BMI) 25.0-29.9    Vitamin D deficiency    Chronic pain of both shoulders    Amenorrhea    Positive pregnancy test     OB History    Para Term  AB Living   4 1 1 0 2 1   SAB IAB Ectopic Multiple Live Births   1 0 1 0 1      # Outcome Date GA Lbr Misbah/2nd Weight Sex Delivery Anes PTL Lv   4 Current            3 SAB 2022 12w0d    SAB      2 Ectopic 21     ECTOPIC      1 Term 04/17/15 38w0d  2.92 kg (6 lb 7 oz) F CS-LTranv EPI N STEVEN      Complications: VSD (ventricular septal defect)       Dating reviewed    Allergies and problem list reviewed and updated    Medical and surgical history reviewed    Prenatal labs reviewed and updated    Physical Exam:  ABD: soft, gravid, nontender, 15 cm    Assessment:  Doreen was seen today for routine prenatal visit.    Diagnoses and all orders for this visit:    Supervision of other normal pregnancy, antepartum  -     aspirin (ECOTRIN) 81 MG EC tablet; Take 1 tablet (81 mg total) by mouth once daily.  -     Maternal Screen AFP (Single Marker); Future    Iron deficiency anemia secondary to inadequate dietary iron intake  -     IRON AND TIBC; Future  -     ferrous sulfate 325 (65 FE) MG EC tablet; Take 1 tablet (325 mg total) by mouth once daily.    Graves disease  -     TSH; Future  -     T4, FREE; Future        Orders Placed This Encounter    Procedures    TSH    T4, FREE    IRON AND TIBC    Maternal Screen AFP (Single Marker)       RTC in 4 weeks

## 2023-06-08 LAB
# FETUSES US: NORMAL
AFP INTERPRETATION: NORMAL
AFP MOM SERPL: 1.25
AFP SERPL-MCNC: 35.4 NG/ML
AFP SERPL-MCNC: NEGATIVE NG/ML
AGE AT DELIVERY: 35
GA (DAYS): 0 D
GA (WEEKS): 15 WK
GESTATIONAL AGE METHOD: NORMAL
IDDM PATIENT QL: NORMAL
SMOKING STATUS FTND: NO

## 2023-06-12 ENCOUNTER — PATIENT MESSAGE (OUTPATIENT)
Dept: OTHER | Facility: OTHER | Age: 38
End: 2023-06-12
Payer: MEDICAID

## 2023-06-19 ENCOUNTER — PATIENT MESSAGE (OUTPATIENT)
Dept: OTHER | Facility: OTHER | Age: 38
End: 2023-06-19
Payer: MEDICAID

## 2023-06-30 ENCOUNTER — PATIENT MESSAGE (OUTPATIENT)
Dept: MATERNAL FETAL MEDICINE | Facility: CLINIC | Age: 38
End: 2023-06-30
Payer: MEDICAID

## 2023-07-03 ENCOUNTER — PATIENT MESSAGE (OUTPATIENT)
Dept: MATERNAL FETAL MEDICINE | Facility: CLINIC | Age: 38
End: 2023-07-03

## 2023-07-03 ENCOUNTER — PROCEDURE VISIT (OUTPATIENT)
Dept: MATERNAL FETAL MEDICINE | Facility: CLINIC | Age: 38
End: 2023-07-03
Payer: MEDICAID

## 2023-07-03 ENCOUNTER — ROUTINE PRENATAL (OUTPATIENT)
Dept: OBSTETRICS AND GYNECOLOGY | Facility: CLINIC | Age: 38
End: 2023-07-03
Payer: MEDICAID

## 2023-07-03 VITALS
SYSTOLIC BLOOD PRESSURE: 112 MMHG | DIASTOLIC BLOOD PRESSURE: 68 MMHG | BODY MASS INDEX: 29.09 KG/M2 | WEIGHT: 174.81 LBS

## 2023-07-03 DIAGNOSIS — Z34.80 SUPERVISION OF OTHER NORMAL PREGNANCY, ANTEPARTUM: Primary | ICD-10-CM

## 2023-07-03 DIAGNOSIS — Z34.80 SUPERVISION OF OTHER NORMAL PREGNANCY, ANTEPARTUM: ICD-10-CM

## 2023-07-03 DIAGNOSIS — Z36.2 ENCOUNTER FOR FOLLOW-UP ULTRASOUND OF FETAL ANATOMY: Primary | ICD-10-CM

## 2023-07-03 PROCEDURE — 76811 OB US DETAILED SNGL FETUS: CPT | Mod: PBBFAC | Performed by: OBSTETRICS & GYNECOLOGY

## 2023-07-03 PROCEDURE — 76811 US MFM PROCEDURE (VIEWPOINT): ICD-10-PCS | Mod: 26,S$PBB,, | Performed by: OBSTETRICS & GYNECOLOGY

## 2023-07-03 PROCEDURE — 99213 OFFICE O/P EST LOW 20 MIN: CPT | Mod: PBBFAC,TH | Performed by: OBSTETRICS & GYNECOLOGY

## 2023-07-03 PROCEDURE — 99213 PR OFFICE/OUTPT VISIT, EST, LEVL III, 20-29 MIN: ICD-10-PCS | Mod: TH,S$PBB,, | Performed by: OBSTETRICS & GYNECOLOGY

## 2023-07-03 PROCEDURE — 99999 PR PBB SHADOW E&M-EST. PATIENT-LVL III: ICD-10-PCS | Mod: PBBFAC,,, | Performed by: OBSTETRICS & GYNECOLOGY

## 2023-07-03 PROCEDURE — 99999 PR PBB SHADOW E&M-EST. PATIENT-LVL III: CPT | Mod: PBBFAC,,, | Performed by: OBSTETRICS & GYNECOLOGY

## 2023-07-03 PROCEDURE — 99213 OFFICE O/P EST LOW 20 MIN: CPT | Mod: TH,S$PBB,, | Performed by: OBSTETRICS & GYNECOLOGY

## 2023-07-03 NOTE — PROGRESS NOTES
Complaints today: Ms. Toussaint reports that she is doing well with no complaints. Normal fetal movements with no vaginal bleeding, LOF or contractions at this time.     /68   Wt 79.3 kg (174 lb 13.2 oz)   LMP 2023 (Exact Date)   BMI 29.09 kg/m²     37 y.o., at 19w0d by Estimated Date of Delivery: 23  Patient Active Problem List   Diagnosis    Pinguecula of left eye    History of ectopic pregnancy    Elevated blood-pressure reading without diagnosis of hypertension    Excessive and frequent menstruation    Graves disease    Goiter    Iron deficiency anemia    Migraine    Overweight with body mass index (BMI) 25.0-29.9    Vitamin D deficiency    Chronic pain of both shoulders    Amenorrhea    Positive pregnancy test    Supervision of other normal pregnancy, antepartum     OB History    Para Term  AB Living   4 1 1 0 2 1   SAB IAB Ectopic Multiple Live Births   1 0 1 0 1      # Outcome Date GA Lbr Misbah/2nd Weight Sex Delivery Anes PTL Lv   4 Current            3 SAB 2022 12w0d    SAB      2 Ectopic 21     ECTOPIC      1 Term 04/17/15 38w0d  2.92 kg (6 lb 7 oz) F CS-LTranv EPI N STEVEN      Complications: VSD (ventricular septal defect)       Dating reviewed    Allergies and problem list reviewed and updated    Medical and surgical history reviewed    Prenatal labs reviewed and updated    Physical Exam:  ABD: soft, gravid, nontender, 19CM    Assessment:  Doreen was seen today for routine prenatal visit.    Diagnoses and all orders for this visit:    Supervision of other normal pregnancy, antepartum  -     Maternal Screen AFP (Single Marker); Future        Orders Placed This Encounter   Procedures    Maternal Screen AFP (Single Marker)       Follow up in about 4 weeks (around 2023) for Routine OB.

## 2023-07-10 ENCOUNTER — PATIENT MESSAGE (OUTPATIENT)
Dept: OTHER | Facility: OTHER | Age: 38
End: 2023-07-10
Payer: MEDICAID

## 2023-07-31 ENCOUNTER — PATIENT MESSAGE (OUTPATIENT)
Dept: MATERNAL FETAL MEDICINE | Facility: CLINIC | Age: 38
End: 2023-07-31
Payer: MEDICAID

## 2023-08-01 ENCOUNTER — PROCEDURE VISIT (OUTPATIENT)
Dept: MATERNAL FETAL MEDICINE | Facility: CLINIC | Age: 38
End: 2023-08-01
Payer: MEDICAID

## 2023-08-01 ENCOUNTER — ROUTINE PRENATAL (OUTPATIENT)
Dept: OBSTETRICS AND GYNECOLOGY | Facility: CLINIC | Age: 38
End: 2023-08-01
Payer: MEDICAID

## 2023-08-01 VITALS
BODY MASS INDEX: 29.43 KG/M2 | DIASTOLIC BLOOD PRESSURE: 70 MMHG | SYSTOLIC BLOOD PRESSURE: 124 MMHG | WEIGHT: 176.88 LBS

## 2023-08-01 DIAGNOSIS — Z36.2 ENCOUNTER FOR FOLLOW-UP ULTRASOUND OF FETAL ANATOMY: ICD-10-CM

## 2023-08-01 DIAGNOSIS — Z36.89 ENCOUNTER FOR ULTRASOUND TO CHECK FETAL GROWTH: Primary | ICD-10-CM

## 2023-08-01 DIAGNOSIS — Z34.80 SUPERVISION OF OTHER NORMAL PREGNANCY, ANTEPARTUM: Primary | ICD-10-CM

## 2023-08-01 DIAGNOSIS — O34.219 HISTORY OF CESAREAN SECTION COMPLICATING PREGNANCY: ICD-10-CM

## 2023-08-01 PROCEDURE — 76816 US OB/GYN EXTENDED PROCEDURE (VIEWPOINT): ICD-10-PCS | Mod: 26,S$PBB,, | Performed by: OBSTETRICS & GYNECOLOGY

## 2023-08-01 PROCEDURE — 99213 OFFICE O/P EST LOW 20 MIN: CPT | Mod: PBBFAC,TH | Performed by: OBSTETRICS & GYNECOLOGY

## 2023-08-01 PROCEDURE — 76816 OB US FOLLOW-UP PER FETUS: CPT | Mod: PBBFAC | Performed by: OBSTETRICS & GYNECOLOGY

## 2023-08-01 PROCEDURE — 99213 PR OFFICE/OUTPT VISIT, EST, LEVL III, 20-29 MIN: ICD-10-PCS | Mod: TH,S$PBB,, | Performed by: OBSTETRICS & GYNECOLOGY

## 2023-08-01 PROCEDURE — 99999 PR PBB SHADOW E&M-EST. PATIENT-LVL III: CPT | Mod: PBBFAC,,, | Performed by: OBSTETRICS & GYNECOLOGY

## 2023-08-01 PROCEDURE — 99213 OFFICE O/P EST LOW 20 MIN: CPT | Mod: TH,S$PBB,, | Performed by: OBSTETRICS & GYNECOLOGY

## 2023-08-01 PROCEDURE — 99999 PR PBB SHADOW E&M-EST. PATIENT-LVL III: ICD-10-PCS | Mod: PBBFAC,,, | Performed by: OBSTETRICS & GYNECOLOGY

## 2023-08-01 NOTE — PROGRESS NOTES
Complaints today: Ms. Toussaint reports that she is doing well with no complaints. Normal fetal movements with no vaginal bleeding, LOF or contractions at this time.     /70   Wt 80.2 kg (176 lb 14 oz)   LMP 2023 (Exact Date)   BMI 29.43 kg/m²     37 y.o., at 23w1d by Estimated Date of Delivery: 23  Patient Active Problem List   Diagnosis    Pinguecula of left eye    History of ectopic pregnancy    Elevated blood-pressure reading without diagnosis of hypertension    Excessive and frequent menstruation    Graves disease    Goiter    Iron deficiency anemia    Migraine    Overweight with body mass index (BMI) 25.0-29.9    Vitamin D deficiency    Chronic pain of both shoulders    Amenorrhea    Positive pregnancy test    Supervision of other normal pregnancy, antepartum     OB History    Para Term  AB Living   4 1 1 0 2 1   SAB IAB Ectopic Multiple Live Births   1 0 1 0 1      # Outcome Date GA Lbr Misbah/2nd Weight Sex Delivery Anes PTL Lv   4 Current            3 SAB 2022 12w0d    SAB      2 Ectopic 21     ECTOPIC      1 Term 04/17/15 38w0d  2.92 kg (6 lb 7 oz) F CS-LTranv EPI N STEVEN      Complications: VSD (ventricular septal defect)       Dating reviewed    Allergies and problem list reviewed and updated    Medical and surgical history reviewed    Prenatal labs reviewed and updated    Physical Exam:  ABD: soft, gravid, nontender,    Assessment:  Doreen was seen today for routine prenatal visit.    Diagnoses and all orders for this visit:    Supervision of other normal pregnancy, antepartum  -     CBC Auto Differential; Future  -     OB Glucose Screen; Future    History of  section complicating pregnancy  - Records from  provided by patient  - Admitted on  afternoon, got to 4cm with 1LTCS at  at 1700  - Discussed risk with  with uterine rupture 0.3-0.7% risk      Orders Placed This Encounter   Procedures    CBC Auto Differential    OB  Glucose Screen       Follow up in about 4 weeks (around 8/29/2023) for Routine OB.

## 2023-08-21 ENCOUNTER — PATIENT MESSAGE (OUTPATIENT)
Dept: OTHER | Facility: OTHER | Age: 38
End: 2023-08-21
Payer: MEDICAID

## 2023-08-22 ENCOUNTER — HOSPITAL ENCOUNTER (EMERGENCY)
Facility: OTHER | Age: 38
Discharge: HOME | End: 2023-08-23
Attending: EMERGENCY MEDICINE
Payer: MEDICAID

## 2023-08-22 DIAGNOSIS — Z34.92 SECOND TRIMESTER PREGNANCY: ICD-10-CM

## 2023-08-22 DIAGNOSIS — Z3A.26 26 WEEKS GESTATION OF PREGNANCY: ICD-10-CM

## 2023-08-22 DIAGNOSIS — R42 DIZZINESS: ICD-10-CM

## 2023-08-22 DIAGNOSIS — F43.0 STRESS REACTION: Primary | ICD-10-CM

## 2023-08-22 PROCEDURE — 59025 FETAL NON-STRESS TEST: CPT

## 2023-08-22 PROCEDURE — 93005 ELECTROCARDIOGRAM TRACING: CPT

## 2023-08-22 PROCEDURE — 99284 EMERGENCY DEPT VISIT MOD MDM: CPT | Mod: 25

## 2023-08-22 PROCEDURE — 25000003 PHARM REV CODE 250: Performed by: EMERGENCY MEDICINE

## 2023-08-22 PROCEDURE — 93010 EKG 12-LEAD: ICD-10-PCS | Mod: ,,, | Performed by: INTERNAL MEDICINE

## 2023-08-22 PROCEDURE — 93010 ELECTROCARDIOGRAM REPORT: CPT | Mod: ,,, | Performed by: INTERNAL MEDICINE

## 2023-08-22 RX ORDER — SODIUM CHLORIDE 9 MG/ML
1000 INJECTION, SOLUTION INTRAVENOUS
Status: COMPLETED | OUTPATIENT
Start: 2023-08-22 | End: 2023-08-22

## 2023-08-22 RX ADMIN — SODIUM CHLORIDE 1000 ML: 0.9 INJECTION, SOLUTION INTRAVENOUS at 09:08

## 2023-08-23 VITALS
HEART RATE: 90 BPM | BODY MASS INDEX: 29.66 KG/M2 | OXYGEN SATURATION: 99 % | SYSTOLIC BLOOD PRESSURE: 106 MMHG | TEMPERATURE: 98 F | HEIGHT: 65 IN | RESPIRATION RATE: 18 BRPM | DIASTOLIC BLOOD PRESSURE: 70 MMHG | WEIGHT: 178 LBS

## 2023-08-23 PROCEDURE — 59025 FETAL NON-STRESS TEST: CPT | Mod: 26,59,, | Performed by: OBSTETRICS & GYNECOLOGY

## 2023-08-23 PROCEDURE — 59025 PR FETAL 2N-STRESS TEST: ICD-10-PCS | Mod: 26,59,, | Performed by: OBSTETRICS & GYNECOLOGY

## 2023-08-23 PROCEDURE — 76815 OB US LIMITED FETUS(S): CPT | Mod: 26,,, | Performed by: OBSTETRICS & GYNECOLOGY

## 2023-08-23 PROCEDURE — 76815 PR  US,PREGNANT UTERUS,LIMITED, 1/> FETUSES: ICD-10-PCS | Mod: 26,,, | Performed by: OBSTETRICS & GYNECOLOGY

## 2023-08-23 PROCEDURE — 59025 FETAL NON-STRESS TEST: CPT

## 2023-08-23 PROCEDURE — 99284 EMERGENCY DEPT VISIT MOD MDM: CPT | Mod: 25,,, | Performed by: OBSTETRICS & GYNECOLOGY

## 2023-08-23 PROCEDURE — 99284 PR EMERGENCY DEPT VISIT,LEVEL IV: ICD-10-PCS | Mod: 25,,, | Performed by: OBSTETRICS & GYNECOLOGY

## 2023-08-23 NOTE — ED TRIAGE NOTES
Panic Attack (EMS called out for a female having an anxiety attack after not being able to find her daughter, pt was located by mother at a friends house and EMS was able to calm pt using breathing techniques. Pt in no obvious distress at this time, she denies CP no SOB. ) patient states she wants to be checked by OB visual on baby at this time

## 2023-08-23 NOTE — ED PROVIDER NOTES
Encounter Date: 2023       History     Chief Complaint   Patient presents with    Panic Attack     EMS called out for a female having an anxiety attack after not being able to find her daughter, pt was located by mother at a friends house and EMS was able to calm pt using breathing techniques. Pt in no obvious distress at this time, she denies CP no SOB.      Amber Toussaint is a 37 y.o. [id number] at 26w1d presents after being evaluated in ED following a panic attack. She was received IV fluids in ED and was cleared by ED physician, however FHTs were reported to be elevated so patient presents for evaluation of fetus.   This IUP is complicated by AMA, h/o Grave's disease, and h/o C/section.  Patient denies contractions, denies vaginal bleeding, denies LOF.   Fetal Movement: normal.       Review of patient's allergies indicates:   Allergen Reactions    Vinyl ether Hives     Past Medical History:   Diagnosis Date    Graves disease     Iron deficiency anemia 2021     Past Surgical History:   Procedure Laterality Date    BREAST SURGERY      Cysts removed     SECTION      DIAGNOSTIC LAPAROSCOPY N/A 2021    Procedure: LAPAROSCOPY, DIAGNOSTIC, possible salpingostomy/ salpingectomy/  D& C;  Surgeon: Cheri Hunter MD;  Location: [facility];  Service: OB/GYN;  Laterality: N/A;    DILATION AND CURETTAGE OF UTERUS  2021    Procedure: DILATION AND CURETTAGE, UTERUS;  Surgeon: Cheri Hunter MD;  Location: [facility] OR;  Service: OB/GYN;;    fibroid removal  ,     breast    HEMANGIOMA EXCISION      right thigh    LAPAROSCOPIC SALPINGECTOMY Right 2021    Procedure: SALPINGECTOMY, LAPAROSCOPIC;  Surgeon: Cheri Hunter MD;  Location: [facility];  Service: OB/GYN;  Laterality: Right;     Family History   Problem Relation Age of Onset    Pacemaker/defibrilator Mother     Hypertension Mother     Kidney failure Mother     Hypertension Father     Throat cancer Father     Breast cancer  Maternal Grandmother     Hypertension Maternal Grandmother     Colon cancer Paternal Grandfather      labor Neg Hx     Stroke Neg Hx     Diabetes Neg Hx      Social History     Tobacco Use    Smoking status: Never    Smokeless tobacco: Never   Substance Use Topics    Alcohol use: Not Currently     Comment: occasional    Drug use: No     Review of Systems   Constitutional:  Negative for chills and fever.   HENT:  Negative for congestion.    Eyes:  Negative for visual disturbance.   Respiratory:  Negative for shortness of breath.    Cardiovascular:  Negative for chest pain and palpitations.   Gastrointestinal:  Negative for constipation, diarrhea, nausea and vomiting.   Genitourinary:  Negative for dysuria, vaginal bleeding and vaginal discharge.   Musculoskeletal:  Negative for back pain.   Neurological:  Negative for light-headedness and headaches.   Psychiatric/Behavioral:  Negative for agitation and suicidal ideas. The patient is not nervous/anxious.        Physical Exam     Initial Vitals [23]   BP Pulse Resp Temp SpO2   121/76 100 18 98.4 °F (36.9 °C) 98 %      MAP       --         Physical Exam    Vitals reviewed.  Constitutional: She appears well-developed and well-nourished. She is not diaphoretic. No distress.   HENT:   Head: Normocephalic and atraumatic.   Eyes: Conjunctivae are normal.   Cardiovascular:  Normal rate.           Pulmonary/Chest: No respiratory distress.   Abdominal: There is no abdominal tenderness. There is no rebound and no guarding.     Neurological: She is alert and oriented to person, place, and time.   Skin: Skin is warm and dry.   Psychiatric: She has a normal mood and affect.         ED Course   Fetal non-stress test    Date/Time: 2023 12:09 AM    Performed by: Shavon Laird MD  Authorized by: Shavon Laird MD    Nonstress Test:     Variability:  6-25 BPM    Decelerations:  None    Accelerations:  10 bpm    Acoustic Stimulator: No      Baseline:  140     Contractions:  Not present  Biophysical Profile:     MVP (Maximal Vertical Pocket):  4.99    Nonstress Test Interpretation: reactive      Overall Impression:  Reassuring  Post-procedure:      MVP 4.99 cm    Labs Reviewed - No data to display     ECG Results              EKG 12-lead (Final result)  Result time 08/23/23 08:02:47      Final result by Interface, Lab In [facility] (08/23/23 08:02:47)                   Narrative:    Test Reason : R42,    Vent. Rate : 097 BPM     Atrial Rate : 097 BPM     P-R Int : 162 ms          QRS Dur : 072 ms      QT Int : 340 ms       P-R-T Axes : 056 066 012 degrees     QTc Int : 431 ms    Normal sinus rhythm  Nonspecific T wave abnormality  Abnormal ECG    Confirmed by Jerrod Rodriguez MD ([id number]) on 8/23/2023 8:02:38 AM    Referred By: HOWARD   SELF           Confirmed By:Jerrod Rodriguez MD                                  Imaging Results    None            Medications   0.9%  NaCl infusion (1,000 mLs Intravenous New Bag 8/22/23 2159)     Medical Decision Making  VSS  NST RR  [place] quiet  Patient states her symptoms she arrived to the main ED with have resolved  US MVP 4.99  Counseled patient NST is reassuring and continue follow up with primary OB  Patient stable for discharge at this time  ED return precautions given  She voiced understanding and is in agreement with the plan    Shavon Chen MD  Obstetrics and Gynecology, PGY-1    Risk  Prescription drug management.              Attending Attestation:   Physician Attestation Statement for Resident:  As the supervising MD   Physician Attestation Statement: I have personally seen and examined this patient.   I agree with the above history.  -:   As the supervising MD I agree with the above PE.     As the supervising MD I agree with the above treatment, course, plan, and disposition.   -: NST reactive and reassuring, toco with no contractions.  Patient cleared in main ED and is currently asymptomatic.  EKG was normal sinus  rhythm in ED and VSS without tachycardia.  MVP normal.  Agree with discharge to home.  Will f/u with OB in 1 week.   I was personally present during the critical portions of the procedure(s) performed by the resident and was immediately available in the ED to provide services and assistance as needed during the entire procedure.   I have reviewed the following: old records at this facility.                ED Course as of 08/30/23 2711   Tue Aug 22, 2023   2138 36 y/o female currently in her 3rd trimester presents complaining of feeling dizzy after having a stressful event tonight when her daughter went missing.  Her main concern is to make sure that the baby is okay.  She currently denies any abdominal pain or vaginal bleeding.  I did discuss with her the plan to give her IV fluids and an EKG here as well as check fetal heart tones.  She would also like an ultrasound.  She understands that to have further evaluation for the pregnancy she would need to go to the OB ED. She would like to 1st receive IV fluids before being transferred upstairs. [AA]   2302 On re-evaluation the patient is sitting comfortably in bed and reports feeling much better.  Fetal heart tones elevated, but this was shortly after arrival while she was still anxious and prior to receiving the fluids.  Suspect that has improved.  The patient will be transferred at this time up to the OB ED for further evaluation as needed. [AA]      ED Course User Index  [AA] Jana Almeida MD                    Clinical Impression:   Final diagnoses:  [R42] Dizziness  [Z34.92] Second trimester pregnancy  [F43.0] Stress reaction (Primary)  [Z3A.26] 26 weeks gestation of pregnancy        ED Disposition Condition    Discharge Stable          ED Prescriptions    None       Follow-up Information    None          Shavon Laird MD  Resident  08/23/23 0611       Mey Cornelius MD  08/30/23 1131

## 2023-08-23 NOTE — ED PROVIDER NOTES
Encounter Date: 2023    SCRIBE #1 NOTE: I, Yamila Mcmullen, am scribing for, and in the presence of,  Jana Almeida MD.       History     Chief Complaint   Patient presents with    Panic Attack     EMS called out for a female having an anxiety attack after not being able to find her daughter, pt was located by mother at a friends house and EMS was able to calm pt using breathing techniques. Pt in no obvious distress at this time, she denies CP no SOB.      Amber Toussaint is a 37 y.o. female at 26 weeks gestation with a PMHx of Grave's disease, who presents to the ED via EMS for evaluation of panic attack. Pt reports she was unable to find her daughter earlier this evening after she walked to a friend's house. She states she called the police and began having a panic attack. After 45 minutes, she did find her daughter and felt her anxiety subsequently got acutely worse at 7p. She endorses symptoms of 1 episode of urinary incontinence and ongoing nausea, dizziness, and headache. She denies abdominal pain, vaginal bleeding, or discharge. She was otherwise at her normal baseline prior to this episode. This is the extent of the patient's complaints at this time.    The history is provided by the patient.     Review of patient's allergies indicates:   Allergen Reactions    Vinyl ether Hives     Past Medical History:   Diagnosis Date    Graves disease     Iron deficiency anemia 2021     Past Surgical History:   Procedure Laterality Date    BREAST SURGERY      Cysts removed     SECTION      DIAGNOSTIC LAPAROSCOPY N/A 2021    Procedure: LAPAROSCOPY, DIAGNOSTIC, possible salpingostomy/ salpingectomy/  D& C;  Surgeon: Cheri Hunter MD;  Location: [facility] OR;  Service: OB/GYN;  Laterality: N/A;    DILATION AND CURETTAGE OF UTERUS  2021    Procedure: DILATION AND CURETTAGE, UTERUS;  Surgeon: Cheri Hunter MD;  Location: [facility] OR;  Service: OB/GYN;;    fibroid removal  ,     breast     HEMANGIOMA EXCISION  2004    right thigh    LAPAROSCOPIC SALPINGECTOMY Right 2021    Procedure: SALPINGECTOMY, LAPAROSCOPIC;  Surgeon: Cheri Hunter MD;  Location: [facility];  Service: OB/GYN;  Laterality: Right;     Family History   Problem Relation Age of Onset    Pacemaker/defibrilator Mother     Hypertension Mother     Kidney failure Mother     Hypertension Father     Throat cancer Father     Breast cancer Maternal Grandmother     Hypertension Maternal Grandmother     Colon cancer Paternal Grandfather      labor Neg Hx     Stroke Neg Hx     Diabetes Neg Hx      Social History     Tobacco Use    Smoking status: Never    Smokeless tobacco: Never   Substance Use Topics    Alcohol use: Not Currently     Comment: occasional    Drug use: No     Review of Systems   Constitutional:  Negative for chills and fever.   HENT:  Negative for congestion and sore throat.    Eyes:  Negative for visual disturbance.   Respiratory:  Negative for cough and shortness of breath.    Cardiovascular:  Negative for chest pain and palpitations.   Gastrointestinal:  Positive for nausea. Negative for abdominal pain, diarrhea and vomiting.   Genitourinary:  Negative for decreased urine volume, dysuria and vaginal discharge.   Musculoskeletal:  Negative for joint swelling, neck pain and neck stiffness.   Skin:  Negative for rash and wound.   Neurological:  Positive for dizziness and headaches. Negative for weakness and numbness.   Psychiatric/Behavioral:  Negative for confusion. The patient is nervous/anxious.        Physical Exam     Initial Vitals [23]   BP Pulse Resp Temp SpO2   121/76 100 18 98.4 °F (36.9 °C) 98 %      MAP       --         Physical Exam    Constitutional: She appears well-developed and well-nourished. She does not have a sickly appearance. No distress.   HENT:   Head: Normocephalic and atraumatic.   Right Ear: External ear normal.   Left Ear: External ear normal.   Eyes: Conjunctivae, EOM and lids  are normal. Right eye exhibits no discharge. Left eye exhibits no discharge. Right conjunctiva is not injected. Right conjunctiva has no hemorrhage. Left conjunctiva is not injected. Left conjunctiva has no hemorrhage. No scleral icterus.   Neck: Phonation normal. No stridor present. No tracheal deviation present.   Normal range of motion.  Cardiovascular:  Regular rhythm and normal heart sounds.   Tachycardia present.   Exam reveals no friction rub.       No murmur heard.  Pulses:       Radial pulses are 2+ on the right side and 2+ on the left side.        Dorsalis pedis pulses are 2+ on the right side and 2+ on the left side.   Pulmonary/Chest: Breath sounds normal. No respiratory distress. She has no wheezes. She has no rhonchi. She has no rales.   Abdominal: Abdomen is soft. There is no abdominal tenderness.   Gravid abdomen. There is no rebound and no guarding.   Musculoskeletal:      Cervical back: Normal range of motion.     Neurological: She is alert and oriented to person, place, and time. She has normal strength. GCS eye subscore is 4. GCS verbal subscore is 5. GCS motor subscore is 6.   Skin: Skin is warm.   Psychiatric: She has a normal mood and affect. Her speech is normal and behavior is normal. Judgment and thought content normal. Cognition and memory are normal.         ED Course   Procedures  Labs Reviewed - No data to display  EKG Readings: (Independently Interpreted)   Normal sinus rhythm. Rate of 97. T wave inversion in lead 3. No significant ST or T wave changes.       Imaging Results    None          Medications   0.9%  NaCl infusion (1,000 mLs Intravenous New Bag 8/22/23 7521)     Medical Decision Making  Amount and/or Complexity of Data Reviewed  ECG/medicine tests: ordered and independent interpretation performed.    Risk  Prescription drug management.            Scribe Attestation:   Scribe #1: I performed the above scribed service and the documentation accurately describes the services I  performed. I attest to the accuracy of the note.      I, Jana Almeida , personally performed the services described in this documentation. All medical record entries made by the scribe were at my direction and in my presence. I have reviewed the chart and agree that the record reflects my personal performance and is accurate and complete.      ED Course as of 08/22/23 2305   Tue Aug 22, 2023   2138 38 y/o female currently in her 2nd trimester presents complaining of feeling dizzy after having a stressful event tonight when her daughter went missing.  Her main concern is to make sure that the baby is okay.  She currently denies any abdominal pain or vaginal bleeding.  I did discuss with her the plan to give her IV fluids and an EKG here as well as check fetal heart tones.  She would also like an ultrasound.  She understands that to have further evaluation for the pregnancy she would need to go to the OB ED. She would like to 1st receive IV fluids before being transferred upstairs. [AA]   2302 On re-evaluation the patient is sitting comfortably in bed and reports feeling much better.  Fetal heart tones elevated, but this was shortly after arrival while she was still anxious and prior to receiving the fluids.  Suspect that has improved.  The patient will be transferred at this time up to the OB ED for further evaluation as needed. [AA]      ED Course User Index  [AA] Jana Almeida MD                    Clinical Impression:   Final diagnoses:  [R42] Dizziness  [Z34.92] Second trimester pregnancy (Primary)        ED Disposition Condition    Send to L&D Stable                Jana Almeida MD  08/22/23 2304       Jana Almeida MD  08/22/23 2306

## 2023-08-23 NOTE — DISCHARGE INSTRUCTIONS
Call clinic ([phone]) or Labor & Delivery ([phone]) for any of the following:   - Vaginal bleeding   - Leakage of fluid  - Contractions 3-5 minutes apart for 2 hours or more  - Decreased fetal movement (10 or more movements in 2 hours, beginning at 28 weeks gestation)   - Headache unrelieved by Tylenol, pain underneath your right breast, or blurry vision  - Temperature of 100.4 or greater

## 2023-08-29 ENCOUNTER — ROUTINE PRENATAL (OUTPATIENT)
Dept: OBSTETRICS AND GYNECOLOGY | Facility: CLINIC | Age: 38
End: 2023-08-29
Payer: MEDICAID

## 2023-08-29 VITALS — DIASTOLIC BLOOD PRESSURE: 72 MMHG | SYSTOLIC BLOOD PRESSURE: 126 MMHG | BODY MASS INDEX: 30.1 KG/M2 | WEIGHT: 180.88 LBS

## 2023-08-29 DIAGNOSIS — Z34.80 SUPERVISION OF OTHER NORMAL PREGNANCY, ANTEPARTUM: Primary | ICD-10-CM

## 2023-08-29 PROCEDURE — 99213 OFFICE O/P EST LOW 20 MIN: CPT | Mod: PBBFAC,TH | Performed by: OBSTETRICS & GYNECOLOGY

## 2023-08-29 PROCEDURE — 99213 OFFICE O/P EST LOW 20 MIN: CPT | Mod: TH,S$PBB,, | Performed by: OBSTETRICS & GYNECOLOGY

## 2023-08-29 PROCEDURE — 99999 PR PBB SHADOW E&M-EST. PATIENT-LVL III: CPT | Mod: PBBFAC,,, | Performed by: OBSTETRICS & GYNECOLOGY

## 2023-08-29 PROCEDURE — 99999 PR PBB SHADOW E&M-EST. PATIENT-LVL III: ICD-10-PCS | Mod: PBBFAC,,, | Performed by: OBSTETRICS & GYNECOLOGY

## 2023-08-29 PROCEDURE — 99213 PR OFFICE/OUTPT VISIT, EST, LEVL III, 20-29 MIN: ICD-10-PCS | Mod: TH,S$PBB,, | Performed by: OBSTETRICS & GYNECOLOGY

## 2023-08-29 NOTE — PROGRESS NOTES
Complaints today: Ms. Toussaint reports that she is doing well. She does have right leg pain/stickenss in the morning. Normal fetal movements with no vaginal bleeding, LOF or contractions at this time.     /72   Wt 82.1 kg (180 lb 14.2 oz)   LMP 2023 (Exact Date)   BMI 30.10 kg/m²     37 y.o., at 27w1d by Estimated Date of Delivery: 23  Patient Active Problem List   Diagnosis    Pinguecula of left eye    History of ectopic pregnancy    Elevated blood-pressure reading without diagnosis of hypertension    Excessive and frequent menstruation    Graves disease    Goiter    Iron deficiency anemia    Migraine    Overweight with body mass index (BMI) 25.0-29.9    Vitamin D deficiency    Chronic pain of both shoulders    Amenorrhea    Positive pregnancy test    Supervision of other normal pregnancy, antepartum     OB History    Para Term  AB Living   4 1 1 0 2 1   SAB IAB Ectopic Multiple Live Births   1 0 1 0 1      # Outcome Date GA Lbr Misbah/2nd Weight Sex Delivery Anes PTL Lv   4 Current            3 SAB 2022 12w0d    SAB      2 Ectopic 21     ECTOPIC      1 Term 04/17/15 38w0d  2.92 kg (6 lb 7 oz) F CS-LTranv EPI N STEVEN      Complications: VSD (ventricular septal defect)       Dating reviewed    Allergies and problem list reviewed and updated    Medical and surgical history reviewed    Prenatal labs reviewed and updated    Physical Exam:  ABD: soft, gravid, nontender, 28cm    Assessment:  Doreen was seen today for routine prenatal visit.    Diagnoses and all orders for this visit:    Supervision of other normal pregnancy, antepartum  - Encouraged patient to have glucose screen and CBC done  - Multiple questions reviewed with patient       No orders of the defined types were placed in this encounter.      Follow up in about 4 weeks (around 2023) for Routine OB.

## 2023-09-04 ENCOUNTER — PATIENT MESSAGE (OUTPATIENT)
Dept: OTHER | Facility: OTHER | Age: 38
End: 2023-09-04
Payer: MEDICAID

## 2023-09-18 ENCOUNTER — PATIENT MESSAGE (OUTPATIENT)
Dept: OTHER | Facility: OTHER | Age: 38
End: 2023-09-18
Payer: MEDICAID

## 2023-09-22 ENCOUNTER — ROUTINE PRENATAL (OUTPATIENT)
Dept: OBSTETRICS AND GYNECOLOGY | Facility: CLINIC | Age: 38
End: 2023-09-22
Payer: MEDICAID

## 2023-09-22 VITALS
SYSTOLIC BLOOD PRESSURE: 126 MMHG | WEIGHT: 189.06 LBS | BODY MASS INDEX: 31.46 KG/M2 | DIASTOLIC BLOOD PRESSURE: 78 MMHG

## 2023-09-22 DIAGNOSIS — Z34.80 SUPERVISION OF OTHER NORMAL PREGNANCY, ANTEPARTUM: Primary | ICD-10-CM

## 2023-09-22 PROCEDURE — 99213 OFFICE O/P EST LOW 20 MIN: CPT | Mod: TH,S$PBB,, | Performed by: OBSTETRICS & GYNECOLOGY

## 2023-09-22 PROCEDURE — 99213 PR OFFICE/OUTPT VISIT, EST, LEVL III, 20-29 MIN: ICD-10-PCS | Mod: TH,S$PBB,, | Performed by: OBSTETRICS & GYNECOLOGY

## 2023-09-22 PROCEDURE — 99999 PR PBB SHADOW E&M-EST. PATIENT-LVL III: CPT | Mod: PBBFAC,,, | Performed by: OBSTETRICS & GYNECOLOGY

## 2023-09-22 PROCEDURE — 99999 PR PBB SHADOW E&M-EST. PATIENT-LVL III: ICD-10-PCS | Mod: PBBFAC,,, | Performed by: OBSTETRICS & GYNECOLOGY

## 2023-09-22 PROCEDURE — 99213 OFFICE O/P EST LOW 20 MIN: CPT | Mod: PBBFAC,TH | Performed by: OBSTETRICS & GYNECOLOGY

## 2023-09-22 NOTE — PROGRESS NOTES
Complaints today: Ms. Soriano reports that she is doing well. She continues to have SOB when she is moving too much. She continues to have hip pain. She just received her belt yesterday.  Normal fetal movements with no vaginal bleeding, LOF or contractions at this time.      /78   Wt 85.7 kg (189 lb 0.7 oz)   LMP 2023 (Exact Date)   BMI 31.46 kg/m²     37 y.o., at 30w4d by Estimated Date of Delivery: 23  Patient Active Problem List   Diagnosis    Pinguecula of left eye    History of ectopic pregnancy    Elevated blood-pressure reading without diagnosis of hypertension    Excessive and frequent menstruation    Graves disease    Goiter    Iron deficiency anemia    Migraine    Overweight with body mass index (BMI) 25.0-29.9    Vitamin D deficiency    Chronic pain of both shoulders    Amenorrhea    Positive pregnancy test    Supervision of other normal pregnancy, antepartum     OB History    Para Term  AB Living   4 1 1 0 2 1   SAB IAB Ectopic Multiple Live Births   1 0 1 0 1      # Outcome Date GA Lbr Misbah/2nd Weight Sex Delivery Anes PTL Lv   4 Current            3 SAB 2022 12w0d    SAB      2 Ectopic 21     ECTOPIC      1 Term 04/17/15 38w0d  2.92 kg (6 lb 7 oz) F CS-LTranv EPI N STEVEN      Complications: VSD (ventricular septal defect)       Dating reviewed    Allergies and problem list reviewed and updated    Medical and surgical history reviewed    Prenatal labs reviewed and updated    Physical Exam:  ABD: soft, gravid, nontender, 31cm    Assessment:  Doreen was seen today for routine prenatal visit.    Diagnoses and all orders for this visit:    Supervision of other normal pregnancy, antepartum  - Doing well  - TDAP at next visit  - Encouraged patient to have glucose screen done      No orders of the defined types were placed in this encounter.      Follow up in about 2 weeks (around 10/6/2023) for Routine OB.

## 2023-09-29 ENCOUNTER — PATIENT MESSAGE (OUTPATIENT)
Dept: OBSTETRICS AND GYNECOLOGY | Facility: CLINIC | Age: 38
End: 2023-09-29
Payer: MEDICAID

## 2023-10-02 ENCOUNTER — TELEPHONE (OUTPATIENT)
Dept: OBSTETRICS AND GYNECOLOGY | Facility: CLINIC | Age: 38
End: 2023-10-02
Payer: MEDICAID

## 2023-10-02 ENCOUNTER — TELEPHONE (OUTPATIENT)
Dept: OBSTETRICS AND GYNECOLOGY | Facility: HOSPITAL | Age: 38
End: 2023-10-02
Payer: MEDICAID

## 2023-10-02 ENCOUNTER — PROCEDURE VISIT (OUTPATIENT)
Dept: MATERNAL FETAL MEDICINE | Facility: CLINIC | Age: 38
End: 2023-10-02
Payer: MEDICAID

## 2023-10-02 ENCOUNTER — PATIENT MESSAGE (OUTPATIENT)
Dept: OTHER | Facility: OTHER | Age: 38
End: 2023-10-02
Payer: MEDICAID

## 2023-10-02 DIAGNOSIS — Z36.89 ENCOUNTER FOR ULTRASOUND TO CHECK FETAL GROWTH: ICD-10-CM

## 2023-10-02 DIAGNOSIS — O99.810 ABNORMAL GLUCOSE AFFECTING PREGNANCY: Primary | ICD-10-CM

## 2023-10-02 PROCEDURE — 76816 OB US FOLLOW-UP PER FETUS: CPT | Mod: PBBFAC | Performed by: OBSTETRICS & GYNECOLOGY

## 2023-10-02 PROCEDURE — 76816 US MFM PROCEDURE (VIEWPOINT): ICD-10-PCS | Mod: 26,S$PBB,, | Performed by: OBSTETRICS & GYNECOLOGY

## 2023-10-02 NOTE — TELEPHONE ENCOUNTER
Spoke with pt and rescheduled with Dr. Maurer on 10/09 to discuss results.VU.      ----- Message from Savanna Barr sent at 10/2/2023  8:42 AM CDT -----  Regarding: patient call back  Type: Patient Call Back    Who called: Self     What is the request in detail: Asked for a call back from the nurse about her upcoming apt for tomorrow     Can the clinic reply by [facility]? No     Would the patient rather a call back or a response via [facility]? Call     Best call back number: .[phone]

## 2023-10-09 ENCOUNTER — ROUTINE PRENATAL (OUTPATIENT)
Dept: OBSTETRICS AND GYNECOLOGY | Facility: CLINIC | Age: 38
End: 2023-10-09
Payer: MEDICAID

## 2023-10-09 ENCOUNTER — CLINICAL SUPPORT (OUTPATIENT)
Dept: OBSTETRICS AND GYNECOLOGY | Facility: CLINIC | Age: 38
End: 2023-10-09
Payer: MEDICAID

## 2023-10-09 VITALS
SYSTOLIC BLOOD PRESSURE: 122 MMHG | BODY MASS INDEX: 30.95 KG/M2 | WEIGHT: 185.94 LBS | DIASTOLIC BLOOD PRESSURE: 78 MMHG

## 2023-10-09 DIAGNOSIS — Z23 NEED FOR TDAP VACCINATION: Primary | ICD-10-CM

## 2023-10-09 DIAGNOSIS — Z34.80 SUPERVISION OF OTHER NORMAL PREGNANCY, ANTEPARTUM: Primary | ICD-10-CM

## 2023-10-09 DIAGNOSIS — O34.219 HISTORY OF CESAREAN SECTION COMPLICATING PREGNANCY: ICD-10-CM

## 2023-10-09 PROCEDURE — 99999 PR PBB SHADOW E&M-EST. PATIENT-LVL III: CPT | Mod: PBBFAC,,, | Performed by: OBSTETRICS & GYNECOLOGY

## 2023-10-09 PROCEDURE — 99999PBSHW TDAP VACCINE GREATER THAN OR EQUAL TO 7YO IM: ICD-10-PCS | Mod: PBBFAC,,,

## 2023-10-09 PROCEDURE — 99213 OFFICE O/P EST LOW 20 MIN: CPT | Mod: TH,S$PBB,, | Performed by: OBSTETRICS & GYNECOLOGY

## 2023-10-09 PROCEDURE — 99213 PR OFFICE/OUTPT VISIT, EST, LEVL III, 20-29 MIN: ICD-10-PCS | Mod: TH,S$PBB,, | Performed by: OBSTETRICS & GYNECOLOGY

## 2023-10-09 PROCEDURE — 90715 TDAP VACCINE 7 YRS/> IM: CPT | Mod: PBBFAC

## 2023-10-09 PROCEDURE — 99999PBSHW TDAP VACCINE GREATER THAN OR EQUAL TO 7YO IM: Mod: PBBFAC,,,

## 2023-10-09 PROCEDURE — 99999 PR PBB SHADOW E&M-EST. PATIENT-LVL III: ICD-10-PCS | Mod: PBBFAC,,, | Performed by: OBSTETRICS & GYNECOLOGY

## 2023-10-09 PROCEDURE — 99213 OFFICE O/P EST LOW 20 MIN: CPT | Mod: PBBFAC,TH | Performed by: OBSTETRICS & GYNECOLOGY

## 2023-10-09 PROCEDURE — 90471 IMMUNIZATION ADMIN: CPT | Mod: PBBFAC

## 2023-10-09 NOTE — PROGRESS NOTES
Complaints today: Ms. Toussaint reports that she has been very busy. She just feels as if she needs a break. She is doing well physically. Normal fetal movements with no vaginal bleeding, LOF or contractions at this time.       /78   Wt 84.4 kg (185 lb 15.3 oz)   LMP 2023 (Exact Date)   BMI 30.95 kg/m²     37 y.o., at 33w0d by Estimated Date of Delivery: 23  Patient Active Problem List   Diagnosis    Pinguecula of left eye    History of ectopic pregnancy    Elevated blood-pressure reading without diagnosis of hypertension    Excessive and frequent menstruation    Graves disease    Goiter    Iron deficiency anemia    Migraine    Overweight with body mass index (BMI) 25.0-29.9    Vitamin D deficiency    Chronic pain of both shoulders    Amenorrhea    Positive pregnancy test    Supervision of other normal pregnancy, antepartum     OB History    Para Term  AB Living   4 1 1 0 2 1   SAB IAB Ectopic Multiple Live Births   1 0 1 0 1      # Outcome Date GA Lbr Misbah/2nd Weight Sex Delivery Anes PTL Lv   4 Current            3 SAB 2022 12w0d    SAB      2 Ectopic 21     ECTOPIC      1 Term 04/17/15 38w0d  2.92 kg (6 lb 7 oz) F CS-LTranv EPI N STEVEN      Complications: VSD (ventricular septal defect)       Dating reviewed    Allergies and problem list reviewed and updated    Medical and surgical history reviewed    Prenatal labs reviewed and updated    Physical Exam:  ABD: soft, gravid, nontender, 35cm    Assessment:  Doreen was seen today for routine prenatal visit.    Diagnoses and all orders for this visit:    Supervision of other normal pregnancy, antepartum  - Doing well  - Discussing ways to get rest and breaks    History of  section complicating pregnancy  - MFMU calculator done; 46.6 if arrest of descent vs 61% if not arrest of descent    No orders of the defined types were placed in this encounter.      Follow up in about 2 weeks (around 10/23/2023) for  Routine OB.

## 2023-10-17 ENCOUNTER — ROUTINE PRENATAL (OUTPATIENT)
Dept: OBSTETRICS AND GYNECOLOGY | Facility: CLINIC | Age: 38
End: 2023-10-17
Payer: MEDICAID

## 2023-10-17 VITALS
BODY MASS INDEX: 30.41 KG/M2 | WEIGHT: 182.75 LBS | SYSTOLIC BLOOD PRESSURE: 118 MMHG | DIASTOLIC BLOOD PRESSURE: 78 MMHG

## 2023-10-17 DIAGNOSIS — Z34.80 SUPERVISION OF OTHER NORMAL PREGNANCY, ANTEPARTUM: Primary | ICD-10-CM

## 2023-10-17 DIAGNOSIS — Z98.891 HISTORY OF CESAREAN DELIVERY: ICD-10-CM

## 2023-10-17 PROCEDURE — 99213 PR OFFICE/OUTPT VISIT, EST, LEVL III, 20-29 MIN: ICD-10-PCS | Mod: TH,S$PBB,, | Performed by: OBSTETRICS & GYNECOLOGY

## 2023-10-17 PROCEDURE — 99213 OFFICE O/P EST LOW 20 MIN: CPT | Mod: TH,S$PBB,, | Performed by: OBSTETRICS & GYNECOLOGY

## 2023-10-17 PROCEDURE — 99999 PR PBB SHADOW E&M-EST. PATIENT-LVL III: CPT | Mod: PBBFAC,,, | Performed by: OBSTETRICS & GYNECOLOGY

## 2023-10-17 PROCEDURE — 99213 OFFICE O/P EST LOW 20 MIN: CPT | Mod: PBBFAC,TH | Performed by: OBSTETRICS & GYNECOLOGY

## 2023-10-17 PROCEDURE — 99999 PR PBB SHADOW E&M-EST. PATIENT-LVL III: ICD-10-PCS | Mod: PBBFAC,,, | Performed by: OBSTETRICS & GYNECOLOGY

## 2023-10-17 NOTE — PROGRESS NOTES
Complaints today: Ms. Toussaint reports that she  is doing well with no complaints. Normal fetal movements with no vaginal bleeding, LOF or contractions at this time.   /78   Wt 82.9 kg (182 lb 12.2 oz)   LMP 2023 (Exact Date)   BMI 30.41 kg/m²     37 y.o., at 34w1d by Estimated Date of Delivery: 23  Patient Active Problem List   Diagnosis    Pinguecula of left eye    History of ectopic pregnancy    Elevated blood-pressure reading without diagnosis of hypertension    Excessive and frequent menstruation    Graves disease    Goiter    Iron deficiency anemia    Migraine    Overweight with body mass index (BMI) 25.0-29.9    Vitamin D deficiency    Chronic pain of both shoulders    Amenorrhea    Positive pregnancy test    Supervision of other normal pregnancy, antepartum     OB History    Para Term  AB Living   4 1 1 0 2 1   SAB IAB Ectopic Multiple Live Births   1 0 1 0 1      # Outcome Date GA Lbr Misbah/2nd Weight Sex Delivery Anes PTL Lv   4 Current            3 SAB 2022 12w0d    SAB      2 Ectopic 21     ECTOPIC      1 Term 04/17/15 38w0d  2.92 kg (6 lb 7 oz) F CS-LTranv EPI N STEVEN      Complications: VSD (ventricular septal defect)       Dating reviewed    Allergies and problem list reviewed and updated    Medical and surgical history reviewed    Prenatal labs reviewed and updated    Physical Exam:  ABD: soft, gravid, nontender, 36cm    Assessment:  Doreen was seen today for routine prenatal visit.    Diagnoses and all orders for this visit:    Supervision of other normal pregnancy, antepartum  - Encouraged her to have 3hr gtt done   History of  delivery  - Desired  with no epidural  - Discussed that favorable cervix, AROM/IUPC are required for TOLAC  - Also discussed if emergent procedure is required then general anesthesia required      No orders of the defined types were placed in this encounter.      Follow up in about 2 weeks (around 10/31/2023)  for Routine OB.

## 2023-10-18 ENCOUNTER — PATIENT MESSAGE (OUTPATIENT)
Dept: OBSTETRICS AND GYNECOLOGY | Facility: CLINIC | Age: 38
End: 2023-10-18
Payer: MEDICAID

## 2023-10-23 ENCOUNTER — PATIENT MESSAGE (OUTPATIENT)
Dept: OTHER | Facility: OTHER | Age: 38
End: 2023-10-23
Payer: MEDICAID

## 2023-10-23 ENCOUNTER — TELEPHONE (OUTPATIENT)
Dept: OBSTETRICS AND GYNECOLOGY | Facility: CLINIC | Age: 38
End: 2023-10-23
Payer: MEDICAID

## 2023-10-23 NOTE — TELEPHONE ENCOUNTER
----- Message from Flaquita Ramachandran sent at 10/23/2023  9:38 AM CDT -----  Regarding: [id number]  Type: Patient Call Back    Who called:self     What is the request in detail: pt states she will like a callback from the nurse in regards to setting up an appt regarding her test results     Can the clinic reply by [facility]?no     Would the patient rather a call back or a response via [facility]? Call back     Best call back number:[phone]       Additional Information:

## 2023-10-24 ENCOUNTER — ROUTINE PRENATAL (OUTPATIENT)
Dept: OBSTETRICS AND GYNECOLOGY | Facility: CLINIC | Age: 38
End: 2023-10-24
Payer: MEDICAID

## 2023-10-24 VITALS
SYSTOLIC BLOOD PRESSURE: 120 MMHG | WEIGHT: 182.63 LBS | DIASTOLIC BLOOD PRESSURE: 70 MMHG | BODY MASS INDEX: 30.39 KG/M2

## 2023-10-24 DIAGNOSIS — Z34.80 SUPERVISION OF OTHER NORMAL PREGNANCY, ANTEPARTUM: Primary | ICD-10-CM

## 2023-10-24 DIAGNOSIS — O24.410 DIET CONTROLLED GESTATIONAL DIABETES MELLITUS (GDM) IN THIRD TRIMESTER: ICD-10-CM

## 2023-10-24 PROCEDURE — 99999 PR PBB SHADOW E&M-EST. PATIENT-LVL III: ICD-10-PCS | Mod: PBBFAC,,, | Performed by: OBSTETRICS & GYNECOLOGY

## 2023-10-24 PROCEDURE — 99213 PR OFFICE/OUTPT VISIT, EST, LEVL III, 20-29 MIN: ICD-10-PCS | Mod: TH,S$PBB,, | Performed by: OBSTETRICS & GYNECOLOGY

## 2023-10-24 PROCEDURE — 99213 OFFICE O/P EST LOW 20 MIN: CPT | Mod: PBBFAC,TH | Performed by: OBSTETRICS & GYNECOLOGY

## 2023-10-24 PROCEDURE — 99213 OFFICE O/P EST LOW 20 MIN: CPT | Mod: TH,S$PBB,, | Performed by: OBSTETRICS & GYNECOLOGY

## 2023-10-24 PROCEDURE — 99999 PR PBB SHADOW E&M-EST. PATIENT-LVL III: CPT | Mod: PBBFAC,,, | Performed by: OBSTETRICS & GYNECOLOGY

## 2023-10-24 NOTE — PROGRESS NOTES
Complaints today: Ms. Toussaint reports that she barely eats more than 1-2x a day. She is otherwise doing well with no complaints. She admits that she a bit stressed about the newly diagnosed GDM. She reports that she would like to still deliver vaginally if possible.     Normal fetal movements with no vaginal bleeding, LOF or contractions at this time.     /70   Wt 82.9 kg (182 lb 10.4 oz)   LMP 2023 (Exact Date)   BMI 30.39 kg/m²     37 y.o., at 35w1d by Estimated Date of Delivery: 23  Patient Active Problem List   Diagnosis    Pinguecula of left eye    History of ectopic pregnancy    Elevated blood-pressure reading without diagnosis of hypertension    Excessive and frequent menstruation    Graves disease    Goiter    Iron deficiency anemia    Migraine    Overweight with body mass index (BMI) 25.0-29.9    Vitamin D deficiency    Chronic pain of both shoulders    Amenorrhea    Positive pregnancy test    Supervision of other normal pregnancy, antepartum     OB History    Para Term  AB Living   4 1 1 0 2 1   SAB IAB Ectopic Multiple Live Births   1 0 1 0 1      # Outcome Date GA Lbr Misbah/2nd Weight Sex Delivery Anes PTL Lv   4 Current            3 SAB 2022 12w0d    SAB      2 Ectopic 21     ECTOPIC      1 Term 04/17/15 38w0d  2.92 kg (6 lb 7 oz) F CS-LTranv EPI N STEVEN      Complications: VSD (ventricular septal defect)       Dating reviewed    Allergies and problem list reviewed and updated    Medical and surgical history reviewed    Prenatal labs reviewed and updated    Physical Exam:  ABD: soft, gravid, nontender, 36cm    Assessment:  Doreen was seen today for routine prenatal visit.    Diagnoses and all orders for this visit:    Supervision of other normal pregnancy, antepartum    Diet controlled gestational diabetes mellitus (GDM) in third trimester  -     Ambulatory referral/consult to Diabetes Education; Future  -     Ambulatory referral/consult to  Perinatology; Future  -     Prenatal Testing - NST/ANKIT; Standing        Orders Placed This Encounter   Procedures    Ambulatory referral/consult to Diabetes Education    Ambulatory referral/consult to Perinatology    Prenatal Testing - NST/ANKIT       Follow up in about 1 week (around 10/31/2023) for Routine OB.

## 2023-10-30 ENCOUNTER — CLINICAL SUPPORT (OUTPATIENT)
Dept: DIABETES | Facility: CLINIC | Age: 38
End: 2023-10-30
Payer: MEDICAID

## 2023-10-30 ENCOUNTER — ROUTINE PRENATAL (OUTPATIENT)
Dept: OBSTETRICS AND GYNECOLOGY | Facility: CLINIC | Age: 38
End: 2023-10-30
Payer: MEDICAID

## 2023-10-30 VITALS — DIASTOLIC BLOOD PRESSURE: 80 MMHG | BODY MASS INDEX: 30.6 KG/M2 | SYSTOLIC BLOOD PRESSURE: 124 MMHG | WEIGHT: 183.88 LBS

## 2023-10-30 VITALS — BODY MASS INDEX: 30.69 KG/M2 | WEIGHT: 184.19 LBS | HEIGHT: 65 IN

## 2023-10-30 DIAGNOSIS — O24.410 DIET CONTROLLED GESTATIONAL DIABETES MELLITUS (GDM) IN THIRD TRIMESTER: ICD-10-CM

## 2023-10-30 DIAGNOSIS — Z36.89 ENCOUNTER FOR ULTRASOUND TO ASSESS FETAL GROWTH: Primary | ICD-10-CM

## 2023-10-30 DIAGNOSIS — Z34.80 SUPERVISION OF OTHER NORMAL PREGNANCY, ANTEPARTUM: Primary | ICD-10-CM

## 2023-10-30 PROCEDURE — 99999PBSHW PR PBB SHADOW TECHNICAL ONLY FILED TO HB: ICD-10-PCS | Mod: PBBFAC,,,

## 2023-10-30 PROCEDURE — 99213 OFFICE O/P EST LOW 20 MIN: CPT | Mod: TH,S$PBB,, | Performed by: OBSTETRICS & GYNECOLOGY

## 2023-10-30 PROCEDURE — 87491 CHLMYD TRACH DNA AMP PROBE: CPT | Performed by: OBSTETRICS & GYNECOLOGY

## 2023-10-30 PROCEDURE — 99999PBSHW PR PBB SHADOW TECHNICAL ONLY FILED TO HB: Mod: PBBFAC,,,

## 2023-10-30 PROCEDURE — 99213 OFFICE O/P EST LOW 20 MIN: CPT | Mod: PBBFAC,TH | Performed by: OBSTETRICS & GYNECOLOGY

## 2023-10-30 PROCEDURE — G0108 DIAB MANAGE TRN  PER INDIV: HCPCS | Mod: PBBFAC

## 2023-10-30 PROCEDURE — 99213 PR OFFICE/OUTPT VISIT, EST, LEVL III, 20-29 MIN: ICD-10-PCS | Mod: TH,S$PBB,, | Performed by: OBSTETRICS & GYNECOLOGY

## 2023-10-30 PROCEDURE — 99999 PR PBB SHADOW E&M-EST. PATIENT-LVL III: ICD-10-PCS | Mod: PBBFAC,,, | Performed by: OBSTETRICS & GYNECOLOGY

## 2023-10-30 PROCEDURE — 87081 CULTURE SCREEN ONLY: CPT | Performed by: OBSTETRICS & GYNECOLOGY

## 2023-10-30 PROCEDURE — 99999 PR PBB SHADOW E&M-EST. PATIENT-LVL III: CPT | Mod: PBBFAC,,, | Performed by: OBSTETRICS & GYNECOLOGY

## 2023-10-30 PROCEDURE — 99212 OFFICE O/P EST SF 10 MIN: CPT | Mod: PBBFAC,27

## 2023-10-30 PROCEDURE — 99999 PR PBB SHADOW E&M-EST. PATIENT-LVL II: ICD-10-PCS | Mod: PBBFAC,,,

## 2023-10-30 PROCEDURE — 99999 PR PBB SHADOW E&M-EST. PATIENT-LVL II: CPT | Mod: PBBFAC,,,

## 2023-10-30 NOTE — PROGRESS NOTES
Complaints today:* Ms. Soriano reports that she has been controlling her snacking. Her highest glucose levels is during lunch because she often eats on the go during that time. She reports that she is doing well.     Normal fetal movements with no vaginal bleeding, LOF or contractions at this time.       /80   Wt 83.4 kg (183 lb 13.8 oz)   LMP 2023 (Exact Date)   BMI 30.60 kg/m²     37 y.o., at 36w0d by Estimated Date of Delivery: 23  Patient Active Problem List   Diagnosis    Pinguecula of left eye    History of ectopic pregnancy    Elevated blood-pressure reading without diagnosis of hypertension    Excessive and frequent menstruation    Graves disease    Goiter    Iron deficiency anemia    Migraine    Overweight with body mass index (BMI) 25.0-29.9    Vitamin D deficiency    Chronic pain of both shoulders    Amenorrhea    Positive pregnancy test    Supervision of other normal pregnancy, antepartum     OB History    Para Term  AB Living   4 1 1 0 2 1   SAB IAB Ectopic Multiple Live Births   1 0 1 0 1      # Outcome Date GA Lbr Misbah/2nd Weight Sex Delivery Anes PTL Lv   4 Current            3 SAB 2022 12w0d    SAB      2 Ectopic 21     ECTOPIC      1 Term 04/17/15 38w0d  2.92 kg (6 lb 7 oz) F CS-LTranv EPI N STEVEN      Complications: VSD (ventricular septal defect)       Dating reviewed    Allergies and problem list reviewed and updated    Medical and surgical history reviewed    Prenatal labs reviewed and updated    Physical Exam:  ABD: soft, gravid, nontender, 36 cm    Assessment:  Doreen was seen today for routine prenatal visit.    Diagnoses and all orders for this visit:    Supervision of other normal pregnancy, antepartum  -     STREP B SCREEN, VAGINAL / RECTAL  -     C. trachomatis/N. gonorrhoeae by AMP [facility]; Vagina  - HIV, CBC and RPR ordered as 3rd trimester labs    Diet controlled gestational diabetes mellitus (GDM) in third  trimester  - Dietician appt today  - Will follow up MFM referral      Orders Placed This Encounter   Procedures    STREP B SCREEN, VAGINAL / RECTAL    C. trachomatis/N. gonorrhoeae by AMP [facility]; Vagina       Follow up in about 1 week (around 11/6/2023) for Routine OB.

## 2023-10-31 LAB
C TRACH DNA SPEC QL NAA+PROBE: NOT DETECTED
N GONORRHOEA DNA SPEC QL NAA+PROBE: NOT DETECTED

## 2023-10-31 NOTE — PROGRESS NOTES
"Diabetes Care Specialist Progress Note  Author: Elke Jones RN  Date: 10/30/2023        Program Intake  Reason for Diabetes Program Visit:: Initial Diabetes Assessment  Current diabetes risk level:: low  In the last 12 months, have you:: used emergency room services  Was the ER or hospital admission related to diabetes?: No  Permission to speak with others about care:: yes    Lab Results   Component Value Date    HGBA1C 5.1 03/24/2023       Clinical  Weight: 83.6 kg (184 lb 3.2 oz)   Height: 5' 5" (165.1 cm)   Body mass index is 30.65 kg/m².    Clinical Assessment  Current Diabetes Treatment: Diet, Exercise  Have you ever experienced hypoglycemia (low blood sugar)?: no  Have you ever experienced hyperglycemia (high blood sugar)?: no    Medication Information  How do you obtain your medications?: Patient drives  How many days a week do you miss your medications?: Never  Do you sometimes have difficulty refilling your medications?: No    Labs  Do you have regular lab work to monitor your medications?: Yes  Type of Regular Lab Work: A1c  Where do you get your labs drawn?: [facility]  Lab Compliance Barriers: No    Nutritional Status  Diet: Regular  Meal Plan 24 Hour Recall: Breakfast, Lunch, Dinner  Meal Plan 24 Hour Recall - Breakfast: fruit cup and peanuts  Meal Plan 24 Hour Recall - Lunch: chick-billy-A sandwich or salad w/chicken nuggets and a lemonade drink  Meal Plan 24 Hour Recall - Dinner: roast with gravy and rice and potatoe salad  or spinach w/rice, mac/cheese  Change in appetite?: No  Current nutritional status an area of need that is impacting patient's ability to self-manage diabetes?: No    Additional Social History  Support  Does anyone support you with your diabetes care?: yes  Who supports you?: self  Who takes you to your medical appointments?: self  Does the current support meet the patient's needs?: Yes  Is Support an area impacting ability to self-manage diabetes?: No    Access to [employer] " Technology  Does the patient have access to any of the following devices or technologies?: Smart phone  Media or technology needs impacting ability to self-manage diabetes?: No    Cognitive/Behavioral Health  Alert and Oriented: Yes  Difficulty Thinking: No  Requires Prompting: No  Requires assistance for routine expression?: No  Cognitive or behavioral barriers impacting ability to self-manage diabetes?: No    Culture/Restoration  Culture or Gnosticism beliefs that may impact ability to access healthcare: No    Communication  Language preference: English  Hearing Problems: No  Vision Problems: Yes  Vision problem type:: Decreased Vision  Vision Assistance: Glasses  Communication needs impacting ability to self-manage diabetes?: No    Health Literacy  Preferred Learning Method: Face to Face, Reading Materials  How often do you need to have someone help you read instructions, pamphlets, or written material from your doctor or pharmacy?: Never  Health literacy needs impacting ability to self-manage diabetes?: No      Diabetes Self-Management Skills Assessment  Diabetes Disease Process/Treatment Options  Patient/caregiver able to state what happens when someone has diabetes.: no  Patient/caregiver knows what type of diabetes they have.: no  Patient/caregiver able to identify at least three signs and symptoms of diabetes.: no  Patient able to identify at least three risk factors for diabetes.: no  Diabetes Disease Process/Treatment Options: Skills Assessment Completed: Yes  Assessment indicates:: Instruction Needed, Knowledge deficit  Area of need?: Yes    Nutrition/Healthy Eating  Challenges to healthy eating:: portion control  Method of carbohydrate measurement:: no method  Patient can identify foods that impact blood sugar.: yes  Patient-identified foods:: fruit/fruit juice, soda, starches (bread, pasta, rice, cereal)  Nutrition/Healthy Eating Skills Assessment Completed:: Yes  Assessment indicates:: Instruction Needed,  "Knowledge deficit  Area of need?: Yes    Physical Activity/Exercise  Patient's daily activity level:: lightly active  Patient formally exercises outside of work.: yes  Exercise Type: walking  Intensity: Low  Frequency: four or more times a week  Patient can identify forms of physical activity.: yes  Stated forms of physical activity:: any movement performed by muscles that uses energy  Patient can identify reasons why exercise/physical activity is important in diabetes management.: no  Physical Activity/Exercise Skills Assessment Completed: : Yes  Assessment indicates:: Instruction Needed, Knowledge deficit  Area of need?: Yes    Medications  Patient is able to describe current diabetes management routine.: yes  Diabetes management routine:: diet, exercise  Patient is able to identify current diabetes medications, dosages, and appropriate timing of medications.:  (Pt is not on any diabetes medication.)  Patient understands the purpose of the medications taken for diabetes.:  (N/A)  Patient reports problems or concerns with current medication regimen.:  (N/A)  Medication Skills Assessment Completed:: Yes (N/A)  Assessment indicates:: Instruction Needed  Area of need?: No    Home Blood Glucose Monitoring  Patient states that blood sugar is checked at home daily.: yes  Monitoring Method:: home glucometer  Home glucometer meter type:: Insurance Preferred Meter  How often do you check your blood sugar?: 4 times a day  When do you check your blood sugar?: Before breakfast, Before lunch, Before dinner, 2 hours after meal  When you check what is your typical blood sugar range? : See  and/or "Notes" section  Blood glucose logs:: encouraged to keep logs, yes, encouraged to bring logs to provider visits  Blood glucose logs reviewed today?: yes  Home Blood Glucose Monitoring Skills Assessment Completed: : Yes  Assessment indicates:: Knowledge deficit, Instruction Needed  Area of need?: Yes    Acute " Complications  Patient is able to identify types of acute complications: No  Acute Complications Skills Assessment Completed: : Yes  Assessment indicates:: Instruction Needed, Knowledge deficit  Area of need?: Yes    Chronic Complications  Chronic Complications Skills Assessment Completed: : No  Deferred due to:: Time    Psychosocial/Coping  Psychosocial/Coping Skills Assessment Completed: : No  Deffered due to:: Time      Assessment Summary and Plan    Based on today's diabetes care assessment, the following areas of need were identified:          10/30/2023    12:02 AM   Social   Support No   Access to Mass Media/Tech No   Cognitive/Behavioral Health No   Culture/Restorationism No   Communication No   Health Literacy No            10/30/2023    12:02 AM   Clinical   Lab Compliance No   Nutritional Status No            10/30/2023    12:02 AM   Diabetes Self-Management Skills   Diabetes Disease Process/Treatment Options Yes- Provided GDM management guide and discussed risk factors of gestational diabetes. Also, instructed patient on what is gestational diabetes and discussed diagnosing criteria and A1c tests.   Nutrition/Healthy Eating Yes- see care planning   Physical Activity/Exercise Yes- - Discussed benefits of exercise as it relates to insulin resistance.   Medication No   Home Blood Glucose Monitoring Yes- Discussed BS goals and importance of monitoring and recording BS for review.   Acute Complications Yes- Reviewed prevention, detection, signs and symptoms, and treatment of hypoglycemia following rule of 15.      Today's interventions were provided through individual discussion, instruction, and written materials were provided.      Patient verbalized understanding of instruction and written materials.  Pt was able to return back demonstration of instructions today. Patient understood key points, needs reinforcement and further instruction.     Diabetes Self-Management Care Plan:    Today's Diabetes  Self-Management Care Plan was developed with Doreen's input. Doreen has agreed to work toward the following goal(s) to improve his/her overall diabetes control.      Care Plan: Diabetes Management   Updates made since 10/1/2023 12:00 AM        Problem: Healthy Eating         Goal: Eat 2-3 meals daily with 30-45g/2-3 servings of Carbohydrates for breakfast and 45-60g/3-4 servings for lunch and dinner. Limit snacking in between meals to 1-2 servings (15-30 grams).    Start Date: 10/30/2023   Priority: High   Barriers: No Barriers Identified   Note:    10/30/23 - - We concentrated on portion sizes at today's session. Overall meal planning and various choices for meals. Discussed carb sources of foods, appropriate amount of carbs to have at meals/snacks and acceptable serving sizes of individual carb items. Obtained a 24-hr food recall from patient. Discussed various meal plan options to promote healthy eating.    - - Practiced reading food labels on various food items with patient focusing on serving size and total carbohydrate intake (not sugar intake). Instructed on appropriate serving sizes of specific carb containing foods. Discussed having lean protein at all meals and adding non starchy vegetables at lunch and dinner. Written education information provided to patient for use at home. Pt verbalized understanding of all the above.       Task: Provided visual examples using dry measuring cups, food models, and other familiar objects such as computer mouse, deck or cards, tennis ball etc. to help with visualization of portions. Completed 10/30/2023        Task: Explained how to count carbohydrates using the food label and the use of dry measuring cups for accurate carb counting. Completed 10/30/2023     Task: Recommended replacing beverages containing high sugar content with noncaloric/sugar free options and/or water. Completed 10/30/2023        Task: Review the importance of balancing carbohydrates with each meal  using portion control techniques to count servings of carbohydrate and label reading to identify serving size and amount of total carbs per serving. Completed 10/30/2023        Task: Provided Sample plate method and reviewed the use of the plate to estimate amounts of carbohydrate per meal. Completed 10/30/2023          Follow Up Plan     No follow-ups on file.    Today's care plan and follow up schedule was discussed with patient.  Doreen verbalized understanding of the care plan, goals, and agrees to follow up plan.        The patient was encouraged to communicate with his/her health care provider/physician and care team regarding his/her condition(s) and treatment.  I provided the patient with my contact information today and encouraged to contact me via phone or [facility] Patient Portal as needed.     Length of Visit   Total Time: 60 Minutes

## 2023-11-01 LAB — BACTERIA SPEC AEROBE CULT: NORMAL

## 2023-11-03 ENCOUNTER — PROCEDURE VISIT (OUTPATIENT)
Dept: MATERNAL FETAL MEDICINE | Facility: CLINIC | Age: 38
End: 2023-11-03
Payer: MEDICAID

## 2023-11-03 ENCOUNTER — OFFICE VISIT (OUTPATIENT)
Dept: MATERNAL FETAL MEDICINE | Facility: CLINIC | Age: 38
End: 2023-11-03
Payer: MEDICAID

## 2023-11-03 VITALS
BODY MASS INDEX: 30.08 KG/M2 | DIASTOLIC BLOOD PRESSURE: 86 MMHG | SYSTOLIC BLOOD PRESSURE: 114 MMHG | WEIGHT: 180.75 LBS

## 2023-11-03 DIAGNOSIS — Z36.89 ENCOUNTER FOR ULTRASOUND TO ASSESS FETAL GROWTH: ICD-10-CM

## 2023-11-03 DIAGNOSIS — O09.299 CURRENT SINGLETON PREGNANCY WITH HISTORY OF CONGENITAL HEART DISEASE IN PRIOR CHILD, ANTEPARTUM: ICD-10-CM

## 2023-11-03 DIAGNOSIS — O24.410 DIET CONTROLLED GESTATIONAL DIABETES MELLITUS (GDM) IN THIRD TRIMESTER: ICD-10-CM

## 2023-11-03 DIAGNOSIS — O34.219 HISTORY OF CESAREAN DELIVERY, CURRENTLY PREGNANT: ICD-10-CM

## 2023-11-03 DIAGNOSIS — E05.00 GRAVES DISEASE: ICD-10-CM

## 2023-11-03 PROCEDURE — 99213 OFFICE O/P EST LOW 20 MIN: CPT | Mod: PBBFAC,TH | Performed by: OBSTETRICS & GYNECOLOGY

## 2023-11-03 PROCEDURE — 76816 OB US FOLLOW-UP PER FETUS: CPT | Mod: 26,S$PBB,, | Performed by: OBSTETRICS & GYNECOLOGY

## 2023-11-03 PROCEDURE — 1159F MED LIST DOCD IN RCRD: CPT | Mod: CPTII,,, | Performed by: OBSTETRICS & GYNECOLOGY

## 2023-11-03 PROCEDURE — 3074F SYST BP LT 130 MM HG: CPT | Mod: CPTII,,, | Performed by: OBSTETRICS & GYNECOLOGY

## 2023-11-03 PROCEDURE — 99203 OFFICE O/P NEW LOW 30 MIN: CPT | Mod: S$PBB,TH,, | Performed by: OBSTETRICS & GYNECOLOGY

## 2023-11-03 PROCEDURE — 3044F PR MOST RECENT HEMOGLOBIN A1C LEVEL <7.0%: ICD-10-PCS | Mod: CPTII,,, | Performed by: OBSTETRICS & GYNECOLOGY

## 2023-11-03 PROCEDURE — 76816 OB US FOLLOW-UP PER FETUS: CPT | Mod: PBBFAC | Performed by: OBSTETRICS & GYNECOLOGY

## 2023-11-03 PROCEDURE — 3074F PR MOST RECENT SYSTOLIC BLOOD PRESSURE < 130 MM HG: ICD-10-PCS | Mod: CPTII,,, | Performed by: OBSTETRICS & GYNECOLOGY

## 2023-11-03 PROCEDURE — 3008F PR BODY MASS INDEX (BMI) DOCUMENTED: ICD-10-PCS | Mod: CPTII,,, | Performed by: OBSTETRICS & GYNECOLOGY

## 2023-11-03 PROCEDURE — 99203 PR OFFICE/OUTPT VISIT, NEW, LEVL III, 30-44 MIN: ICD-10-PCS | Mod: S$PBB,TH,, | Performed by: OBSTETRICS & GYNECOLOGY

## 2023-11-03 PROCEDURE — 3079F DIAST BP 80-89 MM HG: CPT | Mod: CPTII,,, | Performed by: OBSTETRICS & GYNECOLOGY

## 2023-11-03 PROCEDURE — 3044F HG A1C LEVEL LT 7.0%: CPT | Mod: CPTII,,, | Performed by: OBSTETRICS & GYNECOLOGY

## 2023-11-03 PROCEDURE — 3079F PR MOST RECENT DIASTOLIC BLOOD PRESSURE 80-89 MM HG: ICD-10-PCS | Mod: CPTII,,, | Performed by: OBSTETRICS & GYNECOLOGY

## 2023-11-03 PROCEDURE — 76816 PR  US,PREGNANT UTERUS,F/U,TRANSABD APP: ICD-10-PCS | Mod: 26,S$PBB,, | Performed by: OBSTETRICS & GYNECOLOGY

## 2023-11-03 PROCEDURE — 99999 PR PBB SHADOW E&M-EST. PATIENT-LVL III: CPT | Mod: PBBFAC,,, | Performed by: OBSTETRICS & GYNECOLOGY

## 2023-11-03 PROCEDURE — 3008F BODY MASS INDEX DOCD: CPT | Mod: CPTII,,, | Performed by: OBSTETRICS & GYNECOLOGY

## 2023-11-03 PROCEDURE — 1159F PR MEDICATION LIST DOCUMENTED IN MEDICAL RECORD: ICD-10-PCS | Mod: CPTII,,, | Performed by: OBSTETRICS & GYNECOLOGY

## 2023-11-03 PROCEDURE — 99999 PR PBB SHADOW E&M-EST. PATIENT-LVL III: ICD-10-PCS | Mod: PBBFAC,,, | Performed by: OBSTETRICS & GYNECOLOGY

## 2023-11-03 NOTE — ASSESSMENT & PLAN NOTE
We were not consulted for this problem.  Patient is on no medications at this time and denies any symptoms.  Management per primary OB provider

## 2023-11-03 NOTE — PROGRESS NOTES
MATERNAL-FETAL MEDICINE   CONSULT NOTE    Provider requesting consultation: Aleksander    SUBJECTIVE:     Ms. Amber Toussaint is a 37 y.o.  female with IUP at 36w4d who is seen in consultation by MFM for evaluation and management of:  Problem   Diet Controlled Gestational Diabetes Mellitus (Gdm) in Third Trimester   History of  Delivery, Currently Pregnant   Current Watters Pregnancy With History of Congenital Heart Disease in Prior Child, Antepartum   Graves Disease     Patient has no complaints today. She is overall feeling well.  Patient denies any contractions/cramping, vaginal bleeding or leakage of fluid.  She reports good fetal movement.       Medication List with Changes/Refills   Current Medications    ASPIRIN (ECOTRIN) 81 MG EC TABLET    Take 1 tablet (81 mg total) by mouth once daily.    ERGOCALCIFEROL (ERGOCALCIFEROL) 50,000 UNIT CAP    Take 50,000 Units by mouth once a week.    FERROUS SULFATE 325 (65 FE) MG EC TABLET    Take 1 tablet (325 mg total) by mouth once daily.    PRENATAL 105-IRON-FOLIC AC-DHA 30 MG IRON- 1.4 MG-300 MG CMPK    Take by mouth.     Review of patient's allergies indicates:   Allergen Reactions    Vinyl ether Hives     PMH:  Past Medical History:   Diagnosis Date    Graves disease     Iron deficiency anemia 2021     PObHx:  OB History    Para Term  AB Living   4 1 1 0 2 1   SAB IAB Ectopic Multiple Live Births   1 0 1 0 1      # Outcome Date GA Lbr Misbah/2nd Weight Sex Delivery Anes PTL Lv   4 Current            3 SAB 2022 12w0d    SAB      2 Ectopic 21     ECTOPIC      1 Term 04/17/15 38w0d  2.92 kg (6 lb 7 oz) F CS-LTranv EPI N STEVEN      Complications: VSD (ventricular septal defect)     PSH:  Past Surgical History:   Procedure Laterality Date    BREAST SURGERY      Cysts removed     SECTION      DIAGNOSTIC LAPAROSCOPY N/A 2021    Procedure: LAPAROSCOPY, DIAGNOSTIC, possible salpingostomy/ salpingectomy/  D& C;   Surgeon: Cheri Hunter MD;  Location: [facility];  Service: OB/GYN;  Laterality: N/A;    DILATION AND CURETTAGE OF UTERUS  2021    Procedure: DILATION AND CURETTAGE, UTERUS;  Surgeon: Cheri Hunter MD;  Location: [facility];  Service: OB/GYN;;    fibroid removal  ,     breast    HEMANGIOMA EXCISION      right thigh    LAPAROSCOPIC SALPINGECTOMY Right 2021    Procedure: SALPINGECTOMY, LAPAROSCOPIC;  Surgeon: Cheri Hunter MD;  Location: [facility];  Service: OB/GYN;  Laterality: Right;     Family history:family history includes Breast cancer in her maternal grandmother; Colon cancer in her paternal grandfather; Hypertension in her father, maternal grandmother, and mother; Kidney failure in her mother; Pacemaker/defibrilator in her mother; Throat cancer in her father.    Social history: reports that she has never smoked. She has never used smokeless tobacco. She reports that she does not currently use alcohol. She reports that she does not use drugs.    Genetic history: The patient denies any inherited genetic diseases or birth defects in herself or her partner's personal history or family.    Objective:   /86 (BP Location: Left arm, Patient Position: Sitting, BP Method: Medium (Manual))   Wt 82 kg (180 lb 12.4 oz)   LMP 2023 (Exact Date)   BMI 30.08 kg/m²     Physical Exam  Deferred    Ultrasound performed. See viewpoint for full ultrasound report.  Watters live IUP  Fetal size is appropriate for gestational age, with the EFW (2777 g) plotting at the 27% and the AC plotting at the 22%.   A limited repeat fetal anatomic survey appears normal.   The MVP is normal.     Significant labs/imaginhr 148  3hr - 81/183/204/131    ASSESSMENT/PLAN:     37 y.o.  female with IUP at 36w4d     Diet controlled gestational diabetes mellitus (GDM) in third trimester  I counseled the patient regarding the risks of gestational diabetes, which include macrosomia, hypertensive disorders  of pregnancy,  hypoglycemia, shoulder dystocia/birth trauma, polyhydramnios, and increased risk of  delivery.  Patients requiring medical therapy have an increased risk of stillbirth.  Discussed that  outcome is linked to her ability to achieve glycemic control.  The goal of treatment is to have a fasting blood sugar between 70 and 95 mg/dL and 2 hour postprandials less than 120 mg/dL.  Antepartum testing is indicated for those patients requiring medical therapy to reduce the incidence of fetal demise. We discussed dietary counseling and appropriate exercise to improve peripheral insulin resistance. We discussed the frequency of blood sugar monitoring and goal blood sugars. She has met with a diabetic educator this pregnancy.   Most women with GDM are cured by delivery. All medications can be stopped postpartum. However, some women with GDM have undiagnosed type 2 diabetes. Therefore, I recommend obtaining a postpartum fasting blood sugar prior to discharge. Approximately 20% of women with GDM will have glucose intolerance or type 2 DM at the postpartum visit. A 2 hour glucose tolerance test should be performed 6-8 weeks postpartum. If the patient is breastfeeding, it is reasonable to delay this as appropriate. Additionally, women with GDM are at increased risk of developing type 2 DM later in life. Therefore, establishing with a primary MD who can perform annual diabetes screening is appropriate.   Given control she has demonstrated, will continue with diet control alone.     Recommendations:  We reinforced checking 4 x/day and reinforced goal blood sugars  Given control, will defer further BS monitoring to her primary OB  Regimen: Diet  Antepartum testing should be started at 40 weeks for women who do NOT require medications.  Check fasting blood sugar in hospital postpartum.   If normal, discontinue therapy and monitoring and recommend repeat postpartum glucose testing in 6-8 weeks  postpartum using a 75 g oral glucose tolerance test.   If fasting blood glucose is between 100-125 mg/dl or impaired glucose tolerance is noted on 2 hour glucose test, refer to primary care as appropriate.   An ultrasound for estimated fetal weight should be obtained within 3 weeks of anticipated delivery; if the EFW is >= 4500 grams, a  should be offered.    Delivery timing:  Well controlled with diet: 39 0/7 - 40 6/7 weeks gestation (CURRENTLY)  Oral agent or insulin required: 39 0/7 - 39 6/ 7 weeks gestation  Poorly controlled on oral agent or insulin: 38 0/7 - 38 6/7 weeks gestation      Graves disease  We were not consulted for this problem.  Patient is on no medications at this time and denies any symptoms.  Management per primary OB provider      History of  delivery, currently pregnant  Management per primary OB provider      Current boyce pregnancy with history of congenital heart disease in prior child, antepartum  Patient reports her prior child was born with a VSD that required a large surgery. She is currently followed by Dr. Hewitt at [facility] for WPW and cardiac check ups.  No ECHO done this pregnancy. Targeted scan was reassuring.   Given the risk of CHD, would recommend primary OB alert pediatrics of this history and evaluate the baby to determine if further workup is needed after birth.  Due to late GA, prenatal ECHO would not be available.      Patient was counseled that prenatal ultrasound studies have limitations. They do not detect all fetal, genetic, placental, and maternal abnormalities. A normal appearing prenatal ultrasound is reassuring. However, it does not guarantee the absence of an abnormality or predict a normal outcome for the fetus or the mother.     Patient's other comorbidites were not addressed in today's visit.  If primary OB provider would like MFM input on these, please feel free to reconsult as clinically indicated.  Otherwise, will defer management to  primary OB provider      FOLLOW UP: No further ultrasounds or visits were scheduled.       The patient was given an opportunity to ask questions about the management of her high risk pregnancy problems. She expressed an understanding of and agreement to the above impression and plan. All questions were answered to her satisfaction.    32 minutes of total time spent on the encounter, which includes face to face time and non-face to face time preparing to see the patient (eg, review of tests), obtaining and/or reviewing separately obtained history, documenting clinical information in the electronic or other health record, independently interpreting results (not separately reported) and communicating results to the patient/family/caregiver, or care coordination (not separately reported).        Bro De Luna MD   Maternal-Fetal Medicine      Electronically Signed by Bro De Luna November 3, 2023

## 2023-11-03 NOTE — ASSESSMENT & PLAN NOTE
I counseled the patient regarding the risks of gestational diabetes, which include macrosomia, hypertensive disorders of pregnancy,  hypoglycemia, shoulder dystocia/birth trauma, polyhydramnios, and increased risk of  delivery.  Patients requiring medical therapy have an increased risk of stillbirth.  Discussed that  outcome is linked to her ability to achieve glycemic control.  The goal of treatment is to have a fasting blood sugar between 70 and 95 mg/dL and 2 hour postprandials less than 120 mg/dL.  Antepartum testing is indicated for those patients requiring medical therapy to reduce the incidence of fetal demise. We discussed dietary counseling and appropriate exercise to improve peripheral insulin resistance. We discussed the frequency of blood sugar monitoring and goal blood sugars. She has met with a diabetic educator this pregnancy.   Most women with GDM are cured by delivery. All medications can be stopped postpartum. However, some women with GDM have undiagnosed type 2 diabetes. Therefore, I recommend obtaining a postpartum fasting blood sugar prior to discharge. Approximately 20% of women with GDM will have glucose intolerance or type 2 DM at the postpartum visit. A 2 hour glucose tolerance test should be performed 6-8 weeks postpartum. If the patient is breastfeeding, it is reasonable to delay this as appropriate. Additionally, women with GDM are at increased risk of developing type 2 DM later in life. Therefore, establishing with a primary MD who can perform annual diabetes screening is appropriate.   Given control she has demonstrated, will continue with diet control alone.     Recommendations:   We reinforced checking 4 x/day and reinforced goal blood sugars  o Given control, will defer further BS monitoring to her primary OB   Regimen: Diet   Antepartum testing should be started at 40 weeks for women who do NOT require medications.   Check fasting blood sugar in hospital  postpartum.   If normal, discontinue therapy and monitoring and recommend repeat postpartum glucose testing in 6-8 weeks postpartum using a 75 g oral glucose tolerance test.   If fasting blood glucose is between 100-125 mg/dl or impaired glucose tolerance is noted on 2 hour glucose test, refer to primary care as appropriate.    An ultrasound for estimated fetal weight should be obtained within 3 weeks of anticipated delivery; if the EFW is >= 4500 grams, a  should be offered.    Delivery timing:  Well controlled with diet: 39 0/7 - 40 6/7 weeks gestation (CURRENTLY)  Oral agent or insulin required: 39 0/7 - 39 6/ 7 weeks gestation  Poorly controlled on oral agent or insulin: 38 0/7 - 38 6/7 weeks gestation

## 2023-11-03 NOTE — ASSESSMENT & PLAN NOTE
Patient reports her prior child was born with a VSD that required a large surgery. She is currently followed by Dr. Hewitt at [facility] for WPW and cardiac check ups.  No ECHO done this pregnancy. Targeted scan was reassuring.   Given the risk of CHD, would recommend primary OB alert pediatrics of this history and evaluate the baby to determine if further workup is needed after birth.  Due to late GA, prenatal ECHO would not be available.

## 2023-11-07 ENCOUNTER — ROUTINE PRENATAL (OUTPATIENT)
Dept: OBSTETRICS AND GYNECOLOGY | Facility: CLINIC | Age: 38
End: 2023-11-07
Payer: MEDICAID

## 2023-11-07 VITALS
SYSTOLIC BLOOD PRESSURE: 120 MMHG | BODY MASS INDEX: 30.27 KG/M2 | WEIGHT: 181.88 LBS | DIASTOLIC BLOOD PRESSURE: 70 MMHG

## 2023-11-07 DIAGNOSIS — O24.410 DIET CONTROLLED GESTATIONAL DIABETES MELLITUS (GDM) IN THIRD TRIMESTER: ICD-10-CM

## 2023-11-07 DIAGNOSIS — O09.299 CURRENT SINGLETON PREGNANCY WITH HISTORY OF CONGENITAL HEART DISEASE IN PRIOR CHILD, ANTEPARTUM: ICD-10-CM

## 2023-11-07 DIAGNOSIS — O34.219 HISTORY OF CESAREAN DELIVERY, CURRENTLY PREGNANT: ICD-10-CM

## 2023-11-07 DIAGNOSIS — Z34.80 SUPERVISION OF OTHER NORMAL PREGNANCY, ANTEPARTUM: Primary | ICD-10-CM

## 2023-11-07 PROCEDURE — 99999 PR PBB SHADOW E&M-EST. PATIENT-LVL III: CPT | Mod: PBBFAC,,, | Performed by: OBSTETRICS & GYNECOLOGY

## 2023-11-07 PROCEDURE — 99213 OFFICE O/P EST LOW 20 MIN: CPT | Mod: PBBFAC,TH | Performed by: OBSTETRICS & GYNECOLOGY

## 2023-11-07 PROCEDURE — 99213 PR OFFICE/OUTPT VISIT, EST, LEVL III, 20-29 MIN: ICD-10-PCS | Mod: TH,S$PBB,, | Performed by: OBSTETRICS & GYNECOLOGY

## 2023-11-07 PROCEDURE — 99213 OFFICE O/P EST LOW 20 MIN: CPT | Mod: TH,S$PBB,, | Performed by: OBSTETRICS & GYNECOLOGY

## 2023-11-07 PROCEDURE — 99999 PR PBB SHADOW E&M-EST. PATIENT-LVL III: ICD-10-PCS | Mod: PBBFAC,,, | Performed by: OBSTETRICS & GYNECOLOGY

## 2023-11-07 NOTE — PROGRESS NOTES
Complaints today: Ms. Toussaint reports that she feels some cramping but otherwise doing well. Normal fetal movements with no vaginal bleeding, LOF or contractions at this time.       /70   Wt 82.5 kg (181 lb 14.1 oz)   LMP 2023 (Exact Date)   BMI 30.27 kg/m²     37 y.o., at 37w1d by Estimated Date of Delivery: 23  Patient Active Problem List   Diagnosis    Pinguecula of left eye    History of ectopic pregnancy    Elevated blood-pressure reading without diagnosis of hypertension    Excessive and frequent menstruation    Graves disease    Goiter    Iron deficiency anemia    Migraine    Overweight with body mass index (BMI) 25.0-29.9    Vitamin D deficiency    Chronic pain of both shoulders    Amenorrhea    Positive pregnancy test    Supervision of other normal pregnancy, antepartum    Diet controlled gestational diabetes mellitus (GDM) in third trimester    History of  delivery, currently pregnant    Current boyce pregnancy with history of congenital heart disease in prior child, antepartum     OB History    Para Term  AB Living   4 1 1 0 2 1   SAB IAB Ectopic Multiple Live Births   1 0 1 0 1      # Outcome Date GA Lbr Misbah/2nd Weight Sex Delivery Anes PTL Lv   4 Current            3 SAB 2022 12w0d    SAB      2 Ectopic 21     ECTOPIC      1 Term 04/17/15 38w0d  2.92 kg (6 lb 7 oz) F CS-LTranv EPI N STEVEN      Complications: VSD (ventricular septal defect)       Dating reviewed    Allergies and problem list reviewed and updated    Medical and surgical history reviewed    Prenatal labs reviewed and updated    Physical Exam:  ABD: soft, gravid, nontender, 37cm    Assessment:  Doreen was seen today for routine prenatal visit.    Diagnoses and all orders for this visit:    Supervision of other normal pregnancy, antepartum  - Doing well    History of  delivery, currently pregnant  -  if she dilates or at least 2-3 cm for induction  - If not,  1LTCS 11/20-11/23    Current boyce pregnancy with history of congenital heart disease in prior child, antepartum  - Will inform peds after delivery    Diet controlled gestational diabetes mellitus (GDM) in third trimester  - Diet controlled  - Delivery at 39-39w6d      No orders of the defined types were placed in this encounter.      Follow up in about 1 week (around 11/14/2023) for Routine OB.

## 2023-11-14 ENCOUNTER — ROUTINE PRENATAL (OUTPATIENT)
Dept: OBSTETRICS AND GYNECOLOGY | Facility: CLINIC | Age: 38
End: 2023-11-14
Payer: MEDICAID

## 2023-11-14 VITALS — DIASTOLIC BLOOD PRESSURE: 70 MMHG | WEIGHT: 185.5 LBS | SYSTOLIC BLOOD PRESSURE: 122 MMHG | BODY MASS INDEX: 30.87 KG/M2

## 2023-11-14 DIAGNOSIS — O24.410 DIET CONTROLLED GESTATIONAL DIABETES MELLITUS (GDM) IN THIRD TRIMESTER: ICD-10-CM

## 2023-11-14 DIAGNOSIS — Z98.891 HISTORY OF CESAREAN DELIVERY: ICD-10-CM

## 2023-11-14 DIAGNOSIS — Z34.80 SUPERVISION OF OTHER NORMAL PREGNANCY, ANTEPARTUM: Primary | ICD-10-CM

## 2023-11-14 DIAGNOSIS — O34.219 HISTORY OF CESAREAN DELIVERY, CURRENTLY PREGNANT: ICD-10-CM

## 2023-11-14 PROCEDURE — 99999 PR PBB SHADOW E&M-EST. PATIENT-LVL III: CPT | Mod: PBBFAC,,, | Performed by: OBSTETRICS & GYNECOLOGY

## 2023-11-14 PROCEDURE — 99213 PR OFFICE/OUTPT VISIT, EST, LEVL III, 20-29 MIN: ICD-10-PCS | Mod: TH,S$PBB,, | Performed by: OBSTETRICS & GYNECOLOGY

## 2023-11-14 PROCEDURE — 99213 OFFICE O/P EST LOW 20 MIN: CPT | Mod: TH,S$PBB,, | Performed by: OBSTETRICS & GYNECOLOGY

## 2023-11-14 PROCEDURE — 99213 OFFICE O/P EST LOW 20 MIN: CPT | Mod: PBBFAC,TH | Performed by: OBSTETRICS & GYNECOLOGY

## 2023-11-14 PROCEDURE — 99999 PR PBB SHADOW E&M-EST. PATIENT-LVL III: ICD-10-PCS | Mod: PBBFAC,,, | Performed by: OBSTETRICS & GYNECOLOGY

## 2023-11-14 RX ORDER — SODIUM CHLORIDE, SODIUM LACTATE, POTASSIUM CHLORIDE, CALCIUM CHLORIDE 600; 310; 30; 20 MG/100ML; MG/100ML; MG/100ML; MG/100ML
INJECTION, SOLUTION INTRAVENOUS CONTINUOUS
Status: CANCELLED | OUTPATIENT
Start: 2023-11-14

## 2023-11-14 RX ORDER — FAMOTIDINE 10 MG/ML
20 INJECTION INTRAVENOUS
Status: CANCELLED | OUTPATIENT
Start: 2023-11-14

## 2023-11-14 RX ORDER — MISOPROSTOL 100 UG/1
800 TABLET ORAL
Status: CANCELLED | OUTPATIENT
Start: 2023-11-14

## 2023-11-14 RX ORDER — CARBOPROST TROMETHAMINE 250 UG/ML
250 INJECTION, SOLUTION INTRAMUSCULAR
Status: CANCELLED | OUTPATIENT
Start: 2023-11-14

## 2023-11-14 RX ORDER — MUPIROCIN 20 MG/G
OINTMENT TOPICAL
Status: CANCELLED | OUTPATIENT
Start: 2023-11-14

## 2023-11-14 RX ORDER — OXYTOCIN/RINGER'S LACTATE 30/500 ML
95 PLASTIC BAG, INJECTION (ML) INTRAVENOUS ONCE
Status: CANCELLED | OUTPATIENT
Start: 2023-11-14 | End: 2023-11-14

## 2023-11-14 RX ORDER — OXYTOCIN/RINGER'S LACTATE 30/500 ML
334 PLASTIC BAG, INJECTION (ML) INTRAVENOUS ONCE
Status: CANCELLED | OUTPATIENT
Start: 2023-11-14 | End: 2023-11-14

## 2023-11-14 RX ORDER — SODIUM CITRATE AND CITRIC ACID MONOHYDRATE 334; 500 MG/5ML; MG/5ML
30 SOLUTION ORAL
Status: CANCELLED | OUTPATIENT
Start: 2023-11-14

## 2023-11-14 RX ORDER — METHYLERGONOVINE MALEATE 0.2 MG/ML
200 INJECTION INTRAVENOUS
Status: CANCELLED | OUTPATIENT
Start: 2023-11-14

## 2023-11-14 NOTE — PROGRESS NOTES
Complaints today:Ms. Toussaint reports that she is doing well but she has been having contractions. She reports that she feels with no complaints. Normal fetal movements with no vaginal bleeding, LOF or contractions at this time.     /70   Wt 84.1 kg (185 lb 8.3 oz)   LMP 2023 (Exact Date)   BMI 30.87 kg/m²     37 y.o., at 38w1d by Estimated Date of Delivery: 23  Patient Active Problem List   Diagnosis    Pinguecula of left eye    History of ectopic pregnancy    Elevated blood-pressure reading without diagnosis of hypertension    Excessive and frequent menstruation    Graves disease    Goiter    Iron deficiency anemia    Migraine    Overweight with body mass index (BMI) 25.0-29.9    Vitamin D deficiency    Chronic pain of both shoulders    Amenorrhea    Positive pregnancy test    Supervision of other normal pregnancy, antepartum    Diet controlled gestational diabetes mellitus (GDM) in third trimester    History of  delivery, currently pregnant    Current boyce pregnancy with history of congenital heart disease in prior child, antepartum     OB History    Para Term  AB Living   4 1 1 0 2 1   SAB IAB Ectopic Multiple Live Births   1 0 1 0 1      # Outcome Date GA Lbr Misbah/2nd Weight Sex Delivery Anes PTL Lv   4 Current            3 SAB 2022 12w0d    SAB      2 Ectopic 21     ECTOPIC      1 Term 04/17/15 38w0d  2.92 kg (6 lb 7 oz) F CS-LTranv EPI N STEVEN      Complications: VSD (ventricular septal defect)       Dating reviewed    Allergies and problem list reviewed and updated    Medical and surgical history reviewed    Prenatal labs reviewed and updated    Physical Exam:  ABD: soft, gravid, nontender, 38CM    Assessment:    Doreen was seen today for routine prenatal visit.    Diagnoses and all orders for this visit:    Supervision of other normal pregnancy, antepartum    Diet controlled gestational diabetes mellitus (GDM) in third trimester    History of   delivery, currently pregnant  -     Admit to Inpatient; Standing  -     Vital signs; Standing  -     Verify informed consent; Standing  -     Fetal monitoring WITH contraction monitoring; Standing  -     Branham to Gravity; Standing  -     Insert peripheral IV; Standing  -     Chlorhexidine (CHG) 2% Wipes; Standing  -     Clip and Prep Suprapubic; Standing  -     Notify NICU to attend birth; Standing  -     Notify Physician; Standing  -     Diet NPO; Standing  -     miSOPROStoL tablet 800 mcg  -     Chlorohexidine Gluconate Bath; Standing  -     CBC auto differential; Standing  -     POCT Cord Blood Gas Umbilical Artery Cord Gas; Standing  -     POCT Cord Blood Gas Umbilical Vein Cord Gas; Standing  -     Type & Screen, Labor & Delivery; Standing  -     Inpatient consult to Anesthesiology; Standing  -     Full code; Standing  -     Place MELINDA hose; Standing  -     Place sequential compression device; Standing    History of  delivery    Other orders  -     Progressive Mobility Protocol (mobilize patient to their highest level of functioning at least twice daily); Standing  -     Bed rest With bathroom privileges; Standing  -     lactated ringers bolus 1,000 mL  -     lactated ringers infusion  -     IP VTE LOW RISK PATIENT; Standing  -     sodium citrate-citric acid 500-334 mg/5 ml solution 30 mL  -     famotidine (PF) injection 20 mg  -     methylergonovine injection 200 mcg  -     carboprost injection 250 mcg  -     mupirocin 2 % ointment  -     oxytocin 30 units in 500 mL lactated ringers infusion (non-titrating)  -     oxytocin 30 units in 500 mL lactated ringers infusion (non-titrating)  -     ceFAZolin (ANCEF) 2 g in dextrose 5 % (D5W) 50 mL IVPB        No orders of the defined types were placed in this encounter.      Follow up in about 1 year (around 2024) for Routine OB.

## 2023-11-15 ENCOUNTER — NURSE TRIAGE (OUTPATIENT)
Dept: ADMINISTRATIVE | Facility: CLINIC | Age: 38
End: 2023-11-15
Payer: MEDICAID

## 2023-11-15 ENCOUNTER — TELEPHONE (OUTPATIENT)
Dept: OBSTETRICS AND GYNECOLOGY | Facility: CLINIC | Age: 38
End: 2023-11-15
Payer: MEDICAID

## 2023-11-15 ENCOUNTER — PATIENT MESSAGE (OUTPATIENT)
Dept: OBSTETRICS AND GYNECOLOGY | Facility: CLINIC | Age: 38
End: 2023-11-15
Payer: MEDICAID

## 2023-11-15 NOTE — TELEPHONE ENCOUNTER
Pt reports brown mucous discharge with discomfort in her pelvic area. Advised, per protocol. Verbalizes understanding. She would like follow up in this regard.    Reason for Disposition   Constant abdominal pain and present > 2 hours    Additional Information   Negative: Pregnant 23 or more weeks and baby is moving less today (e.g., kick count < 5 in 1 hour or < 10 in 2 hours)   Negative: Pregnant 24-36 weeks () and pinkish or brownish mucous discharge    Protocols used: Pregnancy - Vaginal [id number]

## 2023-11-15 NOTE — TELEPHONE ENCOUNTER
----- Message from Natasha Petit sent at 11/15/2023 11:25 AM CST -----  Regarding: self [phone]  Type: Patient Call Back    Who called: self     What is the request in detail: asked for a call back, she is not sure if she lost her mucus plug, she is 38 wks pregnant , tranf to on call nurse.    Can the clinic reply by [facility]? no    Would the patient rather a call back or a response via [facility]? Call back     Best call back number: [phone]

## 2023-11-15 NOTE — TELEPHONE ENCOUNTER
Pt was called. Pt is not having any contractions and baby is moving. Pt was advise if she starts to have contractions that is 3-5 minutes apart to go to the hospital or  if the baby stop moving. Pt voiced understanding.

## 2023-11-17 ENCOUNTER — ROUTINE PRENATAL (OUTPATIENT)
Dept: OBSTETRICS AND GYNECOLOGY | Facility: CLINIC | Age: 38
End: 2023-11-17
Payer: MEDICAID

## 2023-11-17 VITALS — DIASTOLIC BLOOD PRESSURE: 70 MMHG | WEIGHT: 184.5 LBS | SYSTOLIC BLOOD PRESSURE: 128 MMHG | BODY MASS INDEX: 30.71 KG/M2

## 2023-11-17 DIAGNOSIS — Z34.80 SUPERVISION OF OTHER NORMAL PREGNANCY, ANTEPARTUM: Primary | ICD-10-CM

## 2023-11-17 DIAGNOSIS — O24.410 DIET CONTROLLED GESTATIONAL DIABETES MELLITUS (GDM) IN THIRD TRIMESTER: ICD-10-CM

## 2023-11-17 DIAGNOSIS — O34.219 HISTORY OF CESAREAN DELIVERY, CURRENTLY PREGNANT: ICD-10-CM

## 2023-11-17 PROCEDURE — 99213 PR OFFICE/OUTPT VISIT, EST, LEVL III, 20-29 MIN: ICD-10-PCS | Mod: TH,S$PBB,, | Performed by: OBSTETRICS & GYNECOLOGY

## 2023-11-17 PROCEDURE — 99999 PR PBB SHADOW E&M-EST. PATIENT-LVL III: ICD-10-PCS | Mod: PBBFAC,,, | Performed by: OBSTETRICS & GYNECOLOGY

## 2023-11-17 PROCEDURE — 99999 PR PBB SHADOW E&M-EST. PATIENT-LVL III: CPT | Mod: PBBFAC,,, | Performed by: OBSTETRICS & GYNECOLOGY

## 2023-11-17 PROCEDURE — 99213 OFFICE O/P EST LOW 20 MIN: CPT | Mod: TH,S$PBB,, | Performed by: OBSTETRICS & GYNECOLOGY

## 2023-11-17 PROCEDURE — 99213 OFFICE O/P EST LOW 20 MIN: CPT | Mod: PBBFAC,TH | Performed by: OBSTETRICS & GYNECOLOGY

## 2023-11-17 NOTE — PROGRESS NOTES
Complaints today: Ms. Toussaint reports that she loss her mucus plug. She is otherwise doing well with no complaints. Normal fetal movements with no vaginal bleeding, LOF or contractions at this time.     /70   Wt 83.7 kg (184 lb 8.4 oz)   LMP 2023 (Exact Date)   BMI 30.71 kg/m²     37 y.o., at 38w4d by Estimated Date of Delivery: 23  Patient Active Problem List   Diagnosis    Pinguecula of left eye    History of ectopic pregnancy    Elevated blood-pressure reading without diagnosis of hypertension    Excessive and frequent menstruation    Graves disease    Goiter    Iron deficiency anemia    Migraine    Overweight with body mass index (BMI) 25.0-29.9    Vitamin D deficiency    Chronic pain of both shoulders    Amenorrhea    Positive pregnancy test    Supervision of other normal pregnancy, antepartum    Diet controlled gestational diabetes mellitus (GDM) in third trimester    History of  delivery, currently pregnant    Current boyce pregnancy with history of congenital heart disease in prior child, antepartum     OB History    Para Term  AB Living   4 1 1 0 2 1   SAB IAB Ectopic Multiple Live Births   1 0 1 0 1      # Outcome Date GA Lbr Misbah/2nd Weight Sex Delivery Anes PTL Lv   4 Current            3 SAB 2022 12w0d    SAB      2 Ectopic 21     ECTOPIC      1 Term 04/17/15 38w0d  2.92 kg (6 lb 7 oz) F CS-LTranv EPI N STEVEN      Complications: VSD (ventricular septal defect)       Dating reviewed    Allergies and problem list reviewed and updated    Medical and surgical history reviewed    Prenatal labs reviewed and updated    Physical Exam:  ABD: soft, gravid, nontender, 39cm    Assessment:  Doreen was seen today for routine prenatal visit.    Diagnoses and all orders for this visit:    Supervision of other normal pregnancy, antepartum  - Doing well    Diet controlled gestational diabetes mellitus (GDM) in third trimester  - Diet controlled    History of   delivery, currently pregnant  - Scheduled 11/20      No orders of the defined types were placed in this encounter.      Follow up in about 1 week (around 11/24/2023) for Routine OB.

## 2023-11-18 ENCOUNTER — PATIENT MESSAGE (OUTPATIENT)
Dept: OBSTETRICS AND GYNECOLOGY | Facility: HOSPITAL | Age: 38
End: 2023-11-18
Payer: MEDICAID

## 2023-11-18 ENCOUNTER — HOSPITAL ENCOUNTER (INPATIENT)
Facility: HOSPITAL | Age: 38
LOS: 4 days | Discharge: HOME | End: 2023-11-22
Attending: STUDENT IN AN ORGANIZED HEALTH CARE EDUCATION/TRAINING PROGRAM | Admitting: OBSTETRICS & GYNECOLOGY
Payer: MEDICAID

## 2023-11-18 DIAGNOSIS — Z98.891 STATUS POST REPEAT LOW TRANSVERSE CESAREAN SECTION: Primary | ICD-10-CM

## 2023-11-18 DIAGNOSIS — Z3A.39 39 WEEKS GESTATION OF PREGNANCY: ICD-10-CM

## 2023-11-18 DIAGNOSIS — R42 DIZZINESS: ICD-10-CM

## 2023-11-18 DIAGNOSIS — O34.219 HISTORY OF CESAREAN DELIVERY, CURRENTLY PREGNANT: ICD-10-CM

## 2023-11-18 DIAGNOSIS — Z98.891 HISTORY OF CESAREAN DELIVERY: ICD-10-CM

## 2023-11-18 LAB
ALBUMIN SERPL BCP-MCNC: 2.7 G/DL (ref 3.5–5.2)
ALP SERPL-CCNC: 155 U/L (ref 55–135)
ALT SERPL W/O P-5'-P-CCNC: 8 U/L (ref 10–44)
ANION GAP SERPL CALC-SCNC: 8 MMOL/L (ref 8–16)
AST SERPL-CCNC: 13 U/L (ref 10–40)
BACTERIA #/AREA URNS HPF: ABNORMAL /HPF
BASOPHILS # BLD AUTO: 0.01 K/UL (ref 0–0.2)
BASOPHILS NFR BLD: 0.2 % (ref 0–1.9)
BILIRUB SERPL-MCNC: 0.6 MG/DL (ref 0.1–1)
BILIRUB UR QL STRIP: NEGATIVE
BUN SERPL-MCNC: 9 MG/DL (ref 6–20)
CALCIUM SERPL-MCNC: 8.6 MG/DL (ref 8.7–10.5)
CHLORIDE SERPL-SCNC: 111 MMOL/L (ref 95–110)
CLARITY UR: ABNORMAL
CO2 SERPL-SCNC: 20 MMOL/L (ref 23–29)
COLOR UR: YELLOW
CREAT SERPL-MCNC: 0.6 MG/DL (ref 0.5–1.4)
DIFFERENTIAL METHOD: ABNORMAL
EOSINOPHIL # BLD AUTO: 0.1 K/UL (ref 0–0.5)
EOSINOPHIL NFR BLD: 1.6 % (ref 0–8)
ERYTHROCYTE [DISTWIDTH] IN BLOOD BY AUTOMATED COUNT: 15.7 % (ref 11.5–14.5)
EST. GFR  (NO RACE VARIABLE): >60 ML/MIN/1.73 M^2
GLUCOSE SERPL-MCNC: 78 MG/DL (ref 70–110)
GLUCOSE UR QL STRIP: NEGATIVE
HCT VFR BLD AUTO: 34.6 % (ref 37–48.5)
HGB BLD-MCNC: 10.8 G/DL (ref 12–16)
HGB UR QL STRIP: ABNORMAL
IMM GRANULOCYTES # BLD AUTO: 0.01 K/UL (ref 0–0.04)
IMM GRANULOCYTES NFR BLD AUTO: 0.2 % (ref 0–0.5)
KETONES UR QL STRIP: NEGATIVE
LDH SERPL L TO P-CCNC: 192 U/L (ref 110–260)
LEUKOCYTE ESTERASE UR QL STRIP: ABNORMAL
LYMPHOCYTES # BLD AUTO: 1.6 K/UL (ref 1–4.8)
LYMPHOCYTES NFR BLD: 31.8 % (ref 18–48)
MAGNESIUM SERPL-MCNC: 1.9 MG/DL (ref 1.6–2.6)
MCH RBC QN AUTO: 25.9 PG (ref 27–31)
MCHC RBC AUTO-ENTMCNC: 31.2 G/DL (ref 32–36)
MCV RBC AUTO: 83 FL (ref 82–98)
MICROSCOPIC COMMENT: ABNORMAL
MONOCYTES # BLD AUTO: 0.6 K/UL (ref 0.3–1)
MONOCYTES NFR BLD: 11 % (ref 4–15)
NEUTROPHILS # BLD AUTO: 2.8 K/UL (ref 1.8–7.7)
NEUTROPHILS NFR BLD: 55.2 % (ref 38–73)
NITRITE UR QL STRIP: NEGATIVE
NRBC BLD-RTO: 0 /100 WBC
PH UR STRIP: 7 [PH] (ref 5–8)
PLATELET # BLD AUTO: 254 K/UL (ref 150–450)
PMV BLD AUTO: 9.6 FL (ref 9.2–12.9)
POCT GLUCOSE: 118 MG/DL (ref 70–110)
POTASSIUM SERPL-SCNC: 4 MMOL/L (ref 3.5–5.1)
PROT SERPL-MCNC: 6.4 G/DL (ref 6–8.4)
PROT UR QL STRIP: ABNORMAL
RBC # BLD AUTO: 4.17 M/UL (ref 4–5.4)
RBC #/AREA URNS HPF: 8 /HPF (ref 0–4)
SODIUM SERPL-SCNC: 139 MMOL/L (ref 136–145)
SP GR UR STRIP: 1.02 (ref 1–1.03)
SQUAMOUS #/AREA URNS HPF: 31 /HPF
URN SPEC COLLECT METH UR: ABNORMAL
UROBILINOGEN UR STRIP-ACNC: ABNORMAL EU/DL
WBC # BLD AUTO: 5.07 K/UL (ref 3.9–12.7)
WBC #/AREA URNS HPF: 5 /HPF (ref 0–5)

## 2023-11-18 PROCEDURE — 93005 ELECTROCARDIOGRAM TRACING: CPT

## 2023-11-18 PROCEDURE — 63600175 PHARM REV CODE 636 W HCPCS: Performed by: STUDENT IN AN ORGANIZED HEALTH CARE EDUCATION/TRAINING PROGRAM

## 2023-11-18 PROCEDURE — 93010 EKG 12-LEAD: ICD-10-PCS | Mod: ,,, | Performed by: INTERNAL MEDICINE

## 2023-11-18 PROCEDURE — 83615 LACTATE (LD) (LDH) ENZYME: CPT | Performed by: STUDENT IN AN ORGANIZED HEALTH CARE EDUCATION/TRAINING PROGRAM

## 2023-11-18 PROCEDURE — 99285 EMERGENCY DEPT VISIT HI MDM: CPT | Mod: 25

## 2023-11-18 PROCEDURE — 96361 HYDRATE IV INFUSION ADD-ON: CPT

## 2023-11-18 PROCEDURE — 96374 THER/PROPH/DIAG INJ IV PUSH: CPT

## 2023-11-18 PROCEDURE — 93010 ELECTROCARDIOGRAM REPORT: CPT | Mod: ,,, | Performed by: INTERNAL MEDICINE

## 2023-11-18 PROCEDURE — 25000003 PHARM REV CODE 250: Performed by: STUDENT IN AN ORGANIZED HEALTH CARE EDUCATION/TRAINING PROGRAM

## 2023-11-18 PROCEDURE — 80053 COMPREHEN METABOLIC PANEL: CPT | Performed by: STUDENT IN AN ORGANIZED HEALTH CARE EDUCATION/TRAINING PROGRAM

## 2023-11-18 PROCEDURE — 11000001 HC ACUTE MED/SURG PRIVATE ROOM

## 2023-11-18 PROCEDURE — 81000 URINALYSIS NONAUTO W/SCOPE: CPT | Performed by: STUDENT IN AN ORGANIZED HEALTH CARE EDUCATION/TRAINING PROGRAM

## 2023-11-18 PROCEDURE — 83735 ASSAY OF MAGNESIUM: CPT | Performed by: STUDENT IN AN ORGANIZED HEALTH CARE EDUCATION/TRAINING PROGRAM

## 2023-11-18 PROCEDURE — 85025 COMPLETE CBC W/AUTO DIFF WBC: CPT | Performed by: STUDENT IN AN ORGANIZED HEALTH CARE EDUCATION/TRAINING PROGRAM

## 2023-11-18 RX ORDER — SODIUM CHLORIDE, SODIUM LACTATE, POTASSIUM CHLORIDE, CALCIUM CHLORIDE 600; 310; 30; 20 MG/100ML; MG/100ML; MG/100ML; MG/100ML
INJECTION, SOLUTION INTRAVENOUS CONTINUOUS
Status: DISCONTINUED | OUTPATIENT
Start: 2023-11-18 | End: 2023-11-18

## 2023-11-18 RX ORDER — SODIUM CHLORIDE, SODIUM LACTATE, POTASSIUM CHLORIDE, CALCIUM CHLORIDE 600; 310; 30; 20 MG/100ML; MG/100ML; MG/100ML; MG/100ML
INJECTION, SOLUTION INTRAVENOUS CONTINUOUS
Status: ACTIVE | OUTPATIENT
Start: 2023-11-18 | End: 2023-11-19

## 2023-11-18 RX ORDER — MECLIZINE HYDROCHLORIDE 25 MG/1
25 TABLET ORAL ONCE
Status: COMPLETED | OUTPATIENT
Start: 2023-11-18 | End: 2023-11-18

## 2023-11-18 RX ORDER — DIPHENHYDRAMINE HCL 25 MG
50 CAPSULE ORAL EVERY 6 HOURS PRN
Status: DISCONTINUED | OUTPATIENT
Start: 2023-11-18 | End: 2023-11-20

## 2023-11-18 RX ORDER — ONDANSETRON 4 MG/1
4 TABLET, ORALLY DISINTEGRATING ORAL EVERY 6 HOURS PRN
Status: DISCONTINUED | OUTPATIENT
Start: 2023-11-18 | End: 2023-11-20

## 2023-11-18 RX ORDER — SODIUM CHLORIDE, SODIUM LACTATE, POTASSIUM CHLORIDE, CALCIUM CHLORIDE 600; 310; 30; 20 MG/100ML; MG/100ML; MG/100ML; MG/100ML
INJECTION, SOLUTION INTRAVENOUS DAILY PRN
Status: DISCONTINUED | OUTPATIENT
Start: 2023-11-18 | End: 2023-11-19

## 2023-11-18 RX ORDER — ONDANSETRON 2 MG/ML
4 INJECTION INTRAMUSCULAR; INTRAVENOUS
Status: COMPLETED | OUTPATIENT
Start: 2023-11-18 | End: 2023-11-18

## 2023-11-18 RX ADMIN — SODIUM CHLORIDE 1000 ML: 9 INJECTION, SOLUTION INTRAVENOUS at 10:11

## 2023-11-18 RX ADMIN — SODIUM CHLORIDE, POTASSIUM CHLORIDE, SODIUM LACTATE AND CALCIUM CHLORIDE: 600; 310; 30; 20 INJECTION, SOLUTION INTRAVENOUS at 11:11

## 2023-11-18 RX ADMIN — MECLIZINE HYDROCHLORIDE 25 MG: 25 TABLET ORAL at 03:11

## 2023-11-18 RX ADMIN — SODIUM CHLORIDE, POTASSIUM CHLORIDE, SODIUM LACTATE AND CALCIUM CHLORIDE 300 ML: 600; 310; 30; 20 INJECTION, SOLUTION INTRAVENOUS at 02:11

## 2023-11-18 RX ADMIN — DIPHENHYDRAMINE HYDROCHLORIDE 50 MG: 25 CAPSULE ORAL at 09:11

## 2023-11-18 RX ADMIN — ONDANSETRON 4 MG: 2 INJECTION INTRAMUSCULAR; INTRAVENOUS at 11:11

## 2023-11-18 NOTE — ED TRIAGE NOTES
Pt to ED reporting dizziness that has progressively gotten worse over the course of the week. Pt reporting it started as light headedness but now the room is spinning and has caused pt to feel off balance. Pt is 39 weeks pregnant, with a scheduled  for Monday. Pt denying any strong frequent contractions at this time. Pt reporting gestational DM.

## 2023-11-18 NOTE — NURSING
Reports feeling a little better since mecliaine. States that the room is not spinning but still doesn't feel normal.

## 2023-11-18 NOTE — ED PROVIDER NOTES
"Encounter Date: 2023       History     Chief Complaint   Patient presents with    Dizziness     Patient reports constant dizziness and fuzzy feeling in the brain.  Was stumbling this morning and off balance around 7-8 pm last night States currently 39 weeks pregnant did loose mucus plug, and having blood when wipes. CVR rule out called in triage.      38yo female 39 WGA presents with dizziness.  Patient reports since 7:00 p.m. last night she has felt dizzy.  It initially was upon exertion in room spinning but also present at rest.  It is currently not present at rest.  She had similar symptoms a few days ago that have been on and off but they have not been consistent like they have for the past 12 or so hours.  She also reports just feeling "fuzzy" in the brain.  Denies headache, fever, chills, changes in vision, difficulty with speech, slurred speech, difficulty with word finding.  Denies chest pain, shortness of breath, abdominal pain, vomiting, dysuria, hematuria, diarrhea, constipation, black/bloody stools.  She was seen by her OBGYN yesterday and is scheduled for a  in 2 days.  She already lost her mucus plug.  New increase in vaginal bleeding.  She does feel off balance when she stands.    The history is provided by the patient.     Review of patient's allergies indicates:   Allergen Reactions    Vinyl ether Hives     Past Medical History:   Diagnosis Date    Graves disease 2013    Iron deficiency anemia 2021     Past Surgical History:   Procedure Laterality Date    BREAST SURGERY      Cysts removed     SECTION      DIAGNOSTIC LAPAROSCOPY N/A 2021    Procedure: LAPAROSCOPY, DIAGNOSTIC, possible salpingostomy/ salpingectomy/  D& C;  Surgeon: Cheri Hunter MD;  Location: [facility] OR;  Service: OB/GYN;  Laterality: N/A;    DILATION AND CURETTAGE OF UTERUS  2021    Procedure: DILATION AND CURETTAGE, UTERUS;  Surgeon: Cheri Hunter MD;  Location: [facility] OR;  Service: OB/GYN;; "    fibroid removal  ,     breast    HEMANGIOMA EXCISION      right thigh    LAPAROSCOPIC SALPINGECTOMY Right 2021    Procedure: SALPINGECTOMY, LAPAROSCOPIC;  Surgeon: Cheri Hunter MD;  Location: [facility];  Service: OB/GYN;  Laterality: Right;     Family History   Problem Relation Age of Onset    Pacemaker/defibrilator Mother     Hypertension Mother     Kidney failure Mother     Hypertension Father     Throat cancer Father     Breast cancer Maternal Grandmother     Hypertension Maternal Grandmother     Colon cancer Paternal Grandfather      labor Neg Hx     Stroke Neg Hx     Diabetes Neg Hx      Social History     Tobacco Use    Smoking status: Never    Smokeless tobacco: Never   Substance Use Topics    Alcohol use: Not Currently     Comment: occasional    Drug use: No     Review of Systems    Physical Exam     Initial Vitals [23 1002]   BP Pulse Resp Temp SpO2   136/84 88 20 97.9 °F (36.6 °C) 100 %      MAP       --         Physical Exam    Nursing note and vitals reviewed.  Constitutional: She appears well-developed and well-nourished. She is not diaphoretic. No distress.   HENT:   Head: Normocephalic and atraumatic.   Eyes: Conjunctivae and EOM are normal. Pupils are equal, round, and reactive to light.   Neck: Neck supple.   Normal range of motion.  Cardiovascular:  Normal rate, regular rhythm, normal heart sounds and intact distal pulses.           No murmur heard.  Pulmonary/Chest: Breath sounds normal. No respiratory distress. She has no wheezes. She has no rhonchi. She has no rales.   Abdominal: Abdomen is soft. Bowel sounds are normal. She exhibits no distension. There is no abdominal tenderness.   Gravid abdomen There is no rebound and no guarding.   Musculoskeletal:         General: No tenderness or edema.      Cervical back: Normal range of motion and neck supple.     Neurological: She is alert and oriented to person, place, and time. GCS score is 15. GCS eye subscore is  4. GCS verbal subscore is 5. GCS motor subscore is 6.   NIHSS ([place] of Health Stroke Scale)   1a  Level of consciousness: 0=alert; keenly responsive  1b. LOC questions:  0 = Answers both questions correctly  1c. LOC commands: 0=Answers both tasks correctly  2.  Best Gaze: 0=normal  3. Visual: 0=No visual loss  4. Facial Palsy: 0=Normal symmetric movement  5a. Motor left arm: 0=No drift, limb holds 90 (or 45) degrees for full 10 seconds  5b.  Motor right arm: 0=No drift, limb holds 90 (or 45) degrees for full 10 seconds  6a. motor left le=No drift, limb holds 90 (or 45) degrees for full 10 seconds  6b  Motor right le=No drift, limb holds 90 (or 45) degrees for full 10 seconds  7. Limb Ataxia: 0=Absent  8.  Sensory: 0=Normal; no sensory loss  9. Best Language:  0=No aphasia, normal  10. Dysarthria: 0=Normal  11. Extinction and Inattention: 0=No abnormality       Total:   0      Other:   - when sitting and patient closes her eyes with arms outstretched in front of her, she sways and feels off balance         Skin: Skin is warm and dry. Capillary refill takes less than 2 seconds.         ED Course   Procedures  Labs Reviewed   CBC W/ AUTO DIFFERENTIAL - Abnormal; Notable for the following components:       Result Value    Hemoglobin 10.8 (*)     Hematocrit 34.6 (*)     MCH 25.9 (*)     MCHC 31.2 (*)     RDW 15.7 (*)     All other components within normal limits   COMPREHENSIVE METABOLIC PANEL - Abnormal; Notable for the following components:    Chloride 111 (*)     CO2 20 (*)     Calcium 8.6 (*)     Albumin 2.7 (*)     Alkaline Phosphatase 155 (*)     ALT 8 (*)     All other components within normal limits   URINALYSIS, REFLEX TO URINE CULTURE - Abnormal; Notable for the following components:    Appearance, UA Hazy (*)     Protein, UA Trace (*)     Occult Blood UA Trace (*)     Urobilinogen, UA 2.0-3.0 (*)     Leukocytes, UA Trace (*)     All other components within normal limits    Narrative:      Specimen Source->Urine   URINALYSIS MICROSCOPIC - Abnormal; Notable for the following components:    RBC, UA 8 (*)     All other components within normal limits    Narrative:     Specimen Source->Urine   POCT GLUCOSE - Abnormal; Notable for the following components:    POCT Glucose 118 (*)     All other components within normal limits   MAGNESIUM   LACTATE DEHYDROGENASE     EKG Readings: (Independently Interpreted)   Normal sinus rhythm, rate 80, no STEMI, normal intervals, overall unremarkable       Imaging Results              CT Head Without Contrast (Final result)  Result time 11/18/23 13:06:30      Final result by Ramu Sinclair MD (11/18/23 13:06:30)                   Impression:      No acute intracranial findings.      Electronically signed by: Ramu Sinclair  Date:    11/18/2023  Time:    13:06               Narrative:    EXAMINATION:  CT HEAD WITHOUT CONTRAST    CLINICAL HISTORY:  Headache, new or worsening, neuro deficit (Age 19-49y);    TECHNIQUE:  Low dose axial images were obtained through the head.  Coronal and sagittal reformations were also performed. Contrast was not administered.    COMPARISON:  CT head performed 01/02/2021    FINDINGS:  Blood: No acute intracranial hemorrhage.    Parenchyma: No definite loss of gray-white differentiation to suggest acute or subacute transcortical infarct.    Ventricles/Extra-axial spaces: No abnormal extra-axial fluid collection. Basal cisterns are patent.    Vessels: Grossly unremarkable by unenhanced technique.    Orbits: Unremarkable.    Scalp: Unremarkable.    Skull: There are no depressed skull fractures or destructive bone lesions.    Sinuses and mastoids: Essentially clear.    Other findings: None                                       Medications   lactated ringers bolus 300 mL (300 mLs Intravenous New Bag 11/18/23 4575)   lactated ringers infusion (has no administration in time range)   sodium chloride 0.9% bolus 1,000 mL 1,000 mL (0 mLs Intravenous  Stopped 11/18/23 1150)   ondansetron injection 4 mg (4 mg Intravenous Given 11/18/23 1142)   meclizine tablet 25 mg (25 mg Oral Given 11/18/23 1519)     Medical Decision Making  Hemodynamically stable 37-year-old female at 39WGA presents with 14 hour history of room spinning dizziness that is intermittently present at rest, NIH 0    Ddx:  Dehydration, orthostatic hypotension, KELI, electrolyte abnormality, CVA    Will treat symptomatically with 1L NS and zofran.  See ED course for additional information    Amount and/or Complexity of Data Reviewed  External Data Reviewed: notes.     Details: OBGYN appointment yesterday: 39cm, no cervical exam documentation found  Labs: ordered. Decision-making details documented in ED Course.  Radiology: ordered and independent interpretation performed. Decision-making details documented in ED Course.  ECG/medicine tests: ordered and independent interpretation performed. Decision-making details documented in ED Course.    Risk  Prescription drug management.               ED Course as of 11/18/23 1534   Sat Nov 18, 2023   1015 I initially evaluated the patient in triage. Discussed case with OBGYN on-call, Dr. Garcia regarding possible stroke code.  He reported that given the patient's only neurological symptom was the dizziness, he would attempt hydration and medication management 1st. [BD]   1051 Fetal heart tones 138 [BD]   1051 POCT Glucose(!): 118  Normal [BD]   1153 Urinalysis, Reflex to Urine Culture Urine, Clean Catch(!)  Bad sample given 31 squam cells. No evidence of infection. Trace protein. Overall unremarkable but difficult to interprety. Pt not hypertensive, so not preeclamptic [BD]   1153 Comprehensive metabolic panel(!)  Cmp shows mild elevation in ALP without significant electrolyte derangement or impaired renal function [BD]   1153 CBC auto differential(!)  CBC unremarkable without leukocytosis, significant anemia, or decreased platelets   [BD]   1153 LD: 192  Normal  "[BD]   1215 Patient reports that her symptoms are unchanged after the fluids and Zofran.  I have reached out to the OBGYN on-call and am awaiting callback [BD]   1240 I discussed the case with OBGYN who recommends a CT head and then moving up to the OB triage if negative [BD]   1242 Pt is not currently have room-spinning dizziness at rest but still feels "fuzzy" in the head [BD]   1322 CT Head Without Contrast  CT Head unremarkable without evidence of large-vessel infarct or intracranial hemorrhage   [BD]   1322 Patient's workup was unremarkable in the ED. after discussing the case with the on-call OBGYN, we will transfer the patient up to the OB triage. Pt agrees with the plan.  [BD]      ED Course User Index  [BD] Fawad Hernandez MD                        Clinical Impression:  Final diagnoses:  [R42] Dizziness (Primary)  [Z3A.39] 39 weeks gestation of pregnancy          ED Disposition Condition    Send to L&D Stable                Fawad Hernandez MD  11/18/23 1535    "

## 2023-11-18 NOTE — NURSING
Presented to L & D from ER on a stretcher with ER staff. Assisted to bed in room 224. Used restroom, steady gait noted, bent down without problem. Oriented to room & use of call bell, call bell in reach. Significant other with pt. Instructed on plan of care, verbalized understanding. Dr. Garcia informed of pt's presence & status in person. Will hydrate, give pt food, monitor/observe for complications. Labs reviewed by Dr. Garcia and myself.

## 2023-11-18 NOTE — PROGRESS NOTES
Ordering provider, Fawad Hernandez MD, is aware of pregnancy status and would like to obtain CT head scan. Patient was shielded 365 degrees, around the torso.

## 2023-11-19 ENCOUNTER — ANESTHESIA EVENT (OUTPATIENT)
Dept: OBSTETRICS AND GYNECOLOGY | Facility: HOSPITAL | Age: 38
End: 2023-11-19
Payer: MEDICAID

## 2023-11-19 ENCOUNTER — ANESTHESIA (OUTPATIENT)
Dept: OBSTETRICS AND GYNECOLOGY | Facility: HOSPITAL | Age: 38
End: 2023-11-19
Payer: MEDICAID

## 2023-11-19 PROCEDURE — 25000003 PHARM REV CODE 250: Performed by: STUDENT IN AN ORGANIZED HEALTH CARE EDUCATION/TRAINING PROGRAM

## 2023-11-19 PROCEDURE — 11000001 HC ACUTE MED/SURG PRIVATE ROOM

## 2023-11-19 RX ORDER — MECLIZINE HYDROCHLORIDE 25 MG/1
50 TABLET ORAL 2 TIMES DAILY
Status: DISCONTINUED | OUTPATIENT
Start: 2023-11-19 | End: 2023-11-20

## 2023-11-19 RX ADMIN — MECLIZINE HYDROCHLORIDE 50 MG: 25 TABLET ORAL at 12:11

## 2023-11-19 RX ADMIN — MECLIZINE HYDROCHLORIDE 50 MG: 25 TABLET ORAL at 11:11

## 2023-11-19 RX ADMIN — DIPHENHYDRAMINE HYDROCHLORIDE 50 MG: 25 CAPSULE ORAL at 08:11

## 2023-11-19 NOTE — NURSING
Dr. Garcia on unit updated on pt's complaints/concerns and follow up by Dr. Padgett and neuro consult & talking to Dr. DEQUAN Maria. About pt's status.

## 2023-11-19 NOTE — ASSESSMENT & PLAN NOTE
Presents with dizziness/vertigo   Ddx: vestibular neuritis, BPPV (less likely), less likely intra-cranial process. Less likely intra-cranial process.     Plan:   Check orthostatic vital signs   Continue IVF   Symptomatic control with anti-emetics, anti-histamines. Benzos as contingency   If persistent dizziness till the morning, I recommend consulting neurology

## 2023-11-19 NOTE — SUBJECTIVE & OBJECTIVE
Past Medical History:   Diagnosis Date    Graves disease 2013    Iron deficiency anemia 2021       Past Surgical History:   Procedure Laterality Date    BREAST SURGERY      Cysts removed     SECTION      DIAGNOSTIC LAPAROSCOPY N/A 2021    Procedure: LAPAROSCOPY, DIAGNOSTIC, possible salpingostomy/ salpingectomy/  D& C;  Surgeon: Cheri Hunter MD;  Location: [facility];  Service: OB/GYN;  Laterality: N/A;    DILATION AND CURETTAGE OF UTERUS  2021    Procedure: DILATION AND CURETTAGE, UTERUS;  Surgeon: Cheri Hunter MD;  Location: [facility];  Service: OB/GYN;;    fibroid removal  ,     breast    HEMANGIOMA EXCISION      right thigh    LAPAROSCOPIC SALPINGECTOMY Right 2021    Procedure: SALPINGECTOMY, LAPAROSCOPIC;  Surgeon: Cheri Hunter MD;  Location: [facility];  Service: OB/GYN;  Laterality: Right;       Review of patient's allergies indicates:   Allergen Reactions    Vinyl ether Hives       No current facility-administered medications on file prior to encounter.     Current Outpatient Medications on File Prior to Encounter   Medication Sig    aspirin (ECOTRIN) 81 MG EC tablet Take 1 tablet (81 mg total) by mouth once daily.    ergocalciferol (ERGOCALCIFEROL) 50,000 unit Cap Take 50,000 Units by mouth once a week.    ferrous sulfate 325 (65 FE) MG EC tablet Take 1 tablet (325 mg total) by mouth once daily.    prenatal 105-iron-folic ac-dha 30 mg iron- 1.4 mg-300 mg Cmpk Take by mouth.     Family History       Problem Relation (Age of Onset)    Breast cancer Maternal Grandmother    Colon cancer Paternal Grandfather    Hypertension Mother, Father, Maternal Grandmother    Kidney failure Mother    Pacemaker/defibrilator Mother    Throat cancer Father          Tobacco Use    Smoking status: Never    Smokeless tobacco: Never   Substance and Sexual Activity    Alcohol use: Not Currently     Comment: occasional    Drug use: No    Sexual activity: Yes     Partners: Male     Review  of Systems   Constitutional: Negative.    HENT: Negative.     Eyes: Negative.    Respiratory: Negative.     Cardiovascular: Negative.    Gastrointestinal: Negative.    Endocrine: Negative.    Genitourinary: Negative.    Musculoskeletal:  Positive for gait problem.   Skin: Negative.    Allergic/Immunologic: Negative.    Neurological:  Positive for dizziness. Negative for facial asymmetry, speech difficulty and numbness.   Psychiatric/Behavioral: Negative.       Objective:     Vital Signs (Most Recent):  Temp: 98.6 °F (37 °C) (11/18/23 1645)  Pulse: 75 (11/18/23 1920)  Resp: 18 (11/18/23 1700)  BP: (!) 105/57 (11/18/23 1920)  SpO2: 98 % (11/18/23 1920) Vital Signs (24h Range):  Temp:  [97.9 °F (36.6 °C)-98.6 °F (37 °C)] 98.6 °F (37 °C)  Pulse:  [72-88] 75  Resp:  [14-20] 18  SpO2:  [97 %-100 %] 98 %  BP: (103-136)/(57-86) 105/57     Weight: 84.8 kg (187 lb)  Body mass index is 31.12 kg/m².     Physical Exam  Vitals and nursing note reviewed.   Constitutional:       General: She is not in acute distress.     Appearance: Normal appearance. She is not ill-appearing.   HENT:      Head: Normocephalic and atraumatic.      Nose: Nose normal.      Mouth/Throat:      Mouth: Mucous membranes are moist.   Eyes:      Extraocular Movements: Extraocular movements intact.   Cardiovascular:      Rate and Rhythm: Normal rate.      Pulses: Normal pulses.      Heart sounds: No murmur heard.  Pulmonary:      Effort: Pulmonary effort is normal. No respiratory distress.   Abdominal:      General: Abdomen is flat. There is distension.      Palpations: Abdomen is soft.      Tenderness: There is no abdominal tenderness.   Musculoskeletal:      Right lower leg: No edema.      Left lower leg: No edema.   Skin:     General: Skin is warm.      Capillary Refill: Capillary refill takes less than 2 seconds.   Neurological:      Mental Status: She is alert.      Comments: Negative romberg test. Negative Brett-Hallpike. Unsteady gait. Able to ambulate  with assistance (fear of falling).   No focal weakness appreciated. CN II-XII intact.    Psychiatric:         Mood and Affect: Mood normal.          Significant Labs: All pertinent labs within the past 24 hours have been reviewed.    Significant Imaging: I have reviewed all pertinent imaging results/findings within the past 24 hours.

## 2023-11-19 NOTE — NURSING
Pt called out and states that she changed her mind. Informed pt that her discharge orders and discharge note by the hospital doctor had already been done. But will check to see if its too late or it can be recsended.

## 2023-11-19 NOTE — NURSING
Dr. Padgett informed of pt's request to be discharged to home and return tomorrow morning for. States that that would be fine after he reviewed her MRI.

## 2023-11-19 NOTE — NURSING
"Pt informed of negative MRI. States that she "guess she's here for the night", I stated that there is a possibility of going home if she preferred to be home but I would have to get a discharge order from the hospital doctor and her OB doctor. I asked her if she had someone to be with her and a way home. She states that she would probably go by her mom and she had a ride. She said that she would like to go home if nothing else is going to be done here. I told her that her NST was reactive and her testing was done and resulted with no bad results. She stated that she wanted to go home. I told her that I would call the hospitalist and the OB doctor on call and work on her discharge, she states ok, thank you.   "

## 2023-11-19 NOTE — ASSESSMENT & PLAN NOTE
Presents with dizziness/vertigo   Scheduled for  tomorrow  Ddx: Pregnancy hormones, Vessel compression by baby, orthostatics, vestibular neuritis, BPPV (less likely), less likely intra-cranial process    Plan:   Check orthostatics (normal)  CTH (normal)  Lab workup (normal)  MRI ordered (normal)  Tele-neuro consulted  Symptomatic control with meclizine, anti-emetics, anti-histamines  Will likely improve following

## 2023-11-19 NOTE — CONSULTS
[facility]  [facility] Medicine  Consult Note    Patient Name: Amber S Toussaint  MRN: [id number]  Admission Date: 11/18/2023  Hospital Length of Stay: 0 days  Attending Physician: Yusuf Garcia III, MD   Primary Care Provider: Misa Maria MD           Patient information was obtained from patient, past medical records, and ER records.     Consults  Subjective:     Principal Problem: Dizziness    Chief Complaint:   Chief Complaint   Patient presents with    Dizziness     Patient reports constant dizziness and fuzzy feeling in the brain.  Was stumbling this morning and off balance around 7-8 pm last night States currently 39 weeks pregnant did loose mucus plug, and having blood when wipes. CVR rule out called in triage.         HPI: This is a 37-year-old female with a past medical history of Graves disease, 39 week gestation who presents with dizziness.    Hospital medicine is consulted for dizziness.    Patient presents to the ED for evaluation of dizziness that started the night prior to presentation.  She reports that she feels that the room is spinning, and is unable to walk steadily on her feet.  Additional symptoms include 1 episode of emesis.  Her dizziness is persistent, and is worse with standing up and walking.  She had 2 similar episodes over the last week, that resolved after 30-40 minutes. No hearing loss, weakness or numbness.     In the ED, the patient was hemodynamically stable.  Labs were remarkable for normocytic anemia (10.8).  UA showed trace leukocytes, 8 RBCs, 5 WBCs and rare bacteria.  CT head showed no acute process.  She was given 1 L of NS, 300 mL of LR, Zofran 4 mg IV, and meclizine 25 mg p.o..  Patient was started on  mL/hour.    Patient is extremely frustrated that she has been on the floor since 2:00 p.m. and has not been seen by a provider since. She reports that Meclizine was not effective. She denies nausea currently.     Interval History:  NAEON.  No new  issues.   Complaints about communication and care  All questions answered and updates on care given.       ROS:  Cardiac: Negative for chest pain or orthopnea   Pulmonary: Negative for dyspnea or wheezing.  +dizziness     Vitals:    11/19/23 0450 11/19/23 0455 11/19/23 0500 11/19/23 0505   BP: 110/79      Pulse: 69 72 71 72   Resp:       Temp:       TempSrc:       SpO2: 99% 99% 99% 100%   Weight:       Height:              Body mass index is 31.12 kg/m².      PHYSICAL EXAM:  GENERAL APPEARANCE: alert and cooperative, and no acute distress.  HEENT:     HEAD: NC/AT     EYES: PERRL, EOMI.  Vision is grossly intact. NO nystagmus.  NECK: Neck supple, non-tender without LAD, masses or thyromegaly.  CARDIAC: There is no peripheral edema, cyanosis or pallor.   LUNGS: Clear to auscultation and percussion without rales or wheezing  ABDOMEN: Non-distended. No guarding.  MSK: No joint erythema or tenderness.   EXTREMITIES: No significant deformity or joint abnormality. No edema.   NEUROLOGICAL: CN II-XII grossly intact.   SKIN: Skin normal color, texture and turgor with no lesions or eruptions.  PSYCHIATRIC: Oriented. No tangential speech. No Hyperactive features.  +appears anxious and agitated         No results found for this or any previous visit (from the past 24 hour(s)).    Microbiology Results (last 7 days)       ** No results found for the last 168 hours. **             Imaging Results              CT Head Without Contrast (Final result)  Result time 11/18/23 13:06:30      Final result by Ramu Sinclair MD (11/18/23 13:06:30)                   Impression:      No acute intracranial findings.      Electronically signed by: Ramu Sinclair  Date:    11/18/2023  Time:    13:06               Narrative:    EXAMINATION:  CT HEAD WITHOUT CONTRAST    CLINICAL HISTORY:  Headache, new or worsening, neuro deficit (Age 19-49y);    TECHNIQUE:  Low dose axial images were obtained through the head.  Coronal and sagittal  reformations were also performed. Contrast was not administered.    COMPARISON:  CT head performed 2021    FINDINGS:  Blood: No acute intracranial hemorrhage.    Parenchyma: No definite loss of gray-white differentiation to suggest acute or subacute transcortical infarct.    Ventricles/Extra-axial spaces: No abnormal extra-axial fluid collection. Basal cisterns are patent.    Vessels: Grossly unremarkable by unenhanced technique.    Orbits: Unremarkable.    Scalp: Unremarkable.    Skull: There are no depressed skull fractures or destructive bone lesions.    Sinuses and mastoids: Essentially clear.    Other findings: None                                    MRI Brain Without Contrast [[id number]] Resulted: 23 1535   Order Status: Completed Updated: 23   Narrative:     EXAMINATION:  MRI BRAIN WITHOUT CONTRAST    CLINICAL HISTORY:  Dizziness, non-specific;Dizziness in 39w pregnant female.;    TECHNIQUE:  Sagittal and axial T1, axial T2, axial FLAIR, axial gradient and axial diffusion imaging of the whole brain without contrast.    COMPARISON:  CT head 2023    FINDINGS:  Allowing for artifact from motion and technical factors there is no diffusion restriction to suggest acute or recent infarction.  Brain parenchyma is normal in signal and contour.  Ventricles normal in size without hydrocephalus.  No abnormal parenchymal susceptibility to suggest parenchymal hemorrhage.  Major intracranial skull base T2 flow voids are present.  Prominence of the pituitary in the sella likely related to gravid status.   Impression:       Unremarkable noncontrast MRI brain as detailed above specifically without evidence for acute infarction or hydrocephalus.      Electronically signed by: Chadwick Barbour DO  Date: 2023  Time: 15:35          Assessment/Plan:     * Dizziness  Presents with dizziness/vertigo   Scheduled for  tomorrow  Ddx: Pregnancy hormones, Vessel compression by baby, orthostatics,  vestibular neuritis, BPPV (less likely), less likely intra-cranial process    Plan:   Check orthostatics (normal)  CTH (normal)  Lab workup (normal)  MRI ordered (normal)  Tele-neuro consulted  Symptomatic control with meclizine, anti-emetics, anti-histamines  Will likely improve following      Iron deficiency anemia  Resume iron tablets.     Graves disease  Not currently on medications   Lab Results   Component Value Date    TSH 0.64 2023               VTE Risk Mitigation (From admission, onward)      None              Thank you for your consult.       Miguel Padgett MD  Department of Hospital Medicine   [facility] - Labor & Delivery

## 2023-11-19 NOTE — NURSING
David, house sup. And myself informed pt of reverse of discharge and that its ok for her to stay.

## 2023-11-19 NOTE — NURSING
Dr. Martínez informed of Pt's request and of Dr. Padgett's verbal order to discharge to home with labor precautions. States that its ok from an OB stand point to return at scheduled time for  in the morning.

## 2023-11-19 NOTE — NURSING
At  for assessment. Pt. Voicing concerns about feeling like her needs are not being taken care of. She appears to be very upset. States that she has been made to feel unimportant, there is a lack of communication in her plan of care & nothing is being done that she can't do at home. She states that no one has even collaborated a plan of care and no one knows what to do. Pt allowed to express her concerns, questions answered, instructed on plan of care, which includes notifying her doctor about her concerns, verbalized understanding.

## 2023-11-19 NOTE — NURSING
Dr. Garcia phoned with update on pt. Notified of reassuring FHT and pt moved to observation room on L&D unit.  Orders received for NST Q shift and for nightshift RN to do another NST before shift change.

## 2023-11-19 NOTE — NURSING
Dr. Padgett @  instructing pt on plan of care, verbalized understanding. He answered her questions and acknowledged her concerns. Moving forward with neurology input & recommendations.

## 2023-11-19 NOTE — CONSULTS
[facility] Labor [facility] Delivery  [facility] Medicine  Consult Note    Patient Name: Amber S Toussaint  MRN: [id number]  Admission Date: 11/18/2023  Hospital Length of Stay: 0 days  Attending Physician: Yusuf Garcia III, MD   Primary Care Provider: Misa Maria MD           Patient information was obtained from patient and ER records.     Inpatient consult to Hospitalist  Consult performed by: Saúl Poon MD  Consult ordered by: Yusuf Garcia III, MD        Subjective:     Principal Problem: <principal problem not specified>    Chief Complaint:   Chief Complaint   Patient presents with    Dizziness     Patient reports constant dizziness and fuzzy feeling in the brain.  Was stumbling this morning and off balance around 7-8 pm last night States currently 39 weeks pregnant did loose mucus plug, and having blood when wipes. CVR rule out called in triage.         HPI: This is a 37-year-old female with a past medical history of Graves disease, 39 week gestation who presents with dizziness.    Hospital medicine is consulted for dizziness.    Patient presents to the ED for evaluation of dizziness that started the night prior to presentation.  She reports that she feels that the room is spinning, and is unable to walk steadily on her feet.  Additional symptoms include 1 episode of emesis.  Her dizziness is persistent, and is worse with standing up and walking.  She had 2 similar episodes over the last week, that resolved after 30-40 minutes. No hearing loss, weakness or numbness.     In the ED, the patient was hemodynamically stable.  Labs were remarkable for normocytic anemia (10.8).  UA showed trace leukocytes, 8 RBCs, 5 WBCs and rare bacteria.  CT head showed no acute process.  She was given 1 L of NS, 300 mL of LR, Zofran 4 mg IV, and meclizine 25 mg p.o..  Patient was started on  mL/hour.    Patient is extremely frustrated that she has been on the floor since 2:00 p.m. and has not been seen by a provider since. She  reports that Meclizine was not effective. She denies nausea currently.     Past Medical History:   Diagnosis Date    Graves disease 2013    Iron deficiency anemia 2021       Past Surgical History:   Procedure Laterality Date    BREAST SURGERY      Cysts removed     SECTION      DIAGNOSTIC LAPAROSCOPY N/A 2021    Procedure: LAPAROSCOPY, DIAGNOSTIC, possible salpingostomy/ salpingectomy/  D& C;  Surgeon: Cheri Hunter MD;  Location: [facility] OR;  Service: OB/GYN;  Laterality: N/A;    DILATION AND CURETTAGE OF UTERUS  2021    Procedure: DILATION AND CURETTAGE, UTERUS;  Surgeon: Cheri Hunter MD;  Location: [facility] OR;  Service: OB/GYN;;    fibroid removal  ,     breast    HEMANGIOMA EXCISION      right thigh    LAPAROSCOPIC SALPINGECTOMY Right 2021    Procedure: SALPINGECTOMY, LAPAROSCOPIC;  Surgeon: Cheri Hunter MD;  Location: [facility];  Service: OB/GYN;  Laterality: Right;       Review of patient's allergies indicates:   Allergen Reactions    Vinyl ether Hives       No current facility-administered medications on file prior to encounter.     Current Outpatient Medications on File Prior to Encounter   Medication Sig    aspirin (ECOTRIN) 81 MG EC tablet Take 1 tablet (81 mg total) by mouth once daily.    ergocalciferol (ERGOCALCIFEROL) 50,000 unit Cap Take 50,000 Units by mouth once a week.    ferrous sulfate 325 (65 FE) MG EC tablet Take 1 tablet (325 mg total) by mouth once daily.    prenatal 105-iron-folic ac-dha 30 mg iron- 1.4 mg-300 mg Cmpk Take by mouth.     Family History       Problem Relation (Age of Onset)    Breast cancer Maternal Grandmother    Colon cancer Paternal Grandfather    Hypertension Mother, Father, Maternal Grandmother    Kidney failure Mother    Pacemaker/defibrilator Mother    Throat cancer Father          Tobacco Use    Smoking status: Never    Smokeless tobacco: Never   Substance and Sexual Activity    Alcohol use: Not Currently     Comment:  occasional    Drug use: No    Sexual activity: Yes     Partners: Male     Review of Systems   Constitutional: Negative.    HENT: Negative.     Eyes: Negative.    Respiratory: Negative.     Cardiovascular: Negative.    Gastrointestinal: Negative.    Endocrine: Negative.    Genitourinary: Negative.    Musculoskeletal:  Positive for gait problem.   Skin: Negative.    Allergic/Immunologic: Negative.    Neurological:  Positive for dizziness. Negative for facial asymmetry, speech difficulty and numbness.   Psychiatric/Behavioral: Negative.       Objective:     Vital Signs (Most Recent):  Temp: 98.6 °F (37 °C) (11/18/23 1645)  Pulse: 75 (11/18/23 1920)  Resp: 18 (11/18/23 1700)  BP: (!) 105/57 (11/18/23 1920)  SpO2: 98 % (11/18/23 1920) Vital Signs (24h Range):  Temp:  [97.9 °F (36.6 °C)-98.6 °F (37 °C)] 98.6 °F (37 °C)  Pulse:  [72-88] 75  Resp:  [14-20] 18  SpO2:  [97 %-100 %] 98 %  BP: (103-136)/(57-86) 105/57     Weight: 84.8 kg (187 lb)  Body mass index is 31.12 kg/m².     Physical Exam  Vitals and nursing note reviewed.   Constitutional:       General: She is not in acute distress.     Appearance: Normal appearance. She is not ill-appearing.   HENT:      Head: Normocephalic and atraumatic.      Nose: Nose normal.      Mouth/Throat:      Mouth: Mucous membranes are moist.   Eyes:      Extraocular Movements: Extraocular movements intact.   Cardiovascular:      Rate and Rhythm: Normal rate.      Pulses: Normal pulses.      Heart sounds: No murmur heard.  Pulmonary:      Effort: Pulmonary effort is normal. No respiratory distress.   Abdominal:      General: Abdomen is flat. There is distension.      Palpations: Abdomen is soft.      Tenderness: There is no abdominal tenderness.   Musculoskeletal:      Right lower leg: No edema.      Left lower leg: No edema.   Skin:     General: Skin is warm.      Capillary Refill: Capillary refill takes less than 2 seconds.   Neurological:      Mental Status: She is alert.       Comments: Negative romberg test. Negative Emeigh-Hallpike. Unsteady gait. Able to ambulate with assistance (fear of falling).   No focal weakness appreciated. CN II-XII intact.    Psychiatric:         Mood and Affect: Mood normal.          Significant Labs: All pertinent labs within the past 24 hours have been reviewed.    Significant Imaging: I have reviewed all pertinent imaging results/findings within the past 24 hours.  Assessment/Plan:     Dizziness  Presents with dizziness/vertigo   Ddx: vestibular neuritis, BPPV (less likely), less likely intra-cranial process. Less likely intra-cranial process.     Plan:   Check orthostatic vital signs   Continue IVF   Symptomatic control with anti-emetics, anti-histamines. Benzos as contingency   If persistent dizziness till the morning, I recommend consulting neurology       Iron deficiency anemia  Resume iron tablets.     Graves disease  Not currently on medications   Lab Results   Component Value Date    TSH 0.64 08/14/2023               VTE Risk Mitigation (From admission, onward)      None                Thank you for your consult. Hospital medicine will follow-up with patient. Please contact us if you have any additional questions.    Saúl Poon MD  Department of Hospital Medicine   [facility] - Labor & Delivery

## 2023-11-19 NOTE — NURSING
Dr. DEQUAN Maria informed of pt concerns as above and the she states that she Is thinking about not having her  here and that pt. was reassured about the safety of having her surgery here but also that she has the right to go elsewhere if she feels unsafe and uncomfortable.

## 2023-11-19 NOTE — NURSING
House supervisor informed of pt having discharge orders and discharge note from Dr. Padgett. He states that he will talk to Dr. Padgett and get back with me.

## 2023-11-19 NOTE — HPI
This is a 37-year-old female with a past medical history of Graves disease, 39 week gestation who presents with dizziness.    Hospital medicine is consulted for dizziness.    Patient presents to the ED for evaluation of dizziness that started the night prior to presentation.  She reports that she feels that the room is spinning, and is unable to walk steadily on her feet.  Additional symptoms include 1 episode of emesis.  Her dizziness is persistent, and is worse with standing up and walking.  She had 2 similar episodes over the last week, that resolved after 30-40 minutes. No hearing loss, weakness or numbness.     In the ED, the patient was hemodynamically stable.  Labs were remarkable for normocytic anemia (10.8).  UA showed trace leukocytes, 8 RBCs, 5 WBCs and rare bacteria.  CT head showed no acute process.  She was given 1 L of NS, 300 mL of LR, Zofran 4 mg IV, and meclizine 25 mg p.o..  Patient was started on  mL/hour.    Patient is extremely frustrated that she has been on the floor since 2:00 p.m. and has not been seen by a provider since. She reports that Meclizine was not effective. She denies nausea currently.

## 2023-11-19 NOTE — PROGRESS NOTES
Update:  Patient's CTH and MRI negative for any acute finding. Suspect dizziness is related to hormonal changes and will resolve when she has her baby tomorrow.     Symptoms have improved somewhat. No nystagmus on exam. Vitals and labs wnl.     Patient would like to go home and return in the morning for her scheduled , will sign off. Please re consult HM if needed.         Miguel Padgett MD  Board-Certified Internal Medicine Attending  Section Head of [facility] Medicine

## 2023-11-19 NOTE — HOSPITAL COURSE
Patient presented with concerns of new onset dizziness. CTH unremarkable. Likely hormonal changes during pregnancy. Meclazine ordered, along with PRN benadryl and zofran. Improved somewhat, but still nervous with ambulation. Will consult tele-Neurology for any recommendations. Recommend MRI brain w/o, they think dizziness without any other manifestations would be unlikely to be venous sinus thrombosis.

## 2023-11-20 PROBLEM — Z98.891 STATUS POST REPEAT LOW TRANSVERSE CESAREAN SECTION: Status: ACTIVE | Noted: 2023-11-20

## 2023-11-20 LAB
ABO + RH BLD: NORMAL
BLD GP AB SCN CELLS X3 SERPL QL: NORMAL
POCT GLUCOSE: 79 MG/DL (ref 70–110)
SPECIMEN OUTDATE: NORMAL

## 2023-11-20 PROCEDURE — 37000008 HC ANESTHESIA 1ST 15 MINUTES: Performed by: OBSTETRICS & GYNECOLOGY

## 2023-11-20 PROCEDURE — 86901 BLOOD TYPING SEROLOGIC RH(D): CPT | Performed by: OBSTETRICS & GYNECOLOGY

## 2023-11-20 PROCEDURE — 59514 CESAREAN DELIVERY ONLY: CPT | Mod: QY,ANES,, | Performed by: ANESTHESIOLOGY

## 2023-11-20 PROCEDURE — 59514 PR CESAREAN DELIVERY ONLY: ICD-10-PCS | Mod: GB,,, | Performed by: OBSTETRICS & GYNECOLOGY

## 2023-11-20 PROCEDURE — 59514 PRA REAN DELIVERY ONLY: ICD-10-PCS | Mod: QY,ANES,, | Performed by: ANESTHESIOLOGY

## 2023-11-20 PROCEDURE — 59514 CESAREAN DELIVERY ONLY: CPT | Mod: GB,,, | Performed by: OBSTETRICS & GYNECOLOGY

## 2023-11-20 PROCEDURE — 25000003 PHARM REV CODE 250: Performed by: OBSTETRICS & GYNECOLOGY

## 2023-11-20 PROCEDURE — 71000033 HC RECOVERY, INTIAL HOUR: Performed by: OBSTETRICS & GYNECOLOGY

## 2023-11-20 PROCEDURE — 37000009 HC ANESTHESIA EA ADD 15 MINS: Performed by: OBSTETRICS & GYNECOLOGY

## 2023-11-20 PROCEDURE — 71000039 HC RECOVERY, EACH ADD'L HOUR: Performed by: OBSTETRICS & GYNECOLOGY

## 2023-11-20 PROCEDURE — 59514 CESAREAN DELIVERY ONLY: CPT | Mod: QX,CRNA,, | Performed by: NURSE ANESTHETIST, CERTIFIED REGISTERED

## 2023-11-20 PROCEDURE — 63600175 PHARM REV CODE 636 W HCPCS: Performed by: ANESTHESIOLOGY

## 2023-11-20 PROCEDURE — 51702 INSERT TEMP BLADDER CATH: CPT

## 2023-11-20 PROCEDURE — 36004724 HC OB OR TIME LEV III - 1ST 15 MIN: Performed by: OBSTETRICS & GYNECOLOGY

## 2023-11-20 PROCEDURE — 27200688 HC TRAY, SPINAL-HYPER/ ISOBARIC: Performed by: ANESTHESIOLOGY

## 2023-11-20 PROCEDURE — 86592 SYPHILIS TEST NON-TREP QUAL: CPT | Performed by: OBSTETRICS & GYNECOLOGY

## 2023-11-20 PROCEDURE — 63600175 PHARM REV CODE 636 W HCPCS: Performed by: NURSE ANESTHETIST, CERTIFIED REGISTERED

## 2023-11-20 PROCEDURE — 25000003 PHARM REV CODE 250: Performed by: ANESTHESIOLOGY

## 2023-11-20 PROCEDURE — 63600175 PHARM REV CODE 636 W HCPCS: Performed by: OBSTETRICS & GYNECOLOGY

## 2023-11-20 PROCEDURE — 59514 CESAREAN DELIVERY ONLY: CPT | Mod: AS,GB,, | Performed by: PHYSICIAN ASSISTANT

## 2023-11-20 PROCEDURE — 59514 PRA REAN DELIVERY ONLY: ICD-10-PCS | Mod: QX,CRNA,, | Performed by: NURSE ANESTHETIST, CERTIFIED REGISTERED

## 2023-11-20 PROCEDURE — 11000001 HC ACUTE MED/SURG PRIVATE ROOM

## 2023-11-20 PROCEDURE — 59514 PR CESAREAN DELIVERY ONLY: ICD-10-PCS | Mod: AS,GB,, | Performed by: PHYSICIAN ASSISTANT

## 2023-11-20 PROCEDURE — 36004725 HC OB OR TIME LEV III - EA ADD 15 MIN: Performed by: OBSTETRICS & GYNECOLOGY

## 2023-11-20 RX ORDER — METHYLERGONOVINE MALEATE 0.2 MG/ML
200 INJECTION INTRAVENOUS
Status: DISCONTINUED | OUTPATIENT
Start: 2023-11-20 | End: 2023-11-20

## 2023-11-20 RX ORDER — OXYTOCIN/RINGER'S LACTATE 30/500 ML
95 PLASTIC BAG, INJECTION (ML) INTRAVENOUS ONCE
Status: DISCONTINUED | OUTPATIENT
Start: 2023-11-20 | End: 2023-11-20

## 2023-11-20 RX ORDER — ACETAMINOPHEN 325 MG/1
650 TABLET ORAL EVERY 6 HOURS
Status: COMPLETED | OUTPATIENT
Start: 2023-11-20 | End: 2023-11-21

## 2023-11-20 RX ORDER — FAMOTIDINE 10 MG/ML
20 INJECTION INTRAVENOUS
Status: DISCONTINUED | OUTPATIENT
Start: 2023-11-20 | End: 2023-11-20

## 2023-11-20 RX ORDER — KETOROLAC TROMETHAMINE 30 MG/ML
30 INJECTION, SOLUTION INTRAMUSCULAR; INTRAVENOUS EVERY 6 HOURS
Status: COMPLETED | OUTPATIENT
Start: 2023-11-20 | End: 2023-11-21

## 2023-11-20 RX ORDER — PRENATAL WITH FERROUS FUM AND FOLIC ACID 3080; 920; 120; 400; 22; 1.84; 3; 20; 10; 1; 12; 200; 27; 25; 2 [IU]/1; [IU]/1; MG/1; [IU]/1; MG/1; MG/1; MG/1; MG/1; MG/1; MG/1; UG/1; MG/1; MG/1; MG/1; MG/1
1 TABLET ORAL DAILY
Status: DISCONTINUED | OUTPATIENT
Start: 2023-11-20 | End: 2023-11-22 | Stop reason: HOSPADM

## 2023-11-20 RX ORDER — DIPHENHYDRAMINE HCL 25 MG
25 CAPSULE ORAL EVERY 4 HOURS PRN
Status: DISCONTINUED | OUTPATIENT
Start: 2023-11-20 | End: 2023-11-22 | Stop reason: HOSPADM

## 2023-11-20 RX ORDER — MISOPROSTOL 200 UG/1
800 TABLET ORAL
Status: DISCONTINUED | OUTPATIENT
Start: 2023-11-20 | End: 2023-11-20

## 2023-11-20 RX ORDER — OXYTOCIN/RINGER'S LACTATE 30/500 ML
PLASTIC BAG, INJECTION (ML) INTRAVENOUS CONTINUOUS PRN
Status: DISCONTINUED | OUTPATIENT
Start: 2023-11-20 | End: 2023-11-20

## 2023-11-20 RX ORDER — DEXTROSE, SODIUM CHLORIDE, SODIUM LACTATE, POTASSIUM CHLORIDE, AND CALCIUM CHLORIDE 5; .6; .31; .03; .02 G/100ML; G/100ML; G/100ML; G/100ML; G/100ML
INJECTION, SOLUTION INTRAVENOUS CONTINUOUS PRN
Status: DISCONTINUED | OUTPATIENT
Start: 2023-11-20 | End: 2023-11-20

## 2023-11-20 RX ORDER — IBUPROFEN 600 MG/1
600 TABLET ORAL EVERY 6 HOURS
Status: DISCONTINUED | OUTPATIENT
Start: 2023-11-21 | End: 2023-11-22 | Stop reason: HOSPADM

## 2023-11-20 RX ORDER — OXYTOCIN/RINGER'S LACTATE 30/500 ML
334 PLASTIC BAG, INJECTION (ML) INTRAVENOUS ONCE
Status: DISCONTINUED | OUTPATIENT
Start: 2023-11-20 | End: 2023-11-20

## 2023-11-20 RX ORDER — ADHESIVE BANDAGE
30 BANDAGE TOPICAL 2 TIMES DAILY PRN
Status: DISCONTINUED | OUTPATIENT
Start: 2023-11-21 | End: 2023-11-22 | Stop reason: HOSPADM

## 2023-11-20 RX ORDER — METHYLERGONOVINE MALEATE 0.2 MG/ML
200 INJECTION INTRAVENOUS ONCE AS NEEDED
Status: DISCONTINUED | OUTPATIENT
Start: 2023-11-20 | End: 2023-11-22 | Stop reason: HOSPADM

## 2023-11-20 RX ORDER — SODIUM CITRATE AND CITRIC ACID MONOHYDRATE 334; 500 MG/5ML; MG/5ML
30 SOLUTION ORAL
Status: DISCONTINUED | OUTPATIENT
Start: 2023-11-20 | End: 2023-11-20

## 2023-11-20 RX ORDER — LANOLIN ALCOHOL/MO/W.PET/CERES
1 CREAM (GRAM) TOPICAL DAILY
Status: DISCONTINUED | OUTPATIENT
Start: 2023-11-20 | End: 2023-11-22 | Stop reason: HOSPADM

## 2023-11-20 RX ORDER — SIMETHICONE 80 MG
1 TABLET,CHEWABLE ORAL EVERY 6 HOURS PRN
Status: DISCONTINUED | OUTPATIENT
Start: 2023-11-20 | End: 2023-11-22 | Stop reason: HOSPADM

## 2023-11-20 RX ORDER — OXYTOCIN 10 [USP'U]/ML
INJECTION, SOLUTION INTRAMUSCULAR; INTRAVENOUS
Status: DISCONTINUED | OUTPATIENT
Start: 2023-11-20 | End: 2023-11-20

## 2023-11-20 RX ORDER — OXYTOCIN/RINGER'S LACTATE 30/500 ML
95 PLASTIC BAG, INJECTION (ML) INTRAVENOUS ONCE AS NEEDED
Status: DISCONTINUED | OUTPATIENT
Start: 2023-11-20 | End: 2023-11-22 | Stop reason: HOSPADM

## 2023-11-20 RX ORDER — OXYTOCIN/RINGER'S LACTATE 30/500 ML
95 PLASTIC BAG, INJECTION (ML) INTRAVENOUS ONCE
Status: DISCONTINUED | OUTPATIENT
Start: 2023-11-20 | End: 2023-11-22 | Stop reason: HOSPADM

## 2023-11-20 RX ORDER — ONDANSETRON HYDROCHLORIDE 2 MG/ML
INJECTION, SOLUTION INTRAMUSCULAR; INTRAVENOUS
Status: DISCONTINUED | OUTPATIENT
Start: 2023-11-20 | End: 2023-11-20

## 2023-11-20 RX ORDER — OXYCODONE HYDROCHLORIDE 5 MG/1
10 TABLET ORAL EVERY 4 HOURS PRN
Status: ACTIVE | OUTPATIENT
Start: 2023-11-20 | End: 2023-11-21

## 2023-11-20 RX ORDER — OXYTOCIN/RINGER'S LACTATE 30/500 ML
334 PLASTIC BAG, INJECTION (ML) INTRAVENOUS ONCE AS NEEDED
Status: DISCONTINUED | OUTPATIENT
Start: 2023-11-20 | End: 2023-11-22 | Stop reason: HOSPADM

## 2023-11-20 RX ORDER — DEXAMETHASONE SODIUM PHOSPHATE 4 MG/ML
INJECTION, SOLUTION INTRA-ARTICULAR; INTRALESIONAL; INTRAMUSCULAR; INTRAVENOUS; SOFT TISSUE
Status: DISCONTINUED | OUTPATIENT
Start: 2023-11-20 | End: 2023-11-20

## 2023-11-20 RX ORDER — PHENYLEPHRINE HYDROCHLORIDE 10 MG/ML
INJECTION INTRAVENOUS
Status: DISCONTINUED | OUTPATIENT
Start: 2023-11-20 | End: 2023-11-20

## 2023-11-20 RX ORDER — OXYTOCIN 10 [USP'U]/ML
10 INJECTION, SOLUTION INTRAMUSCULAR; INTRAVENOUS ONCE AS NEEDED
Status: DISCONTINUED | OUTPATIENT
Start: 2023-11-20 | End: 2023-11-22 | Stop reason: HOSPADM

## 2023-11-20 RX ORDER — BISACODYL 10 MG
10 SUPPOSITORY, RECTAL RECTAL ONCE AS NEEDED
Status: DISCONTINUED | OUTPATIENT
Start: 2023-11-20 | End: 2023-11-22 | Stop reason: HOSPADM

## 2023-11-20 RX ORDER — SODIUM CHLORIDE, SODIUM LACTATE, POTASSIUM CHLORIDE, CALCIUM CHLORIDE 600; 310; 30; 20 MG/100ML; MG/100ML; MG/100ML; MG/100ML
INJECTION, SOLUTION INTRAVENOUS CONTINUOUS
Status: DISCONTINUED | OUTPATIENT
Start: 2023-11-20 | End: 2023-11-20

## 2023-11-20 RX ORDER — ONDANSETRON 2 MG/ML
4 INJECTION INTRAMUSCULAR; INTRAVENOUS EVERY 12 HOURS PRN
Status: DISCONTINUED | OUTPATIENT
Start: 2023-11-20 | End: 2023-11-22 | Stop reason: HOSPADM

## 2023-11-20 RX ORDER — AMOXICILLIN 250 MG
1 CAPSULE ORAL NIGHTLY PRN
Status: DISCONTINUED | OUTPATIENT
Start: 2023-11-20 | End: 2023-11-22 | Stop reason: HOSPADM

## 2023-11-20 RX ORDER — HYDROCODONE BITARTRATE AND ACETAMINOPHEN 10; 325 MG/1; MG/1
1 TABLET ORAL EVERY 4 HOURS PRN
Status: DISCONTINUED | OUTPATIENT
Start: 2023-11-21 | End: 2023-11-22 | Stop reason: HOSPADM

## 2023-11-20 RX ORDER — TRANEXAMIC ACID 10 MG/ML
1000 INJECTION, SOLUTION INTRAVENOUS EVERY 30 MIN PRN
Status: DISCONTINUED | OUTPATIENT
Start: 2023-11-20 | End: 2023-11-22 | Stop reason: HOSPADM

## 2023-11-20 RX ORDER — MUPIROCIN 20 MG/G
OINTMENT TOPICAL 2 TIMES DAILY
Status: DISCONTINUED | OUTPATIENT
Start: 2023-11-20 | End: 2023-11-22 | Stop reason: HOSPADM

## 2023-11-20 RX ORDER — DIPHENHYDRAMINE HYDROCHLORIDE 50 MG/ML
12.5 INJECTION INTRAMUSCULAR; INTRAVENOUS NIGHTLY PRN
Status: DISCONTINUED | OUTPATIENT
Start: 2023-11-20 | End: 2023-11-22 | Stop reason: HOSPADM

## 2023-11-20 RX ORDER — ONDANSETRON 8 MG/1
8 TABLET, ORALLY DISINTEGRATING ORAL EVERY 8 HOURS PRN
Status: DISCONTINUED | OUTPATIENT
Start: 2023-11-20 | End: 2023-11-20 | Stop reason: SDUPTHER

## 2023-11-20 RX ORDER — MUPIROCIN 20 MG/G
OINTMENT TOPICAL
Status: DISCONTINUED | OUTPATIENT
Start: 2023-11-20 | End: 2023-11-20

## 2023-11-20 RX ORDER — MORPHINE SULFATE 0.5 MG/ML
INJECTION, SOLUTION EPIDURAL; INTRATHECAL; INTRAVENOUS
Status: DISCONTINUED | OUTPATIENT
Start: 2023-11-20 | End: 2023-11-20

## 2023-11-20 RX ORDER — CEFAZOLIN SODIUM 2 G/50ML
2 SOLUTION INTRAVENOUS
Status: DISCONTINUED | OUTPATIENT
Start: 2023-11-20 | End: 2023-11-20

## 2023-11-20 RX ORDER — PROCHLORPERAZINE EDISYLATE 5 MG/ML
5 INJECTION INTRAMUSCULAR; INTRAVENOUS EVERY 6 HOURS PRN
Status: DISCONTINUED | OUTPATIENT
Start: 2023-11-20 | End: 2023-11-22 | Stop reason: HOSPADM

## 2023-11-20 RX ORDER — DIPHENOXYLATE HYDROCHLORIDE AND ATROPINE SULFATE 2.5; .025 MG/1; MG/1
2 TABLET ORAL EVERY 6 HOURS PRN
Status: DISCONTINUED | OUTPATIENT
Start: 2023-11-20 | End: 2023-11-22 | Stop reason: HOSPADM

## 2023-11-20 RX ORDER — DIPHENHYDRAMINE HYDROCHLORIDE 50 MG/ML
12.5 INJECTION INTRAMUSCULAR; INTRAVENOUS
Status: DISCONTINUED | OUTPATIENT
Start: 2023-11-20 | End: 2023-11-22 | Stop reason: HOSPADM

## 2023-11-20 RX ORDER — MISOPROSTOL 200 UG/1
800 TABLET ORAL ONCE AS NEEDED
Status: DISCONTINUED | OUTPATIENT
Start: 2023-11-20 | End: 2023-11-22 | Stop reason: HOSPADM

## 2023-11-20 RX ORDER — METOCLOPRAMIDE HYDROCHLORIDE 5 MG/ML
INJECTION INTRAMUSCULAR; INTRAVENOUS
Status: DISCONTINUED | OUTPATIENT
Start: 2023-11-20 | End: 2023-11-20

## 2023-11-20 RX ORDER — SODIUM CHLORIDE 0.9 % (FLUSH) 0.9 %
10 SYRINGE (ML) INJECTION
Status: DISCONTINUED | OUTPATIENT
Start: 2023-11-20 | End: 2023-11-22 | Stop reason: HOSPADM

## 2023-11-20 RX ORDER — CARBOPROST TROMETHAMINE 250 UG/ML
250 INJECTION, SOLUTION INTRAMUSCULAR
Status: DISCONTINUED | OUTPATIENT
Start: 2023-11-20 | End: 2023-11-20

## 2023-11-20 RX ORDER — HYDROCORTISONE 25 MG/G
CREAM TOPICAL 3 TIMES DAILY PRN
Status: DISCONTINUED | OUTPATIENT
Start: 2023-11-20 | End: 2023-11-22 | Stop reason: HOSPADM

## 2023-11-20 RX ORDER — FENTANYL CITRATE 50 UG/ML
INJECTION, SOLUTION INTRAMUSCULAR; INTRAVENOUS
Status: DISCONTINUED | OUTPATIENT
Start: 2023-11-20 | End: 2023-11-20

## 2023-11-20 RX ORDER — BUPIVACAINE HYDROCHLORIDE 7.5 MG/ML
INJECTION, SOLUTION EPIDURAL; RETROBULBAR
Status: COMPLETED | OUTPATIENT
Start: 2023-11-20 | End: 2023-11-20

## 2023-11-20 RX ORDER — OXYCODONE HYDROCHLORIDE 5 MG/1
5 TABLET ORAL EVERY 4 HOURS PRN
Status: ACTIVE | OUTPATIENT
Start: 2023-11-20 | End: 2023-11-21

## 2023-11-20 RX ORDER — CARBOPROST TROMETHAMINE 250 UG/ML
250 INJECTION, SOLUTION INTRAMUSCULAR
Status: DISCONTINUED | OUTPATIENT
Start: 2023-11-20 | End: 2023-11-22 | Stop reason: HOSPADM

## 2023-11-20 RX ORDER — HYDROCODONE BITARTRATE AND ACETAMINOPHEN 5; 325 MG/1; MG/1
1 TABLET ORAL EVERY 4 HOURS PRN
Status: DISCONTINUED | OUTPATIENT
Start: 2023-11-21 | End: 2023-11-22 | Stop reason: HOSPADM

## 2023-11-20 RX ORDER — DOCUSATE SODIUM 100 MG/1
200 CAPSULE, LIQUID FILLED ORAL 2 TIMES DAILY
Status: DISCONTINUED | OUTPATIENT
Start: 2023-11-20 | End: 2023-11-22 | Stop reason: HOSPADM

## 2023-11-20 RX ADMIN — SODIUM CHLORIDE, POTASSIUM CHLORIDE, SODIUM LACTATE AND CALCIUM CHLORIDE: 600; 310; 30; 20 INJECTION, SOLUTION INTRAVENOUS at 10:11

## 2023-11-20 RX ADMIN — MUPIROCIN: 20 OINTMENT TOPICAL at 09:11

## 2023-11-20 RX ADMIN — FENTANYL CITRATE 10 MCG: 0.05 INJECTION, SOLUTION INTRAMUSCULAR; INTRAVENOUS at 11:11

## 2023-11-20 RX ADMIN — Medication 95 MILLI-UNITS/MIN: at 01:11

## 2023-11-20 RX ADMIN — SODIUM CHLORIDE, POTASSIUM CHLORIDE, SODIUM LACTATE AND CALCIUM CHLORIDE 1000 ML: 600; 310; 30; 20 INJECTION, SOLUTION INTRAVENOUS at 09:11

## 2023-11-20 RX ADMIN — CEFAZOLIN SODIUM 2 G: 2 SOLUTION INTRAVENOUS at 11:11

## 2023-11-20 RX ADMIN — FAMOTIDINE 20 MG: 10 INJECTION INTRAVENOUS at 11:11

## 2023-11-20 RX ADMIN — PHENYLEPHRINE HYDROCHLORIDE 100 MCG: 10 INJECTION INTRAVENOUS at 11:11

## 2023-11-20 RX ADMIN — ACETAMINOPHEN 650 MG: 325 TABLET ORAL at 06:11

## 2023-11-20 RX ADMIN — FENTANYL CITRATE 40 MCG: 50 INJECTION, SOLUTION INTRAMUSCULAR; INTRAVENOUS at 12:11

## 2023-11-20 RX ADMIN — DEXTROSE, SODIUM CHLORIDE, SODIUM LACTATE, POTASSIUM CHLORIDE, AND CALCIUM CHLORIDE: 5; .6; .31; .03; .02 INJECTION, SOLUTION INTRAVENOUS at 11:11

## 2023-11-20 RX ADMIN — DOCUSATE SODIUM 200 MG: 100 CAPSULE, LIQUID FILLED ORAL at 09:11

## 2023-11-20 RX ADMIN — KETOROLAC TROMETHAMINE 30 MG: 30 INJECTION, SOLUTION INTRAMUSCULAR; INTRAVENOUS at 06:11

## 2023-11-20 RX ADMIN — FENTANYL CITRATE 50 MCG: 50 INJECTION, SOLUTION INTRAMUSCULAR; INTRAVENOUS at 12:11

## 2023-11-20 RX ADMIN — DEXAMETHASONE SODIUM PHOSPHATE 4 MG: 4 INJECTION, SOLUTION INTRA-ARTICULAR; INTRALESIONAL; INTRAMUSCULAR; INTRAVENOUS; SOFT TISSUE at 12:11

## 2023-11-20 RX ADMIN — PHENYLEPHRINE HYDROCHLORIDE 100 MCG: 10 INJECTION INTRAVENOUS at 12:11

## 2023-11-20 RX ADMIN — MORPHINE SULFATE 0.1 MG: 0.5 INJECTION, SOLUTION EPIDURAL; INTRATHECAL; INTRAVENOUS at 11:11

## 2023-11-20 RX ADMIN — Medication 334 MILLI-UNITS/MIN: at 12:11

## 2023-11-20 RX ADMIN — ONDANSETRON 4 MG: 2 INJECTION INTRAMUSCULAR; INTRAVENOUS at 11:11

## 2023-11-20 RX ADMIN — Medication 334 ML/HR: at 12:11

## 2023-11-20 RX ADMIN — BUPIVACAINE HYDROCHLORIDE 1.6 ML: 7.5 INJECTION, SOLUTION EPIDURAL; RETROBULBAR at 11:11

## 2023-11-20 RX ADMIN — OXYTOCIN 10 UNITS: 10 INJECTION, SOLUTION INTRAMUSCULAR; INTRAVENOUS at 12:11

## 2023-11-20 RX ADMIN — METOCLOPRAMIDE 5 MG: 5 INJECTION, SOLUTION INTRAMUSCULAR; INTRAVENOUS at 12:11

## 2023-11-20 RX ADMIN — MUPIROCIN: 20 OINTMENT TOPICAL at 10:11

## 2023-11-20 NOTE — HPI
Amber S Toussaint is a 37 y.o.  female with IUP at 39w0d weeks gestation who is admitted for scheduled  Section secondary to history of C/S.     This IUP is complicated by history of C/S, AMA, hypothyroidism, previous child with VSD/WPW, GDM diet controlled, and dizzines .  Patient reports contractions, denies vaginal bleeding, denies LOF.   Fetal Movement: normal.

## 2023-11-20 NOTE — PLAN OF CARE
Problem:  Fall Injury Risk  Goal: Absence of Fall, Infant Drop and Related Injury  Outcome: Ongoing, Progressing     Problem: Adult Inpatient Plan of Care  Goal: Plan of Care Review  Outcome: Ongoing, Progressing  Goal: Patient-Specific Goal (Individualized)  Outcome: Ongoing, Progressing  Goal: Absence of Hospital-Acquired Illness or Injury  Outcome: Ongoing, Progressing  Goal: Optimal Comfort and Wellbeing  Outcome: Ongoing, Progressing  Goal: Readiness for Transition of Care  Outcome: Ongoing, Progressing     Problem: Infection  Goal: Absence of Infection Signs and Symptoms  Outcome: Ongoing, Progressing     Problem: Breastfeeding  Goal: Effective Breastfeeding  Outcome: Ongoing, Progressing     Problem: Adjustment to Role Transition (Postpartum  Delivery)  Goal: Successful Maternal Role Transition  Outcome: Ongoing, Progressing     Problem: Bleeding (Postpartum  Delivery)  Goal: Hemostasis  Outcome: Ongoing, Progressing     Problem: Infection (Postpartum  Delivery)  Goal: Absence of Infection Signs and Symptoms  Outcome: Ongoing, Progressing     Problem: Pain (Postpartum  Delivery)  Goal: Acceptable Pain Control  Outcome: Ongoing, Progressing     Problem: Postoperative Nausea and Vomiting (Postpartum  Delivery)  Goal: Nausea and Vomiting Relief  Outcome: Ongoing, Progressing     Problem: Postoperative Urinary Retention (Postpartum  Delivery)  Goal: Effective Urinary Elimination  Outcome: Ongoing, Progressing

## 2023-11-20 NOTE — ASSESSMENT & PLAN NOTE
- Admit to Labor & Delivery  - Ancef OCTOR  - Place IV and start IVFs  - Type & Screen and CBC ordered STAT  - COVID ordered STAT  - Continuous fetal monitoring  - Notify anesthesia of patient arrival  - Postpartum Hemorrhage risk: Medium

## 2023-11-20 NOTE — NURSING
Pt sitting up in bed. Alert and oriented. Speech clear w/ appropriate response. Denies pain. Continues to c/o constant vertigo, worsening when ambulating. Pt states able to ambulate to bathroom w/o much difficulty.  Instructed to call for nursing help. Bedside commode offered.  No other complaints at this time. Verbalized understanding of npo after MN . Hibiclens shower tonight and tomorrow.  No visitors at this time.placed on monitor for NST

## 2023-11-20 NOTE — SUBJECTIVE & OBJECTIVE
OB History    Para Term  AB Living   4 1 1 0 2 1   SAB IAB Ectopic Multiple Live Births   1 0 1 0 1      # Outcome Date GA Lbr Misbah/2nd Weight Sex Delivery Anes PTL Lv   4 Current            3 SAB 2022 12w0d    SAB      2 Ectopic 21     ECTOPIC      1 Term 04/17/15 38w0d  2.92 kg (6 lb 7 oz) F CS-LTranv EPI N STEVEN      Complications: VSD (ventricular septal defect)      Name: Lamin     Past Medical History:   Diagnosis Date    Graves disease     Iron deficiency anemia 2021     Past Surgical History:   Procedure Laterality Date    BREAST SURGERY      Cysts removed     SECTION      DIAGNOSTIC LAPAROSCOPY N/A 2021    Procedure: LAPAROSCOPY, DIAGNOSTIC, possible salpingostomy/ salpingectomy/  D& C;  Surgeon: hCeri Hunter MD;  Location: [facility];  Service: OB/GYN;  Laterality: N/A;    DILATION AND CURETTAGE OF UTERUS  2021    Procedure: DILATION AND CURETTAGE, UTERUS;  Surgeon: Cheri Hunter MD;  Location: [facility] OR;  Service: OB/GYN;;    fibroid removal  ,     breast    HEMANGIOMA EXCISION      right thigh    LAPAROSCOPIC SALPINGECTOMY Right 2021    Procedure: SALPINGECTOMY, LAPAROSCOPIC;  Surgeon: Cheri Hunter MD;  Location: [facility] OR;  Service: OB/GYN;  Laterality: Right;       PTA Medications   Medication Sig    aspirin (ECOTRIN) 81 MG EC tablet Take 1 tablet (81 mg total) by mouth once daily.    ergocalciferol (ERGOCALCIFEROL) 50,000 unit Cap Take 50,000 Units by mouth once a week.    ferrous sulfate 325 (65 FE) MG EC tablet Take 1 tablet (325 mg total) by mouth once daily.    prenatal 105-iron-folic ac-dha 30 mg iron- 1.4 mg-300 mg Cmpk Take by mouth.       Review of patient's allergies indicates:   Allergen Reactions    Vinyl ether Hives        Family History       Problem Relation (Age of Onset)    Breast cancer Maternal Grandmother    Colon cancer Paternal Grandfather    Hypertension Mother, Father, Maternal Grandmother    Kidney  "failure Mother    Pacemaker/defibrilator Mother    Throat cancer Father          Tobacco Use    Smoking status: Never    Smokeless tobacco: Never   Substance and Sexual Activity    Alcohol use: Not Currently     Comment: occasional    Drug use: No    Sexual activity: Yes     Partners: Male     Review of Systems   Constitutional:  Negative for chills and fever.   Eyes:  Negative for visual disturbance.   Respiratory:  Negative for cough and wheezing.    Cardiovascular:  Negative for chest pain and palpitations.   Gastrointestinal:  Negative for abdominal pain, nausea and vomiting.   Genitourinary:  Negative for dysuria, frequency, hematuria, pelvic pain, vaginal bleeding, vaginal discharge and vaginal pain.   Neurological:  Negative for headaches.        Dizziness   Psychiatric/Behavioral:  Negative for depression.       Objective:     Vital Signs (Most Recent):  Temp: 98.4 °F (36.9 °C) (11/19/23 0751)  Pulse: 78 (11/20/23 1026)  Resp: 18 (11/20/23 1000)  BP: 119/77 (11/20/23 1026)  SpO2: 99 % (11/20/23 1022) Vital Signs (24h Range):  Pulse:  [76-80] 78  Resp:  [18] 18  SpO2:  [99 %-100 %] 99 %  BP: (116-120)/(77-79) 119/77     Weight: 84.8 kg (187 lb)  Body mass index is 31.12 kg/m².    FHT: Cat 1 (reassuring)  TOCO:  irregular     Physical Exam:   Constitutional: She is oriented to person, place, and time. She appears well-developed and well-nourished. No distress.       Cardiovascular:  Normal rate.             Pulmonary/Chest: Effort normal.        Abdominal: Soft. She exhibits no distension.                 Neurological: She is alert and oriented to person, place, and time.    Skin: Skin is warm and dry.    Psychiatric: She has a normal mood and affect.        Cervix: deferred     Significant Labs:  Lab Results   Component Value Date    GROUPTRH O POS 05/08/2023    HEPBSAG Non-reactive 05/04/2023    STREPBCULT No Group B Streptococcus isolated 10/30/2023       CBC: No results for input(s): "WBC", "RBC", "HGB", " ""HCT", "PLT", "MCV", "MCH", "MCHC" in the last 48 hours.  I have personallly reviewed all pertinent lab results from the last 24 hours.  "

## 2023-11-20 NOTE — NURSING
Pt instructed on  process. Requested sve.  Sve FT/ 50-2. No vaginal bleeding or leaking noted. Abdomen remains soft and non tender to touch. S.o will attend c/s w/ pt. Bs 79

## 2023-11-20 NOTE — ANESTHESIA PROCEDURE NOTES
Spinal    Diagnosis: C Section  Patient location during procedure: OR  Start time: 11/20/2023 11:34 AM  Timeout: 11/20/2023 11:33 AM  End time: 11/20/2023 11:36 AM    Staffing  Authorizing Provider: Doug Dupont MD  Performing Provider: Doug Dupont MD    Staffing  Performed by: Doug Dupont MD  Authorized by: Doug Dupont MD    Preanesthetic Checklist  Completed: patient identified, IV checked, site marked, risks and benefits discussed, surgical consent, monitors and equipment checked, pre-op evaluation and timeout performed  Spinal Block  Prep: ChloraPrep  Patient monitoring: heart rate and frequent blood pressure checks  Location: L3-4  Injection technique: single shot  CSF Fluid: clear free-flowing CSF  Needle  Needle type: pencil-tip   Needle gauge: 25 G  Needle length: 3.5 in  Needle localization: anatomical landmarks  Assessment  Ease of block: easy  Patient's tolerance of the procedure: comfortable throughout block  Medications:    Medications: bupivacaine (pf) (MARCAINE) injection 0.75% - Intraspinal   1.6 mL - 11/20/2023 11:36:00 AM

## 2023-11-20 NOTE — ANESTHESIA PREPROCEDURE EVALUATION
2023      Amber S Toussaint is a 37 y.o., female here for     Past Medical History:   Diagnosis Date    Graves disease 2013    Iron deficiency anemia 2021     Past Surgical History:   Procedure Laterality Date    BREAST SURGERY      Cysts removed     SECTION      DIAGNOSTIC LAPAROSCOPY N/A 2021    Procedure: LAPAROSCOPY, DIAGNOSTIC, possible salpingostomy/ salpingectomy/  D& C;  Surgeon: Cheri Hunter MD;  Location: [facility];  Service: OB/GYN;  Laterality: N/A;    DILATION AND CURETTAGE OF UTERUS  2021    Procedure: DILATION AND CURETTAGE, UTERUS;  Surgeon: Cheri Hunter MD;  Location: [facility] OR;  Service: OB/GYN;;    fibroid removal  ,     breast    HEMANGIOMA EXCISION      right thigh    LAPAROSCOPIC SALPINGECTOMY Right 2021    Procedure: SALPINGECTOMY, LAPAROSCOPIC;  Surgeon: Cheri Hunter MD;  Location: [facility];  Service: OB/GYN;  Laterality: Right;           Pre-op Assessment    I have reviewed the Patient Summary Reports.     I have reviewed the Nursing Notes. I have reviewed the NPO Status.      Review of Systems  Anesthesia Hx:   History of prior surgery of interest to airway management or planning:            Denies Personal Hx of Anesthesia complications.                    Cardiovascular:  Exercise tolerance: good                                           Pulmonary:  Pulmonary Normal                       Neurological:      Headaches                                 Endocrine:  Diabetes, gestational Hypothyroidism              Physical Exam  General: Well nourished    Airway:  Mallampati: II   Mouth Opening: Normal  Neck ROM: Normal ROM    Dental:  Intact        Anesthesia Plan  Type of Anesthesia, risks & benefits discussed:    Anesthesia Type: Gen Natural Airway  Informed Consent: Informed consent signed with the Patient and all  parties understand the risks and agree with anesthesia plan.  All questions answered.   ASA Score: 2    Ready For Surgery From Anesthesia Perspective.     .

## 2023-11-20 NOTE — DISCHARGE INSTRUCTIONS
After a Cesearean Birth    General Discharge Instructions  May follow a regular diet, unless otherwise discussed with physician.  Take showers, not baths until instructed by your doctor.   For the next 5 days, continue to use Hibiclens solution when you shower. Always use a clean towel when drying incision.  Incision dressing should be removed 7 days after surgery. If dressing begins to peel up prior to this day, gently remove.    Activity as tolerated.  No lifting or heavy exercise for 6 weeks, no driving for 2 weeks, no sexual intercourse, douching or tampons for 6 weeks  May return to work/school as discussed with physician  Rubella vaccine given 11/22/23.   (Avoid pregnancy for 3 mos. after injection).  Discuss birth control with physician  Breast care support bra worn at all times  Lactation consultant referral number ( [phone] or [phone])    Call Your Healthcare Provider Right Away If You Have:  A temperature of 100.4°F or higher.  If your blood pressure is over 155/105.  You have difficulty catching your breath or trouble breathing.  Heavy vaginal bleeding, clots, or vaginal discharge with foul odor. (heavier than menses)  Persistent nausea or vomiting.  You gain more than 3 pounds in 3 days.  Severe headaches not relieved by Tylenol (acetaminophen) or Motrin (ibuprofen)  Blurry or double vision, see spots or flashing lights.  Dizziness or fainting.  New onset swelling or worsening of existing swelling.  Burning or pain when you urinate.  No bowel movement for 5 days.  Redness, warmth, swelling, or pain in the lower leg.  Redness, discharge, or pain worse than you had in the hospital.  Burning, pain, red streaks, or lumpy areas in your breasts.  Cracks, blisters, or blood on your nipples.  Feelings of extreme sadness or anxiety, or a feeling that you dont want to be with your baby.  If you have any new or unusual symptoms or have questions or concerns    Incision Care  You will be able to shower  and pat the incision dry.  Watch your incision for signs of infection, such as increasing redness or drainage.  For ease of movement, hold a pillow against the incision when you get up from a lying or sitting position, and when you laugh or cough.  Avoid heavy lifting--nothing heavier than your baby until your doctor instructs you otherwise.     Follow-Up  Schedule a  follow-up exam with your healthcare provider for about 6 weeks after delivery. During this exam, your uterus and vaginal area will be checked. Contact your healthcare provider if you think you or your baby are having any problems.                                                                                                             Breastfeeding Discharge Instructions     AAP recommendation of exclusive breastfeeding for the first 6 months of life and continued breastfeeding with the introduction of supplemental foods beyond the first year of life and recommends to delay all bottle and pacifier use until after 4 weeks of age and breastfeeding is well established.  Discussed the benefits of exclusive breastfeeding for both mother and baby.  Discussed the risks of supplementation/pacifier use on the exclusivity of breastfeeding in the first 6 months. Feed the baby at the earliest sign of hunger or comfort  Hands to mouth, sucking motions  Rooting or searching for something to suck on  Dont wait for crying - it is a not a late sign of hunger; it is a sign of distress    The feedings may be 8-12 times per 24hrs and will not follow a schedule  Alternate the breast you start the feeding with, or start with the breast that feels the fullest  Switch breasts when the baby takes himself off the breast or falls asleep  Keep offering breasts until the baby looks full, no longer gives hunger signs, and stays asleep when placed on his back in the crib  If the baby is sleepy and wont wake for a feeding, put the baby skin-to-skin dressed in a  diaper against the mothers bare chest  Sleep near your baby  The baby should be positioned and latched on to the breast correctly  Chest-to-chest, chin in the breast  Babys lips are flipped outward  Babys mouth is stretched open wide like a shout  Babys sucking should feel like tugging to the mother  The baby should be drinking at the breast:  You should hear swallowing or gulping throughout the feeding  You should see milk on the babys lips when he comes off the breast  Your breasts should be softer when the baby is finished feeding  The baby should look relaxed at the end of feedings  After the 4th day and your milk is in:  The babys poop should turn bright yellow and be loose, watery, and seedy  The baby should have at least 3-4 poops the size of the palm of your hand per day  The baby should have at least 6-8 wet diapers per day  The urine should be light yellow in color  You should drink when you are thirsty and eat a healthy diet when you are    hungry.     Take naps to get the rest you need.   Take medications and/or drink alcohol only with permission of your obstetrician    or the babys pediatrician.  You can also call the [facility],   ([phone]), Monday-Friday, 8am-5pm Central time, to get the most   up-to-date evidence-based information on the use of medications during   pregnancy and breastfeeding.      The baby should be examined by a pediatrician at 3-5 days of age; unless ordered sooner by the pediatrician.  Once your milk comes in, the baby should be back to birth weight no later than 10-14 days of age.    Primary Engorgement    If the milk is flowing, use wet or dry heat applied to the breasts for approximately 10min prior to each feeding as a comfort measure to facilitate the milk ejection reflex    Follow heat treatment with breast massage to soften hard/lumpy areas of the breast    Use unrestricted, frequent, effective feedings.      Wake baby to feed if necessary    Avoid  pacifier and bottle feedings    Hand express or pump breasts to the point of comfort prn    Use cold treatments in the form of ice packs/gel packs/ frozen vegetables wrapped in a soft thin cloth and applied to the breasts for approximately 20min after each feeding until engorgement is resolved    Wear comfortable, supportive bra    Take pain medicine prn    Use anti-inflammatory medications if prescribed by physician    Other:    [place] Pumping Instructions :    Preparation and Hygiene:    Shower daily.  Wear a clean bra each day and wash daily in warm soapy water.  Change wet or moist breast pads frequently.  Moist pads can promote growth of germs.  Actively wash your hands, paying close attention to the area around and under your fingernails, thoroughly with soap and water for 15 seconds before pumping or handling your milk.  Re-wash your hands if you touch anything (scratching your nose, answering the phone, etc) while pumping or handling your milk.   Before pumping your breasts, assemble the pump collection kit and have ready the sterile container and labels.  Place these items on a clean surface next to the breastpump.  Each time after you have finished pumping, take apart all of the parts of the breastpump collection kit and place them in a separate cleaning container (do not place them in the sink).  Be sure to remove the yellow valve from the breastshield and separate the white membrane from the yellow valve.  Rinse all of these parts with cool water.  Then use a new sponge and/or bottle brush and dishwashing detergent to clean the parts.  Rinse off the soapy water with cool water and air dry on a clean towel covered with a clean cloth.  All parts may also be washed after each use in the top rack of a .  Once each day, sterilize all of the parts of the breastpump collection kit.  This can be done by boiling the kit parts for 10 minutes or by using a Quick Clean Micro-Steam Bag made by [employer]  If condensation appears in the tubing, continue to run the pump with the tubing attached for 1-2 minutes or until the tubing is dry.   Notify your babys nurse or doctor if you become ill or need to take any medication, even over-the-counter medicines.        Collection and Storage of Expressed Breastmilk:         1. Pump your breasts at least 8-10 times every 24 hours.  Double pump (both breasts at  the same time) for at least 15-20 minutes using the most suction that is comfortable.    2. Write the date and time of pumping and the name of any medications you are takingon the babys pre-printed hospital identification label.   3.    Do not touch the inside of the storage containers or lids.      4.        Tightly screw the lid onto the container and place immediately into the                                refrigerator for daily use.  Bottle may remain at room temperature if the next                    feeding is within 4 hours.  5.    Expressed breastmilk should be refrigerated or frozen within 4 hours of                pumping.  6.        Do not store expressed breastmilk on the door of your refrigerator or freeze             where the temperature is warmer.   7.        Refrigerated milk may be stored in for up to 7 days.  At this point it can be              moved to the freezer for 6 -12 months.  8.        Thaw frozen breast milk in overnight in the refrigerator.  Once milk is thawed it              must be used within 24 hours.  9.        Refrigerated breast milk needs to be warmed to room temperature.  Warm by leaving unrefrigerated until it reached room temperature, or place sealed bottle into a cup of warm (not boiling) water or use a bottle warmer.               Never warm breast milk in a microwave or boiling water.    For any questions or concerns call:  Lactation Department at [phone]

## 2023-11-20 NOTE — NURSING
NST reactive. Moderate variability. Accels present. CAT1. 1 ctx noted during NST. Pt denies feeling ctx.

## 2023-11-20 NOTE — PLAN OF CARE
Problem:  Fall Injury Risk  Goal: Absence of Fall, Infant Drop and Related Injury  Outcome: Ongoing, Progressing     Problem: Adult Inpatient Plan of Care  Goal: Plan of Care Review  Outcome: Ongoing, Progressing  Goal: Patient-Specific Goal (Individualized)  Outcome: Ongoing, Progressing  Goal: Absence of Hospital-Acquired Illness or Injury  Outcome: Ongoing, Progressing  Goal: Optimal Comfort and Wellbeing  Outcome: Ongoing, Progressing  Goal: Readiness for Transition of Care  Outcome: Ongoing, Progressing     Problem: Infection  Goal: Absence of Infection Signs and Symptoms  Outcome: Ongoing, Progressing     Problem: Breastfeeding  Goal: Effective Breastfeeding  Outcome: Ongoing, Progressing

## 2023-11-20 NOTE — LACTATION NOTE
11/20/23 1250   Maternal Assessment   Breast Density Bilateral:;soft   Areola Bilateral:;elastic   Nipples Bilateral:;everted   Maternal Infant Feeding   Maternal Emotional State independent   Infant Positioning laid back (ventral)   Signs of Milk Transfer audible swallow;infant jaw motion present   Pain with Feeding no   Latch Assistance no     Infant skin to skin, nursing on the left side in laid back position. Infant able to obtain and sustain effective latch. Suck and swallow verified. Patient denies pain with breastfeeding. Signs of an effective latch and hunger/satiety cues reviewed. Encouraged to feed infant on cue at least 8 in 24. Basic breastfeeding information reviewed utilizing breastfeeding guide. Patient verbalized understanding. All questions answered. Told to call for further lactation support needs.

## 2023-11-20 NOTE — H&P
[facility] - [place] - Labor & Delivery  Obstetrics  History & Physical    Patient Name: Amber S Toussaint  MRN: [id number]  Admission Date: 2023  Primary Care Provider: Mias Maria MD    Subjective:     Principal Problem:History of  delivery, currently pregnant    History of Present Illness:   Amber S Toussaint is a 37 y.o.  female with IUP at 39w0d weeks gestation who is admitted for scheduled  Section secondary to history of C/S.     This IUP is complicated by history of C/S, AMA, hypothyroidism, previous child with VSD/WPW, GDM diet controlled, and dizzines .  Patient reports contractions, denies vaginal bleeding, denies LOF.   Fetal Movement: normal.           OB History    Para Term  AB Living   4 1 1 0 2 1   SAB IAB Ectopic Multiple Live Births   1 0 1 0 1      # Outcome Date GA Lbr Misbah/2nd Weight Sex Delivery Anes PTL Lv   4 Current            3 SAB 2022 12w0d    SAB      2 Ectopic 21     ECTOPIC      1 Term 04/17/15 38w0d  2.92 kg (6 lb 7 oz) F CS-LTranv EPI N STEVEN      Complications: VSD (ventricular septal defect)      Name: Lamin     Past Medical History:   Diagnosis Date    Graves disease 2013    Iron deficiency anemia 2021     Past Surgical History:   Procedure Laterality Date    BREAST SURGERY      Cysts removed     SECTION      DIAGNOSTIC LAPAROSCOPY N/A 2021    Procedure: LAPAROSCOPY, DIAGNOSTIC, possible salpingostomy/ salpingectomy/  D& C;  Surgeon: Cheri Hunter MD;  Location: [facility];  Service: OB/GYN;  Laterality: N/A;    DILATION AND CURETTAGE OF UTERUS  2021    Procedure: DILATION AND CURETTAGE, UTERUS;  Surgeon: Cheri Hunter MD;  [facility];  Service: OB/GYN;;    fibroid removal  ,     breast    HEMANGIOMA EXCISION  2004    right thigh    LAPAROSCOPIC SALPINGECTOMY Right 2021    Procedure: SALPINGECTOMY, LAPAROSCOPIC;  Surgeon: Cheri Hunter MD;  Location: [facility];  Service: OB/GYN;  Laterality:  Right;       PTA Medications   Medication Sig    aspirin (ECOTRIN) 81 MG EC tablet Take 1 tablet (81 mg total) by mouth once daily.    ergocalciferol (ERGOCALCIFEROL) 50,000 unit Cap Take 50,000 Units by mouth once a week.    ferrous sulfate 325 (65 FE) MG EC tablet Take 1 tablet (325 mg total) by mouth once daily.    prenatal 105-iron-folic ac-dha 30 mg iron- 1.4 mg-300 mg Cmpk Take by mouth.       Review of patient's allergies indicates:   Allergen Reactions    Vinyl ether Hives        Family History       Problem Relation (Age of Onset)    Breast cancer Maternal Grandmother    Colon cancer Paternal Grandfather    Hypertension Mother, Father, Maternal Grandmother    Kidney failure Mother    Pacemaker/defibrilator Mother    Throat cancer Father          Tobacco Use    Smoking status: Never    Smokeless tobacco: Never   Substance and Sexual Activity    Alcohol use: Not Currently     Comment: occasional    Drug use: No    Sexual activity: Yes     Partners: Male     Review of Systems   Constitutional:  Negative for chills and fever.   Eyes:  Negative for visual disturbance.   Respiratory:  Negative for cough and wheezing.    Cardiovascular:  Negative for chest pain and palpitations.   Gastrointestinal:  Negative for abdominal pain, nausea and vomiting.   Genitourinary:  Negative for dysuria, frequency, hematuria, pelvic pain, vaginal bleeding, vaginal discharge and vaginal pain.   Neurological:  Negative for headaches.        Dizziness   Psychiatric/Behavioral:  Negative for depression.       Objective:     Vital Signs (Most Recent):  Temp: 98.4 °F (36.9 °C) (11/19/23 0751)  Pulse: 78 (11/20/23 1026)  Resp: 18 (11/20/23 1000)  BP: 119/77 (11/20/23 1026)  SpO2: 99 % (11/20/23 1022) Vital Signs (24h Range):  Pulse:  [76-80] 78  Resp:  [18] 18  SpO2:  [99 %-100 %] 99 %  BP: (116-120)/(77-79) 119/77     Weight: 84.8 kg (187 lb)  Body mass index is 31.12 kg/m².    FHT: Cat 1 (reassuring)  TOCO:  irregular     Physical  "Exam:   Constitutional: She is oriented to person, place, and time. She appears well-developed and well-nourished. No distress.       Cardiovascular:  Normal rate.             Pulmonary/Chest: Effort normal.        Abdominal: Soft. She exhibits no distension.                 Neurological: She is alert and oriented to person, place, and time.    Skin: Skin is warm and dry.    Psychiatric: She has a normal mood and affect.        Cervix: deferred     Significant Labs:  Lab Results   Component Value Date    GROUPTRH O POS 2023    HEPBSAG Non-reactive 2023    STREPBCULT No Group B Streptococcus isolated 10/30/2023       CBC: No results for input(s): "WBC", "RBC", "HGB", "HCT", "PLT", "MCV", "MCH", "MCHC" in the last 48 hours.  I have personallly reviewed all pertinent lab results from the last 24 hours.  Assessment/Plan:     37 y.o. female  at 39w0d for:    * History of  delivery, currently pregnant  - Admit to Labor & Delivery  - Ancef OCTOR  - Place IV and start IVFs  - Type & Screen and CBC ordered STAT  - COVID ordered STAT  - Continuous fetal monitoring  - Notify anesthesia of patient arrival  - Postpartum Hemorrhage risk: Medium     Dizziness  - Followed by neurology          Fe Armijo MD  Obstetrics  [facility] [place] - Labor & Delivery        "

## 2023-11-21 LAB
BASOPHILS # BLD AUTO: 0.03 K/UL (ref 0–0.2)
BASOPHILS NFR BLD: 0.3 % (ref 0–1.9)
DIFFERENTIAL METHOD: ABNORMAL
EOSINOPHIL # BLD AUTO: 0.1 K/UL (ref 0–0.5)
EOSINOPHIL NFR BLD: 0.6 % (ref 0–8)
ERYTHROCYTE [DISTWIDTH] IN BLOOD BY AUTOMATED COUNT: 15.6 % (ref 11.5–14.5)
HCT VFR BLD AUTO: 31.7 % (ref 37–48.5)
HGB BLD-MCNC: 10.2 G/DL (ref 12–16)
IMM GRANULOCYTES # BLD AUTO: 0.04 K/UL (ref 0–0.04)
IMM GRANULOCYTES NFR BLD AUTO: 0.4 % (ref 0–0.5)
LYMPHOCYTES # BLD AUTO: 2.4 K/UL (ref 1–4.8)
LYMPHOCYTES NFR BLD: 24.9 % (ref 18–48)
MCH RBC QN AUTO: 26.8 PG (ref 27–31)
MCHC RBC AUTO-ENTMCNC: 32.2 G/DL (ref 32–36)
MCV RBC AUTO: 83 FL (ref 82–98)
MONOCYTES # BLD AUTO: 0.9 K/UL (ref 0.3–1)
MONOCYTES NFR BLD: 9.6 % (ref 4–15)
NEUTROPHILS # BLD AUTO: 6.1 K/UL (ref 1.8–7.7)
NEUTROPHILS NFR BLD: 64.2 % (ref 38–73)
NRBC BLD-RTO: 0 /100 WBC
PLATELET # BLD AUTO: 278 K/UL (ref 150–450)
PMV BLD AUTO: 9.8 FL (ref 9.2–12.9)
RBC # BLD AUTO: 3.81 M/UL (ref 4–5.4)
WBC # BLD AUTO: 9.58 K/UL (ref 3.9–12.7)

## 2023-11-21 PROCEDURE — 99223 PR INITIAL HOSPITAL CARE,LEVL III: ICD-10-PCS | Mod: ,,, | Performed by: INTERNAL MEDICINE

## 2023-11-21 PROCEDURE — 25000003 PHARM REV CODE 250: Performed by: OBSTETRICS & GYNECOLOGY

## 2023-11-21 PROCEDURE — 36415 COLL VENOUS BLD VENIPUNCTURE: CPT | Performed by: OBSTETRICS & GYNECOLOGY

## 2023-11-21 PROCEDURE — 99223 1ST HOSP IP/OBS HIGH 75: CPT | Mod: ,,, | Performed by: INTERNAL MEDICINE

## 2023-11-21 PROCEDURE — 11000001 HC ACUTE MED/SURG PRIVATE ROOM

## 2023-11-21 PROCEDURE — 25000003 PHARM REV CODE 250: Performed by: ANESTHESIOLOGY

## 2023-11-21 PROCEDURE — 85025 COMPLETE CBC W/AUTO DIFF WBC: CPT | Performed by: OBSTETRICS & GYNECOLOGY

## 2023-11-21 PROCEDURE — 63600175 PHARM REV CODE 636 W HCPCS: Performed by: ANESTHESIOLOGY

## 2023-11-21 RX ADMIN — ACETAMINOPHEN 650 MG: 325 TABLET ORAL at 12:11

## 2023-11-21 RX ADMIN — PRENATAL VIT W/ FE FUMARATE-FA TAB 27-0.8 MG 1 TABLET: 27-0.8 TAB at 09:11

## 2023-11-21 RX ADMIN — DOCUSATE SODIUM 200 MG: 100 CAPSULE, LIQUID FILLED ORAL at 09:11

## 2023-11-21 RX ADMIN — MUPIROCIN: 20 OINTMENT TOPICAL at 09:11

## 2023-11-21 RX ADMIN — HYDROCODONE BITARTRATE AND ACETAMINOPHEN 1 TABLET: 10; 325 TABLET ORAL at 10:11

## 2023-11-21 RX ADMIN — HYDROCODONE BITARTRATE AND ACETAMINOPHEN 1 TABLET: 5; 325 TABLET ORAL at 05:11

## 2023-11-21 RX ADMIN — KETOROLAC TROMETHAMINE 30 MG: 30 INJECTION, SOLUTION INTRAMUSCULAR; INTRAVENOUS at 12:11

## 2023-11-21 RX ADMIN — IBUPROFEN 600 MG: 600 TABLET ORAL at 11:11

## 2023-11-21 RX ADMIN — IBUPROFEN 600 MG: 600 TABLET ORAL at 05:11

## 2023-11-21 RX ADMIN — ACETAMINOPHEN 650 MG: 325 TABLET ORAL at 05:11

## 2023-11-21 RX ADMIN — KETOROLAC TROMETHAMINE 30 MG: 30 INJECTION, SOLUTION INTRAMUSCULAR; INTRAVENOUS at 05:11

## 2023-11-21 RX ADMIN — FERROUS SULFATE TAB 325 MG (65 MG ELEMENTAL FE) 1 EACH: 325 (65 FE) TAB at 09:11

## 2023-11-21 NOTE — ANESTHESIA POSTPROCEDURE EVALUATION
Anesthesia Post Evaluation    Patient: Amber S Toussaint    Procedure(s) Performed: Procedure(s) (LRB):   SECTION (N/A)    Final Anesthesia Type: spinal      Patient location during evaluation: labor & delivery  Patient participation: Yes- Able to Participate  Level of consciousness: awake and alert and oriented  Post-procedure vital signs: reviewed and stable  Pain management: adequate  Airway patency: patent    PONV status at discharge: No PONV  Anesthetic complications: no      Cardiovascular status: blood pressure returned to baseline and hemodynamically stable  Respiratory status: unassisted, spontaneous ventilation and room air  Hydration status: euvolemic  Follow-up not needed.          Vitals Value Taken Time   /79 23 1114   Temp 36.7 °C (98 °F) 23 1114   Pulse 70 23 1114   Resp 17 23 1114   SpO2 97 % 23 1114         Event Time   Out of Recovery 2023 15:45:00         Pain/Med Score: Pain Rating Prior to Med Admin: 7 (2023  5:56 AM)  Pain Rating Post Med Admin: 0 (2023 10:00 AM)      No headache/neckache/backache  Able to urinate      Awake, alert & oriented  yes  Vital signs stable  yes  Pain controlled  yes  No nausea/vomiting  yes  Adequate hydration  yes

## 2023-11-21 NOTE — PROGRESS NOTES
11/21/23 0942   Maternal Assessment   Breast Density Bilateral:;soft   Areola Bilateral:;elastic   Nipples Bilateral:;everted   Maternal Infant Feeding   Maternal Emotional State relaxed;independent   Infant Positioning cradle   Signs of Milk Transfer audible swallow;infant jaw motion present   Pain with Feeding no   Latch Assistance no     Mother with baby breastfeeding independently -mother denies discomfort with feeding -able to hand express colostrum to initiate latch -strong sucking and swallows noted -encouraged call for any assistance

## 2023-11-21 NOTE — PLAN OF CARE
Problem:  Fall Injury Risk  Goal: Absence of Fall, Infant Drop and Related Injury  Outcome: Ongoing, Progressing     Problem: Adult Inpatient Plan of Care  Goal: Plan of Care Review  Outcome: Ongoing, Progressing  Goal: Optimal Comfort and Wellbeing  Outcome: Ongoing, Progressing  Goal: Readiness for Transition of Care  Outcome: Ongoing, Progressing     Problem: Infection  Goal: Absence of Infection Signs and Symptoms  Outcome: Ongoing, Progressing

## 2023-11-21 NOTE — CONSULTS
[facility] - Mother & Baby  Neurology Consult Note    Patient Name: Amber S Toussaint  Age: 37 y.o.  MRN: [id number]  Admission Date: 2023  Reason for consult:  Dizziness    CC:  Dizziness    HPI:   Amber S Toussaint is a 37 y.o. year old female post  day 1 with past medical history of gestational diabetes, Graves disease presented to the ED on 2023 for dizziness.  Neurology was consulted for the same.  Patient delivered her baby yesterday around noon.    Patient states that 2 days prior to presentation she was standing in the kitchen and she had sudden onset of feeling of dizziness.  She denies any weakness neck movement that could have triggered it.  She states that she has a sensation of the room spinning around her with eventually resolved when she went down and sad.  She states that she had another episode when she was sitting up the next day as well.  One day prior to presentation, patient woke up and had dizziness which was persistent all day and exaggerated with head movement.  Symptoms continued the next day which prompted the patient to present to the ER.  She denies any headache.  Denies any focal neurological deficits or blurry vision.  She did have 1 episode of vomiting at home just prior to the presentation.  Denies tinnitus, hearing loss.  Patient confirmed that she checked her blood sugar as well as blood pressure during these episodes which were unremarkable. Of note, patient was diagnosed with gestational diabetes 1 week before the presentation.  She was planned for  2 days after presentation.  Patient was admitted for evaluation of dizziness and potential  during the admission.    MRI brain without contrast was ordered which was unremarkable.  Patient is dizziness resolved on day 2 of admission just prior to the .  This morning patient was evaluated by me at bedside, stated that patient has not had any dizziness since yesterday morning even with  motion.        Histories:  Allergies:  Vinyl ether    Current Medications:    Current Facility-Administered Medications   Medication Dose Route Frequency Provider Last Rate Last Admin    acetaminophen tablet 650 mg  650 mg Oral Q6H Doug Dupont MD   650 mg at 11/21/23 0553    benzocaine-lanolin (DERMOPLAST) topical spray   Topical (Top) QID PRN Fe Armijo MD        bisacodyL suppository 10 mg  10 mg Rectal Once PRN Fe Armijo MD        carboprost injection 250 mcg  250 mcg Intramuscular Q15 Min PRN Fe Armijo MD        diphenhydrAMINE capsule 25 mg  25 mg Oral Q4H PRN Fe Armijo MD        diphenhydrAMINE injection 12.5 mg  12.5 mg Intravenous Q20 Min PRN Doug Dupont MD        diphenhydrAMINE injection 12.5 mg  12.5 mg Intravenous Nightly PRN Doug Dupont MD        diphenoxylate-atropine 2.5-0.025 mg per tablet 2 tablet  2 tablet Oral Q6H PRN Fe Armijo MD        docusate sodium capsule 200 mg  200 mg Oral BID Fe Armijo MD   200 mg at 11/21/23 0941    ferrous sulfate tablet 1 each  1 tablet Oral Daily Fe Armijo MD   1 each at 11/21/23 0942    HYDROcodone-acetaminophen  mg per tablet 1 tablet  1 tablet Oral Q4H PRN Fe Armijo MD        HYDROcodone-acetaminophen 5-325 mg per tablet 1 tablet  1 tablet Oral Q4H PRN Fe Armijo MD        hydrocortisone 2.5 % rectal cream   Rectal TID PRN Fe Armijo MD        ibuprofen tablet 600 mg  600 mg Oral Q6H Fe Armijo MD        ketorolac injection 30 mg  30 mg Intravenous Q6H Doug Dupont MD   30 mg at 11/21/23 0556    lanolin cream   Topical (Top) PRN Fe Armijo MD        magnesium hydroxide 400 mg/5 ml suspension 2,400 mg  30 mL Oral BID PRN Fe Armijo MD        measles, mumps and rubella vaccine 1,000-12,500 TCID50/0.5 mL injection 0.5 mL  0.5 mL Subcutaneous  vaccine x 1 dose Fe Armijo MD        methylergonovine injection 200 mcg  200 mcg Intramuscular Once PRN Fe Armijo MD        miSOPROStoL tablet 800 mcg  800 mcg Rectal Once PRN Fe Armijo MD        miSOPROStoL tablet 800 mcg  800 mcg Oral Once PRN Fe Armijo MD        mupirocin 2 % ointment   Nasal BID Fe Armijo MD   Given at 11/21/23 0942    ondansetron injection 4 mg  4 mg Intravenous Q12H PRN Doug Dupont MD        oxyCODONE immediate release tablet 10 mg  10 mg Oral Q4H PRN Doug Dupont MD        oxyCODONE immediate release tablet 5 mg  5 mg Oral Q4H PRN Doug Dupont MD        oxytocin 30 units in 500 mL lactated ringers infusion (non-titrating)  95 joey-units/min Intravenous Once Fe Armijo MD   Held at 11/20/23 1315    oxytocin 30 units in 500 mL lactated ringers infusion (non-titrating)  334 joey-units/min Intravenous Once PRN eF Armijo MD        oxytocin 30 units in 500 mL lactated ringers infusion (non-titrating)  95 joey-units/min Intravenous Once PRN Fe Armijo MD        oxytocin injection 10 Units  10 Units Intramuscular Once PRN Fe Armijo MD        pramoxine-hydrocortisone cream   Rectal QID PRN Fe Armijo MD        prenatal vitamin oral tablet  1 tablet Oral Daily Fe Armijo MD   1 tablet at 11/21/23 0941    prochlorperazine injection Soln 5 mg  5 mg Intravenous Q6H PRN Fe Armijo MD        senna-docusate 8.6-50 mg per tablet 1 tablet  1 tablet Oral Nightly PRN Fe Armijo MD        simethicone chewable tablet 80 mg  1 tablet Oral Q6H PRN Fe Armijo MD        sodium chloride 0.9% flush 10 mL  10 mL Intravenous PRN Fe Armijo, MD        Tdap (BOOSTRIX) vaccine injection 0.5 mL  0.5 mL Intramuscular vaccine x 1 dose Fe Armijo MD        tranexamic acid in  "NaCl,iso-os IVPB 1,000 mg  1,000 mg Intravenous Q30 Min PRN Fe Armijo MD           Medical:   Past Medical History:   Diagnosis Date    Graves disease 2013    Iron deficiency anemia 2021        Surgeries:   Past Surgical History:   Procedure Laterality Date    BREAST SURGERY      Cysts removed     SECTION       SECTION N/A 2023    Procedure:  SECTION;  Surgeon: Fe Armijo MD;  Location: [facility] L&[facility] OR;  Service: OB/GYN;  Laterality: N/A;    DIAGNOSTIC LAPAROSCOPY N/A 2021    Procedure: LAPAROSCOPY, DIAGNOSTIC, possible salpingostomy/ salpingectomy/  D& C;  Surgeon: Cheri Hunter MD;  Location: [facility] [place] OR;  Service: OB/GYN;  Laterality: N/A;    DILATION AND CURETTAGE OF UTERUS  2021    Procedure: DILATION AND CURETTAGE, UTERUS;  Surgeon: Cheri Hunter MD;  Location: [facility] [place] OR;  Service: OB/GYN;;    fibroid removal  ,     breast    HEMANGIOMA EXCISION      right thigh    LAPAROSCOPIC SALPINGECTOMY Right 2021    Procedure: SALPINGECTOMY, LAPAROSCOPIC;  Surgeon: Cheri Hunter MD;  Location: [facility] [place] OR;  Service: OB/GYN;  Laterality: Right;        Family:   Family History   Problem Relation Age of Onset    Pacemaker/defibrilator Mother     Hypertension Mother     Kidney failure Mother     Hypertension Father     Throat cancer Father     Breast cancer Maternal Grandmother     Hypertension Maternal Grandmother     Colon cancer Paternal Grandfather      labor Neg Hx     Stroke Neg Hx     Diabetes Neg Hx        Tobacco Use    Smoking status: Never    Smokeless tobacco: Never   Substance and Sexual Activity    Alcohol use: Not Currently     Comment: occasional    Drug use: No    Sexual activity: Yes     Partners: Male         Physical Exam:     Physical Examination  /79 (Patient Position: Sitting)   Pulse 70   Temp 98 °F (36.7 °C) (Oral)   Resp 17   Ht 5' 5" (1.651 m)   Wt 84.8 kg (187 lb)   LMP 2023 (Exact Date) "   SpO2 97%   Breastfeeding Yes   BMI 31.12 kg/m²   Body mass index is 31.12 kg/m².    Neurological Exam  Mental Status:   Alert and oriented to name, date, location, president.   Naming/Fluency/Comprehension/Repetition intact.   Recent/remote memory, registration, attention span/concentration, fund of knowledge intact by history.    ?CN: ?  II, III, IV, VI: PERRL, EOMI, no nystagmus, fundus not visualized due to inadequate dilation.?V: intact to fine touch, temperature, pain.?VII: symmetrical facial movement, nice smile, no drooping.?VIII: grossly intact to hearing.?IX, X: symmetrical palate, normal palatal elevation.?XI: 5/5 SCM and 5/5 trapezius.?XII: tongue midline, 5/5, no deviation or fasciculation.?                ?Motor:    ShAb ElbEx ElbFle WrE WrFle Interos  HipFl KnExt KnFlex FtDors FtPlant   Left 5 5 5 5 5 5 5 5 5 5 5 5   Right 5 5 5 5 5 5 5 5 5 5 5 5   Tone: Normal tone in UE and LE, no atrophy, no fasciculation, no tremor no pronator drift.                Reflexes:    Bicep Tricep Brachioradial Patellar Achilles   Left 2+ 2+ 2+ 2+ 1+   Right 2+ 2+ 2+ 2+ 1+   No Clonus, down going toes b/l.    Sensation: on both UEs and LEs    ?Light Touch: Normal.?Pinprick: Normal.?Proprioception: Normal.?Vibration: Normal.?Temperature: Normal.    ?Coordination:    ?Tremor: Absent.?Dysmetria: Absent.?Heel to Shin: Normal.?Nose to Finger: Normal.    ??Gait:    normal      Significant Imaging:   Images were personally interpreted:   MRI brain without contrast:  Unremarkable      Assessment and Plan:   #dizziness  37 y.o. year old female post  day 1 with past medical history of gestational diabetes, Graves disease presented to the ED on 2023 for dizziness.  Symptoms were transient and resolved yesterday morning just prior to the .  Possibly secondary to metabolic/hormonal cause.  The patient did not have any associated headache.  Brain imaging was unremarkable.  There was 1 episode of  vomiting although concern for DVST is low, no focal neurological deficits.  We will obtain imaging to rule it out.  Also denies tinnitus, hearing loss.  Low likelihood of peripheral cause    Plan:   - MRV head ordered.  Kindly we talked to Neurology again if results are abnormal.  - patient can follow up in outpatient Neurology if symptoms recur.    Thank you for your consult. I will sign off. Please contact us if you have any additional questions.    Time spent on this encounter: 60 minutes. This includes face to face time and non-face to face time preparing to see the patient (eg, review of tests), obtaining and/or reviewing separately obtained history, documenting clinical information in the electronic or other health record, independently interpreting results and communicating results to the patient/family/caregiver, or care coordinator.     Thor Jeffers MD  Neurology  [facility]

## 2023-11-21 NOTE — L&D DELIVERY NOTE
[facility] - Mother & Baby   Section   Operative Note    SUMMARY     Date of Procedure: 2023     Procedure: Procedure(s) (LRB):   SECTION (N/A)    Surgeon(s) and Role:     * Fe Armijo MD - Primary    Assistant: Alissa Oh PA-C    Pre-Operative Diagnosis: Normal pregnancy in third trimester [Z34.93]    Post-Operative Diagnosis: Post-Op Diagnosis Codes:     * Normal pregnancy in third trimester [Z34.93]    Anesthesia: Spinal/Epidural    Technical Procedures Used: Repeat Low Transverse  Section via Pfannenstiel Incision           Description of the Findings of the Procedure:   Female infant in cephalic presentation  Normal fallopian tubes and ovaries bilaterally      Complications: No    Blood Loss: 280 cc     With patient in supine position, the legs are  and Branham Catheter placed and positioning to supine done.   Abdomen prepped with Chloroprep and 3 minute drying time allowed prior to draping of the abdomen.   Time out taken with OR team members.  Pfannenstiel Incision made through the skin, transverse fascial incision developed, rectus muscles  in the midline and the peritoneum entered.   no adhesions noted.  The lower uterine segment and position of the fetus identified.   Bladder flap taken down through transverse peritoneal incision.    Low Transverse Incision made through well developer lower uterine segment and extended laterally with blunt dissection.   Clear fluid noted.  Infant delivered from vertex presentation.  Cord clamped after one minute and  handed to attending nurse.  Cord blood taken, placenta delivered.  The uterus was exteriorized.  The edges of the uterine incision are grasped with Townsend clamps at the angles and the inferior and superior midline edges of the incision.    Closure with running lock 0 Monocryl, starting at the left angle and tying on the right.  A second 0 Monocryl was used for an imbricating layer over  "the hysterotomy.  Observation for bleeding with suture of any bleeding along the hysterotomy line.   With good hemostasis noted, the anterior pelvis is rinsed with sterile saline.   Right and left adnexa with normal anatomy.     Closure of the abdomen with 2 0 Vicryl running of the peritoneum and rectus muscles, fascial closure with 0 Vicryl starting at the each angle and tying the knot in the midline angle.  3-0 plain gut was used to reapproximate the subcutaneous tissue.  Skin closure with 4 0 Monocryl subcuticular.  Wound dressed with Aquacel.            Specimens:   Specimen (24h ago, onward)      None            Condition: Good    VTE Risk Mitigation (From admission, onward)      None            Disposition: PACU - hemodynamically stable.    Attestation: Good         Delivery Information for Girl Amber Toussaint    Birth information:  YOB: [birth year]23   Time of birth: 12:03 PM   Sex: female   Head Delivery Date/Time: 2023 12:02 PM   Delivery type: , Low Transverse   Gestational Age: 39w0d        Delivery Providers    Delivering clinician: Fe Armijo MD   Provider Role    Tina Carson RN Delivery Nurse    Leora Cantu RN Registered Nurse    Alissa Oh, PA-C First Assist    Kristopher Méndez, CRNA Nurse Anesthetist    Alissa Ya, NNP Nurse Practitioner    Inez Anderson Person              Measurements    Weight: 3210 g  Weight (lbs): 7 lb 1.2 oz  Length: 49.5 cm  Length (in): 19.5"         Apgars    Living status: Living  Apgar Component Scores:  1 min.:  5 min.:  10 min.:  15 min.:  20 min.:    Skin color:  1  1       Heart rate:  2  2       Reflex irritability:  2  2       Muscle tone:  2  2       Respiratory effort:  2  2       Total:  9  9       Apgars assigned by: ALISSA JONES         Operative Delivery    Forceps attempted?: No  Vacuum extractor attempted?: No         Shoulder Dystocia    Shoulder dystocia present?: No   "         Presentation    Presentation: Vertex           Interventions/Resuscitation    Method: Bulb Suctioning, Tactile Stimulation, NICU Attended       Cord    Vessels: 3 vessels  Complications: None  Delayed Cord Clamping?: No  Cord Clamped Date/Time: 2023 12:03 PM  Cord Blood Disposition: Sent with Baby, Lab  Gases Sent?: No  Stem Cell Collection (by MD): No       Placenta    Placenta delivery date/time: 2023 1203  Placenta removal: Manual removal  Placenta appearance: Intact  Placenta disposition: Discarded           Labor Events:       labor:       Labor Onset Date/Time:         Dilation Complete Date/Time:         Start Pushing Date/Time:         Start Pushing Date/Time:       Rupture Date/Time:            Rupture type: INT (Intact)         Fluid Amount:       Fluid Color:                steroids:       Antibiotics given for GBS:       Induction:       Indications for induction:        Augmentation:       Indications for augmentation:       Labor complications:       Additional complications:          Cervical ripening:                     Delivery:      Episiotomy:       Indication for Episiotomy:       Perineal Lacerations:   Repaired:      Periurethral Laceration:   Repaired:     Labial Laceration:   Repaired:     Sulcus Laceration:   Repaired:     Vaginal Laceration:   Repaired:     Cervical Laceration:   Repaired:     Repair suture:       Repair # of packets:       Last Value - EBL - Nursing (mL):       Sum - EBL - Nursing (mL): 0     Last Value - EBL - Anesthesia (mL):      Calculated QBL (mL): 280      Vaginal Sweep Performed:       Surgicount Correct:       Vaginal Packing:   Quantity:       Other providers:       Anesthesia    Method: Spinal          Details (if applicable):  Trial of Labor No    Categorization: Repeat    Priority: Routine   Indications for : Repeat Section   Incision Type: low transverse     Additional   information:  Forceps:    Vacuum:    Breech:    Observed anomalies    Other (Comments):

## 2023-11-22 VITALS
DIASTOLIC BLOOD PRESSURE: 71 MMHG | HEIGHT: 65 IN | OXYGEN SATURATION: 100 % | HEART RATE: 72 BPM | WEIGHT: 187 LBS | TEMPERATURE: 98 F | RESPIRATION RATE: 20 BRPM | BODY MASS INDEX: 31.16 KG/M2 | SYSTOLIC BLOOD PRESSURE: 122 MMHG

## 2023-11-22 LAB — RPR SER QL: NORMAL

## 2023-11-22 PROCEDURE — 99238 PR HOSPITAL DISCHARGE DAY,<30 MIN: ICD-10-PCS | Mod: ,,, | Performed by: OBSTETRICS & GYNECOLOGY

## 2023-11-22 PROCEDURE — 99238 HOSP IP/OBS DSCHRG MGMT 30/<: CPT | Mod: ,,, | Performed by: OBSTETRICS & GYNECOLOGY

## 2023-11-22 PROCEDURE — 25000003 PHARM REV CODE 250: Performed by: OBSTETRICS & GYNECOLOGY

## 2023-11-22 RX ORDER — IBUPROFEN 800 MG/1
800 TABLET ORAL EVERY 6 HOURS
Qty: 40 TABLET | Refills: 0 | Status: SHIPPED | OUTPATIENT
Start: 2023-11-22

## 2023-11-22 RX ORDER — HYDROCODONE BITARTRATE AND ACETAMINOPHEN 5; 325 MG/1; MG/1
1 TABLET ORAL EVERY 6 HOURS PRN
Qty: 40 TABLET | Refills: 0 | Status: SHIPPED | OUTPATIENT
Start: 2023-11-22

## 2023-11-22 RX ADMIN — DOCUSATE SODIUM 200 MG: 100 CAPSULE, LIQUID FILLED ORAL at 08:11

## 2023-11-22 RX ADMIN — IBUPROFEN 600 MG: 600 TABLET ORAL at 04:11

## 2023-11-22 RX ADMIN — HYDROCODONE BITARTRATE AND ACETAMINOPHEN 1 TABLET: 10; 325 TABLET ORAL at 05:11

## 2023-11-22 RX ADMIN — PRENATAL VIT W/ FE FUMARATE-FA TAB 27-0.8 MG 1 TABLET: 27-0.8 TAB at 08:11

## 2023-11-22 RX ADMIN — HYDROCODONE BITARTRATE AND ACETAMINOPHEN 1 TABLET: 10; 325 TABLET ORAL at 01:11

## 2023-11-22 RX ADMIN — SIMETHICONE 80 MG: 80 TABLET, CHEWABLE ORAL at 11:11

## 2023-11-22 RX ADMIN — MUPIROCIN: 20 OINTMENT TOPICAL at 09:11

## 2023-11-22 RX ADMIN — HYDROCODONE BITARTRATE AND ACETAMINOPHEN 1 TABLET: 10; 325 TABLET ORAL at 08:11

## 2023-11-22 RX ADMIN — IBUPROFEN 600 MG: 600 TABLET ORAL at 05:11

## 2023-11-22 RX ADMIN — HYDROCODONE BITARTRATE AND ACETAMINOPHEN 1 TABLET: 10; 325 TABLET ORAL at 04:11

## 2023-11-22 RX ADMIN — IBUPROFEN 600 MG: 600 TABLET ORAL at 11:11

## 2023-11-22 RX ADMIN — FERROUS SULFATE TAB 325 MG (65 MG ELEMENTAL FE) 1 EACH: 325 (65 FE) TAB at 08:11

## 2023-11-22 NOTE — HOSPITAL COURSE
11/21/2023: POD#1 s/p RLTCS. She reports that she is doing better. She reports that she had a tumultuous admission for dizziness. She has had a CT head and MRI. Patient was seen by Neurology. She reports that the dizziness which was resolved. She reports that she is ambulating, voiding, and tolerating PO. She has passed gas but still feel gaseous. She reports that she is taking medication for pain which is getting controlled.     11/22/2023: POD#2 s/p RLTCS. She reports that her gas is mobilizing so she only has incisional pain. She reports that she is ambulating, voiding, and tolerating PO. She is breastfeeding her female infant.

## 2023-11-22 NOTE — PLAN OF CARE
Discharge instructions reviewed with mother. Instructed to monitor wet and dirty diapers and feed at least 8 in 24. Engorgement precautions discussed. Referred to breastfeeding guide for community resources. Patient has personal pump at home. Questions answered, patient verbalized understanding.

## 2023-11-22 NOTE — SUBJECTIVE & OBJECTIVE
Interval History:   She is doing well this morning. She is tolerating a regular diet without nausea or vomiting. She is voiding spontaneously. She is ambulating. She has passed flatus, and has not a BM. Vaginal bleeding is mild. She denies fever or chills. Abdominal pain is mild and controlled with oral medications. She Is breastfeeding. She desires circumcision for her male baby: not applicable.    Objective:     Vital Signs (Most Recent):  Temp: 98.2 °F (36.8 °C) (11/22/23 1140)  Pulse: 72 (11/22/23 1140)  Resp: 18 (11/22/23 1309)  BP: 122/71 (11/22/23 1140)  SpO2: 100 % (11/22/23 1140) Vital Signs (24h Range):  Temp:  [97.7 °F (36.5 °C)-98.5 °F (36.9 °C)] 98.2 °F (36.8 °C)  Pulse:  [67-77] 72  Resp:  [17-20] 18  SpO2:  [98 %-100 %] 100 %  BP: (112-125)/(71-77) 122/71     Weight: 84.8 kg (187 lb)  Body mass index is 31.12 kg/m².    No intake or output data in the 24 hours ending 11/22/23 1613      Significant Labs:  Lab Results   Component Value Date    GROUPTRH O POS 11/20/2023    HEPBSAG Non-reactive 05/04/2023    STREPBCULT No Group B Streptococcus isolated 10/30/2023     Recent Labs   Lab 11/21/23  0532   HGB 10.2*   HCT 31.7*       CBC:   Recent Labs   Lab 11/21/23  0532   WBC 9.58   RBC 3.81*   HGB 10.2*   HCT 31.7*      MCV 83   MCH 26.8*   MCHC 32.2     I have personallly reviewed all pertinent lab results from the last 24 hours.    Physical Exam:   Constitutional: She is oriented to person, place, and time. She appears well-developed and well-nourished. No distress.       Cardiovascular:  Normal rate.             Pulmonary/Chest: Effort normal.        Abdominal: Soft. She exhibits abdominal incision. She exhibits no distension.                 Neurological: She is alert and oriented to person, place, and time.    Skin: Skin is warm and dry.    Psychiatric: She has a normal mood and affect.       Review of Systems   Constitutional:  Negative for chills and fever.   Eyes:  Negative for visual  disturbance.   Respiratory:  Negative for cough and wheezing.    Cardiovascular:  Negative for chest pain and palpitations.   Gastrointestinal:  Negative for abdominal pain, nausea and vomiting.   Genitourinary:  Negative for dysuria, frequency, hematuria, pelvic pain, vaginal bleeding, vaginal discharge and vaginal pain.   Neurological:  Negative for headaches.   Psychiatric/Behavioral:  Negative for depression.

## 2023-11-22 NOTE — PROGRESS NOTES
Encouraged increased ambulation to relieve abd discomfort and gas pain; pt verbalized understanding.

## 2023-11-22 NOTE — PROGRESS NOTES
[facility]  Obstetrics  Postpartum Progress Note    Patient Name: Amber S Toussaint  MRN: [id number]  Admission Date: 2023  Hospital Length of Stay: 2 days  Attending Physician: Fe Armijo, *  Primary Care Provider: Misa Maria MD    Subjective:     Principal Problem:Status post repeat low transverse  section    Hospital Course:  2023: POD#1 s/p RLTCS. She reports that she is doing better. She reports that she had a tumultuous admission for dizziness. She has had a CT head and MRI. Patient was seen by Neurology. She reports that the dizziness which was resolved. She reports that she is ambulating, voiding, and tolerating PO. She has passed gas but still feel gaseous. She reports that she is taking medication for pain which is getting controlled.     2023: POD#2 s/p RLTCS. She reports that her gas is mobilizing so she only has incisional pain. She reports that she is ambulating, voiding, and tolerating PO. She is breastfeeding her female infant.    Interval History:   She is doing well this morning. She is tolerating a regular diet without nausea or vomiting. She is voiding spontaneously. She is ambulating. She has passed flatus, and has not a BM. Vaginal bleeding is mild. She denies fever or chills. Abdominal pain is mild and controlled with oral medications. She Is breastfeeding. She desires circumcision for her male baby: not applicable.    Objective:     Vital Signs (Most Recent):  Temp: 98.2 °F (36.8 °C) (23 1140)  Pulse: 72 (23 1140)  Resp: 18 (23 1309)  BP: 122/71 (23 1140)  SpO2: 100 % (23 1140) Vital Signs (24h Range):  Temp:  [97.7 °F (36.5 °C)-98.5 °F (36.9 °C)] 98.2 °F (36.8 °C)  Pulse:  [67-77] 72  Resp:  [17-20] 18  SpO2:  [98 %-100 %] 100 %  BP: (112-125)/(71-77) 122/71     Weight: 84.8 kg (187 lb)  Body mass index is 31.12 kg/m².    No intake or output data in the 24 hours ending 23 1613      Significant  Labs:  Lab Results   Component Value Date    GROUPTRH O POS 2023    HEPBSAG Non-reactive 2023    STREPBCULT No Group B Streptococcus isolated 10/30/2023     Recent Labs   Lab 23  0532   HGB 10.2*   HCT 31.7*       CBC:   Recent Labs   Lab 23  0532   WBC 9.58   RBC 3.81*   HGB 10.2*   HCT 31.7*      MCV 83   MCH 26.8*   MCHC 32.2     I have personallly reviewed all pertinent lab results from the last 24 hours.    Physical Exam:   Constitutional: She is oriented to person, place, and time. She appears well-developed and well-nourished. No distress.       Cardiovascular:  Normal rate.             Pulmonary/Chest: Effort normal.        Abdominal: Soft. She exhibits abdominal incision. She exhibits no distension.                 Neurological: She is alert and oriented to person, place, and time.    Skin: Skin is warm and dry.    Psychiatric: She has a normal mood and affect.       Review of Systems   Constitutional:  Negative for chills and fever.   Eyes:  Negative for visual disturbance.   Respiratory:  Negative for cough and wheezing.    Cardiovascular:  Negative for chest pain and palpitations.   Gastrointestinal:  Negative for abdominal pain, nausea and vomiting.   Genitourinary:  Negative for dysuria, frequency, hematuria, pelvic pain, vaginal bleeding, vaginal discharge and vaginal pain.   Neurological:  Negative for headaches.   Psychiatric/Behavioral:  Negative for depression.      Assessment/Plan:     37 y.o. female  for:    * Status post repeat low transverse  section  - Patient doing well. Continue routine management and advances.  - Continue PO pain meds. Pain well controlled.  - Pre H/H 10.8/34.6 --> post H/H 10.2/31.7; VSS, asymptomatic  - Encourage ambulation  - Circumcision - N/A  - Contraception will discuss at postpartum visit  - Lactation breast feeding    Dizziness  - Followed by neurology  - Resolved  - s/p MRI/MRV/MRA, head CT scan      Disposition: As  patient meets milestones, will plan to discharge today.    Fe Armijo MD  Obstetrics  [facility] [place] [facility]

## 2023-11-22 NOTE — SUBJECTIVE & OBJECTIVE
Interval History:   She is doing well this morning. She is tolerating a regular diet without nausea or vomiting. She is voiding spontaneously. She is ambulating. She has passed flatus, and has not a BM. Vaginal bleeding is mild. She denies fever or chills. Abdominal pain is moderate and controlled with oral medications. She Is breastfeeding. She desires circumcision for her male baby: not applicable.    Objective:     Vital Signs (Most Recent):  Temp: 98.2 °F (36.8 °C) (11/22/23 1140)  Pulse: 72 (11/22/23 1140)  Resp: 18 (11/22/23 1309)  BP: 122/71 (11/22/23 1140)  SpO2: 100 % (11/22/23 1140) Vital Signs (24h Range):  Temp:  [97.7 °F (36.5 °C)-98.5 °F (36.9 °C)] 98.2 °F (36.8 °C)  Pulse:  [67-77] 72  Resp:  [17-20] 18  SpO2:  [98 %-100 %] 100 %  BP: (112-125)/(71-77) 122/71     Weight: 84.8 kg (187 lb)  Body mass index is 31.12 kg/m².    No intake or output data in the 24 hours ending 11/22/23 1606      Significant Labs:  Lab Results   Component Value Date    GROUPTRH O POS 11/20/2023    HEPBSAG Non-reactive 05/04/2023    STREPBCULT No Group B Streptococcus isolated 10/30/2023     Recent Labs   Lab 11/21/23  0532   HGB 10.2*   HCT 31.7*       CBC:   Recent Labs   Lab 11/21/23  0532   WBC 9.58   RBC 3.81*   HGB 10.2*   HCT 31.7*      MCV 83   MCH 26.8*   MCHC 32.2     I have personallly reviewed all pertinent lab results from the last 24 hours.    Physical Exam:   Constitutional: She is oriented to person, place, and time. She appears well-developed and well-nourished. No distress.       Cardiovascular:  Normal rate.             Pulmonary/Chest: Effort normal.        Abdominal: Soft. She exhibits distension and abdominal incision.                 Neurological: She is alert and oriented to person, place, and time.    Skin: Skin is warm and dry.    Psychiatric: She has a normal mood and affect.       Review of Systems   Constitutional:  Negative for chills and fever.   Eyes:  Negative for visual disturbance.    Respiratory:  Negative for cough and wheezing.    Cardiovascular:  Negative for chest pain and palpitations.   Gastrointestinal:  Negative for abdominal pain, nausea and vomiting.   Genitourinary:  Negative for dysuria, frequency, hematuria, pelvic pain, vaginal bleeding, vaginal discharge and vaginal pain.   Neurological:  Negative for headaches.   Psychiatric/Behavioral:  Negative for depression.

## 2023-11-22 NOTE — LACTATION NOTE
11/22/23 0835   Maternal Assessment   Breast Density Bilateral:;filling   Areola Bilateral:;elastic   Nipples Bilateral:;everted   Maternal Infant Feeding   Maternal Emotional State independent;relaxed   Infant Positioning cradle   Signs of Milk Transfer audible swallow;infant jaw motion present   Pain with Feeding no   Latch Assistance no     Patient states breastfeeding is going well. Latched infant on left side in cradle position. Infant able to obtain and sustain effective latch. Suck/swallow verified. Patient denies breast discomfort when nursing. Discharge instructions reviewed with mother. Instructed to monitor wet and dirty diapers and feed at least 8 in 24. Engorgement precautions discussed. Referred to breastfeeding guide for community resources. Patient has personal pump at home. Questions answered, patient verbalized understanding.

## 2023-11-22 NOTE — DISCHARGE SUMMARY
[facility] [place], [place] [facility]  Obstetrics  Discharge Summary      Patient Name: Amber S Toussaint  MRN: [id number]  Admission Date: 2023  Hospital Length of Stay: 2 days  Discharge Date and Time:  2023 4:16 PM  Attending Physician: Carlos Boss, *   Discharging Provider: Carlos Boss MD   Primary Care Provider: Misa Maria MD    HPI:  Amber S Toussaint is a 37 y.o.  female with IUP at 39w0d weeks gestation who is admitted for scheduled  Section secondary to history of C/S.     This IUP is complicated by history of C/S, AMA, hypothyroidism, previous child with VSD/WPW, GDM diet controlled, and dizzines .  Patient reports contractions, denies vaginal bleeding, denies LOF.   Fetal Movement: normal.           Procedure(s) (LRB):   SECTION (N/A)     Hospital Course:   2023: POD#1 s/p RLTCS. She reports that she is doing better. She reports that she had a tumultuous admission for dizziness. She has had a CT head and MRI. Patient was seen by Neurology. She reports that the dizziness which was resolved. She reports that she is ambulating, voiding, and tolerating PO. She has passed gas but still feel gaseous. She reports that she is taking medication for pain which is getting controlled.     2023: POD#2 s/p RLTCS. She reports that her gas is mobilizing so she only has incisional pain. She reports that she is ambulating, voiding, and tolerating PO. She is breastfeeding her female infant.     Consults (From admission, onward)          Status Ordering Provider     Inpatient consult to Lactation  Once        Provider:  (Not yet assigned)    Acknowledged CARLOS BOSS     Inpatient consult to Hospitalist  Once        Provider:  Miguel Padgett MD    Completed SANFORD JIMENEZ III            Final Active Diagnoses:    Diagnosis Date Noted POA    PRINCIPAL PROBLEM:  Status post repeat low transverse  section [Z98.891] 2023 Not Applicable    Dizziness  "[R42] 2023 Yes    History of  delivery, currently pregnant [O34.219] 2023 Yes    Iron deficiency anemia [D50.9] 2021 Yes    Graves disease [E05.00]  Yes      Problems Resolved During this Admission:        Significant Diagnostic Studies: Labs: CBC   Recent Labs   Lab 23  0532   WBC 9.58   HGB 10.2*   HCT 31.7*        Specimen (24h ago, onward)      None              Feeding Method: breast    Immunizations       Date Immunization Status Dose Route/Site Given by    23 1302 MMR Incomplete 0.5 mL Subcutaneous/     23 1302 Tdap Incomplete 0.5 mL Intramuscular/             Delivery:    Episiotomy:     Lacerations:     Repair suture:     Repair # of packets:     Blood loss (ml):       Birth information:  YOB: 2021   Time of birth: 12:03 PM   Sex: female   Delivery type: , Low Transverse   Gestational Age: 39w0d     Measurements    Weight: 3210 g  Weight (lbs): 7 lb 1.2 oz  Length: 49.5 cm  Length (in): 19.5"         Delivery Clinician: Delivery Providers    Delivering clinician: Fe Armijo MD   Provider Role    Tina Carson RN Delivery Nurse    Leora Cantu RN Registered Nurse    Alissa Oh, PA-C First Assist    Kristopher Méndez, CRNA Nurse Anesthetist    Alissa Ya, NNP Nurse Practitioner    Inez Anderson Scrub Person             Additional  information:  Forceps:    Vacuum:    Breech:    Observed anomalies      Living?:     Apgars    Living status: Living  Apgar Component Scores:  1 min.:  5 min.:  10 min.:  15 min.:  20 min.:    Skin color:  1  1       Heart rate:  2  2       Reflex irritability:  2  2       Muscle tone:  2  2       Respiratory effort:  2  2       Total:  9  9       Apgars assigned by: ALISSA JONES         Placenta: Delivered:       appearance  Pending Diagnostic Studies:       None            Discharged Condition: good    Disposition: Home or Self Care    Follow Up:    Patient " Instructions:      Diet Adult Regular     Lifting restrictions     Pelvic Rest     Notify your health care provider if you experience any of the following:  temperature >100.4     Notify your health care provider if you experience any of the following:  persistent nausea and vomiting or diarrhea     Notify your health care provider if you experience any of the following:  severe uncontrolled pain     Notify your health care provider if you experience any of the following:  redness, tenderness, or signs of infection (pain, swelling, redness, odor or green/yellow discharge around incision site)     Notify your health care provider if you experience any of the following:  difficulty breathing or increased cough     Notify your health care provider if you experience any of the following:  severe persistent headache     Notify your health care provider if you experience any of the following:  worsening rash     Notify your health care provider if you experience any of the following:  persistent dizziness, light-headedness, or visual disturbances     Notify your health care provider if you experience any of the following:  increased confusion or weakness     Leave dressing on - Keep it clean, dry, and intact until clinic visit     Activity as tolerated     Weight bearing restrictions (specify):     Medications:  Current Discharge Medication List        START taking these medications    Details   HYDROcodone-acetaminophen (NORCO) 5-325 mg per tablet Take 1 tablet by mouth every 6 (six) hours as needed for Pain.  Qty: 40 tablet, Refills: 0    Comments: Quantity prescribed more than 7 day supply? No  Associated Diagnoses: Status post repeat low transverse  section      ibuprofen (ADVIL,MOTRIN) 800 MG tablet Take 1 tablet (800 mg total) by mouth every 6 (six) hours.  Qty: 40 tablet, Refills: 0    Associated Diagnoses: Status post repeat low transverse  section           CONTINUE these medications which have  NOT CHANGED    Details   ferrous sulfate 325 (65 FE) MG EC tablet Take 1 tablet (325 mg total) by mouth once daily.  Qty: 90 tablet, Refills: 3    Associated Diagnoses: Iron deficiency anemia secondary to inadequate dietary iron intake           STOP taking these medications       aspirin (ECOTRIN) 81 MG EC tablet Comments:   Reason for Stopping:         ergocalciferol (ERGOCALCIFEROL) 50,000 unit Cap Comments:   Reason for Stopping:         prenatal 105-iron-folic ac-dha 30 mg iron- 1.4 mg-300 mg Cmpk Comments:   Reason for Stopping:               Fe Armijo MD  Obstetrics  [facility] [place] - [facility]

## 2023-11-22 NOTE — PROGRESS NOTES
Encouraged increased ambulation to relieve abd discomfort and gas pain; pt verbalized understanding

## 2023-11-22 NOTE — PROGRESS NOTES
[facility]  Obstetrics  Postpartum Progress Note    Patient Name: Amber S Toussaint  MRN: [id number]  Admission Date: 2023  Hospital Length of Stay: 2 days  Attending Physician: Fe Armijo, *  Primary Care Provider: Misa Maria MD    Subjective:     Principal Problem:Status post repeat low transverse  section    Hospital Course:  2023: POD#1 s/p RLTCS. She reports that she is doing better. She reports that she had a tumultuous admission for dizziness. She has had a CT head and MRI. Patient was seen by Neurology. She reports that the dizziness which was resolved. She reports that she is ambulating, voiding, and tolerating PO. She has passed gas but still feel gaseous. She reports that she is taking medication for pain which is getting controlled.     Interval History:   She is doing well this morning. She is tolerating a regular diet without nausea or vomiting. She is voiding spontaneously. She is ambulating. She has passed flatus, and has not a BM. Vaginal bleeding is mild. She denies fever or chills. Abdominal pain is moderate and controlled with oral medications. She Is breastfeeding. She desires circumcision for her male baby: not applicable.    Objective:     Vital Signs (Most Recent):  Temp: 98.2 °F (36.8 °C) (23 1140)  Pulse: 72 (23 1140)  Resp: 18 (23 1309)  BP: 122/71 (23 1140)  SpO2: 100 % (23 1140) Vital Signs (24h Range):  Temp:  [97.7 °F (36.5 °C)-98.5 °F (36.9 °C)] 98.2 °F (36.8 °C)  Pulse:  [67-77] 72  Resp:  [17-20] 18  SpO2:  [98 %-100 %] 100 %  BP: (112-125)/(71-77) 122/71     Weight: 84.8 kg (187 lb)  Body mass index is 31.12 kg/m².    No intake or output data in the 24 hours ending 23 1606      Significant Labs:  Lab Results   Component Value Date    GROUPTRH O POS 2023    HEPBSAG Non-reactive 2023    STREPBCULT No Group B Streptococcus isolated 10/30/2023     Recent Labs   Lab 23  0532   HGB  10.2*   HCT 31.7*       CBC:   Recent Labs   Lab 23  0532   WBC 9.58   RBC 3.81*   HGB 10.2*   HCT 31.7*      MCV 83   MCH 26.8*   MCHC 32.2     I have personallly reviewed all pertinent lab results from the last 24 hours.    Physical Exam:   Constitutional: She is oriented to person, place, and time. She appears well-developed and well-nourished. No distress.       Cardiovascular:  Normal rate.             Pulmonary/Chest: Effort normal.        Abdominal: Soft. She exhibits distension and abdominal incision.                 Neurological: She is alert and oriented to person, place, and time.    Skin: Skin is warm and dry.    Psychiatric: She has a normal mood and affect.       Review of Systems   Constitutional:  Negative for chills and fever.   Eyes:  Negative for visual disturbance.   Respiratory:  Negative for cough and wheezing.    Cardiovascular:  Negative for chest pain and palpitations.   Gastrointestinal:  Negative for abdominal pain, nausea and vomiting.   Genitourinary:  Negative for dysuria, frequency, hematuria, pelvic pain, vaginal bleeding, vaginal discharge and vaginal pain.   Neurological:  Negative for headaches.   Psychiatric/Behavioral:  Negative for depression.      Assessment/Plan:     37 y.o. female  for:    * Status post repeat low transverse  section  - Patient doing well. Continue routine management and advances.  - Continue PO pain meds. Pain well controlled.  - Pre H/H 10.8/34.6 --> post H/H 10.2/31.7; VSS, asymptomatic  - Encourage ambulation  - Circumcision - N/A  - Contraception will discuss at postpartum visit  - Lactation breast feeding    Dizziness  - Followed by neurology  - Resolved  - s/p MRI/MRV/MRA, head CT scan        Disposition: As patient meets milestones, will plan to discharge POD#2-3.    Fe Armijo MD  Obstetrics  [facility]

## 2023-11-22 NOTE — PLAN OF CARE
Problem:  Fall Injury Risk  Goal: Absence of Fall, Infant Drop and Related Injury  Outcome: Ongoing, Progressing     Problem: Adult Inpatient Plan of Care  Goal: Plan of Care Review  Outcome: Ongoing, Progressing  Goal: Patient-Specific Goal (Individualized)  Outcome: Ongoing, Progressing  Goal: Absence of Hospital-Acquired Illness or Injury  Outcome: Ongoing, Progressing  Goal: Optimal Comfort and Wellbeing  Outcome: Ongoing, Progressing  Goal: Readiness for Transition of Care  Outcome: Ongoing, Progressing     Problem: Infection  Goal: Absence of Infection Signs and Symptoms  Outcome: Ongoing, Progressing     Problem: Breastfeeding  Goal: Effective Breastfeeding  Outcome: Ongoing, Progressing     Problem: Adjustment to Role Transition (Postpartum  Delivery)  Goal: Successful Maternal Role Transition  Outcome: Ongoing, Progressing     Problem: Bleeding (Postpartum  Delivery)  Goal: Hemostasis  Outcome: Ongoing, Progressing     Problem: Infection (Postpartum  Delivery)  Goal: Absence of Infection Signs and Symptoms  Outcome: Ongoing, Progressing     Problem: Pain (Postpartum  Delivery)  Goal: Acceptable Pain Control  Outcome: Ongoing, Progressing     Problem: Postoperative Nausea and Vomiting (Postpartum  Delivery)  Goal: Nausea and Vomiting Relief  Outcome: Ongoing, Progressing     Problem: Postoperative Urinary Retention (Postpartum  Delivery)  Goal: Effective Urinary Elimination  Outcome: Ongoing, Progressing     Problem:  Fall Injury Risk  Goal: Absence of Fall, Infant Drop and Related Injury  Outcome: Ongoing, Progressing     Problem: Adult Inpatient Plan of Care  Goal: Plan of Care Review  Outcome: Ongoing, Progressing  Goal: Patient-Specific Goal (Individualized)  Outcome: Ongoing, Progressing  Goal: Absence of Hospital-Acquired Illness or Injury  Outcome: Ongoing, Progressing  Goal: Optimal Comfort and Wellbeing  Outcome: Ongoing,  Progressing  Goal: Readiness for Transition of Care  Outcome: Ongoing, Progressing     Problem: Infection  Goal: Absence of Infection Signs and Symptoms  Outcome: Ongoing, Progressing     Problem: Breastfeeding  Goal: Effective Breastfeeding  Outcome: Ongoing, Progressing     Problem: Adjustment to Role Transition (Postpartum  Delivery)  Goal: Successful Maternal Role Transition  Outcome: Ongoing, Progressing     Problem: Bleeding (Postpartum  Delivery)  Goal: Hemostasis  Outcome: Ongoing, Progressing     Problem: Infection (Postpartum  Delivery)  Goal: Absence of Infection Signs and Symptoms  Outcome: Ongoing, Progressing     Problem: Pain (Postpartum  Delivery)  Goal: Acceptable Pain Control  Outcome: Ongoing, Progressing     Problem: Postoperative Nausea and Vomiting (Postpartum  Delivery)  Goal: Nausea and Vomiting Relief  Outcome: Ongoing, Progressing     Problem: Postoperative Urinary Retention (Postpartum  Delivery)  Goal: Effective Urinary Elimination  Outcome: Ongoing, Progressing

## 2023-11-22 NOTE — NURSING
Discharge instructions given; pt verbalized understanding.  Hibiclens, abdominal binder and adhesive remover given to pt w/instructions.

## 2023-11-22 NOTE — ASSESSMENT & PLAN NOTE
- Patient doing well. Continue routine management and advances.  - Continue PO pain meds. Pain well controlled.  - Pre H/H 10.8/34.6 --> post H/H 10.2/31.7; VSS, asymptomatic  - Encourage ambulation  - Circumcision - N/A  - Contraception will discuss at postpartum visit  - Lactation breast feeding

## 2023-11-22 NOTE — NURSING
Pt discharged in stable condition.  Pt taken to second floor of garage in wheelchair holding baby.  Pt accompanied by father and daughter.

## 2023-11-24 ENCOUNTER — TELEPHONE (OUTPATIENT)
Dept: OBSTETRICS AND GYNECOLOGY | Facility: CLINIC | Age: 38
End: 2023-11-24
Payer: MEDICAID

## 2023-11-24 ENCOUNTER — PATIENT MESSAGE (OUTPATIENT)
Dept: OBSTETRICS AND GYNECOLOGY | Facility: CLINIC | Age: 38
End: 2023-11-24
Payer: MEDICAID

## 2023-11-24 ENCOUNTER — TELEPHONE (OUTPATIENT)
Dept: OBSTETRICS AND GYNECOLOGY | Facility: HOSPITAL | Age: 38
End: 2023-11-24

## 2023-11-24 DIAGNOSIS — N61.0 MASTITIS: ICD-10-CM

## 2023-11-24 DIAGNOSIS — N61.0 MASTITIS: Primary | ICD-10-CM

## 2023-11-24 RX ORDER — DICLOXACILLIN SODIUM 250 MG/1
500 CAPSULE ORAL 4 TIMES DAILY
Qty: 40 CAPSULE | Refills: 0 | Status: SHIPPED | OUTPATIENT
Start: 2023-11-24 | End: 2023-11-24 | Stop reason: SDUPTHER

## 2023-11-24 RX ORDER — DICLOXACILLIN SODIUM 250 MG/1
500 CAPSULE ORAL 4 TIMES DAILY
Qty: 80 CAPSULE | Refills: 0 | Status: SHIPPED | OUTPATIENT
Start: 2023-11-24 | End: 2023-12-04

## 2023-11-24 NOTE — TELEPHONE ENCOUNTER
Spoke to pt who is struggling with engorgement into armpits and started running low grade fever today -has Rx called in by OB using warm and clod compresses with massage -suggested cabbage leaves to assist with softening -following up on Rx with NP

## 2023-11-27 ENCOUNTER — TELEPHONE (OUTPATIENT)
Dept: OBSTETRICS AND GYNECOLOGY | Facility: HOSPITAL | Age: 38
End: 2023-11-27
Payer: MEDICAID

## 2023-11-29 ENCOUNTER — POSTPARTUM VISIT (OUTPATIENT)
Dept: OBSTETRICS AND GYNECOLOGY | Facility: CLINIC | Age: 38
End: 2023-11-29
Payer: MEDICAID

## 2023-11-29 ENCOUNTER — PATIENT MESSAGE (OUTPATIENT)
Dept: OBSTETRICS AND GYNECOLOGY | Facility: CLINIC | Age: 38
End: 2023-11-29

## 2023-11-29 VITALS
BODY MASS INDEX: 29.87 KG/M2 | WEIGHT: 179.25 LBS | DIASTOLIC BLOOD PRESSURE: 90 MMHG | HEIGHT: 65 IN | SYSTOLIC BLOOD PRESSURE: 122 MMHG

## 2023-11-29 DIAGNOSIS — Z51.89 VISIT FOR WOUND CHECK: Primary | ICD-10-CM

## 2023-11-29 DIAGNOSIS — N61.0 MASTITIS: ICD-10-CM

## 2023-11-29 DIAGNOSIS — N39.0 URINARY TRACT INFECTION WITHOUT HEMATURIA, SITE UNSPECIFIED: Primary | ICD-10-CM

## 2023-11-29 DIAGNOSIS — R39.9 UTI SYMPTOMS: ICD-10-CM

## 2023-11-29 LAB
BACTERIA #/AREA URNS HPF: ABNORMAL /HPF
BILIRUB UR QL STRIP: ABNORMAL
CLARITY UR: ABNORMAL
COLOR UR: YELLOW
GLUCOSE UR QL STRIP: NEGATIVE
HGB UR QL STRIP: ABNORMAL
HYALINE CASTS #/AREA URNS LPF: 0 /LPF
KETONES UR QL STRIP: NEGATIVE
LEUKOCYTE ESTERASE UR QL STRIP: ABNORMAL
MICROSCOPIC COMMENT: ABNORMAL
NITRITE UR QL STRIP: POSITIVE
NON-SQ EPI CELLS #/AREA URNS HPF: 2 /HPF
PH UR STRIP: 6 [PH] (ref 5–8)
PROT UR QL STRIP: ABNORMAL
RBC #/AREA URNS HPF: 29 /HPF (ref 0–4)
SP GR UR STRIP: >1.03 (ref 1–1.03)
SQUAMOUS #/AREA URNS HPF: 13 /HPF
URN SPEC COLLECT METH UR: ABNORMAL
UROBILINOGEN UR STRIP-ACNC: ABNORMAL EU/DL
WBC #/AREA URNS HPF: >100 /HPF (ref 0–5)
WBC CLUMPS URNS QL MICRO: ABNORMAL

## 2023-11-29 PROCEDURE — 1111F PR DISCHARGE MEDS RECONCILED W/ CURRENT OUTPATIENT MED LIST: ICD-10-PCS | Mod: CPTII,,,

## 2023-11-29 PROCEDURE — 87088 URINE BACTERIA CULTURE: CPT

## 2023-11-29 PROCEDURE — 99213 OFFICE O/P EST LOW 20 MIN: CPT | Mod: S$PBB,,,

## 2023-11-29 PROCEDURE — 87086 URINE CULTURE/COLONY COUNT: CPT

## 2023-11-29 PROCEDURE — 1111F DSCHRG MED/CURRENT MED MERGE: CPT | Mod: CPTII,,,

## 2023-11-29 PROCEDURE — 87186 SC STD MICRODIL/AGAR DIL: CPT | Mod: 59

## 2023-11-29 PROCEDURE — 99999 PR PBB SHADOW E&M-EST. PATIENT-LVL III: CPT | Mod: PBBFAC,,,

## 2023-11-29 PROCEDURE — 87491 CHLMYD TRACH DNA AMP PROBE: CPT

## 2023-11-29 PROCEDURE — 99999 PR PBB SHADOW E&M-EST. PATIENT-LVL III: ICD-10-PCS | Mod: PBBFAC,,,

## 2023-11-29 PROCEDURE — 87070 CULTURE OTHR SPECIMN AEROBIC: CPT | Mod: 59

## 2023-11-29 PROCEDURE — 99213 PR OFFICE/OUTPT VISIT, EST, LEVL III, 20-29 MIN: ICD-10-PCS | Mod: S$PBB,,,

## 2023-11-29 PROCEDURE — 81000 URINALYSIS NONAUTO W/SCOPE: CPT

## 2023-11-29 PROCEDURE — 99213 OFFICE O/P EST LOW 20 MIN: CPT | Mod: PBBFAC

## 2023-11-29 PROCEDURE — 87077 CULTURE AEROBIC IDENTIFY: CPT

## 2023-11-29 RX ORDER — LANCETS 30 GAUGE
EACH MISCELLANEOUS
COMMUNITY
Start: 2023-10-24

## 2023-11-29 RX ORDER — LANCETS 33 GAUGE
EACH MISCELLANEOUS
COMMUNITY
Start: 2023-11-15

## 2023-11-29 RX ORDER — ERGOCALCIFEROL 1.25 MG/1
1 CAPSULE ORAL
COMMUNITY
Start: 2023-06-05

## 2023-11-29 RX ORDER — BLOOD SUGAR DIAGNOSTIC
STRIP MISCELLANEOUS
COMMUNITY
Start: 2023-11-15

## 2023-11-29 NOTE — PROGRESS NOTES
Postpartum Visit    CC:   Chief Complaint   Patient presents with    Postpartum Care       Amber S Toussaint is a 37 y.o. female  is here for a postpartum visit/ incision check. She is 1 weeks postpartum following a low cervical transverse  section, of a female infant weighinlb 1.2oz. The delivery was at 39w 0d.   Pregnancy was complicated by: previous CS, GDM (diet controlled), and advanced maternal age.    She reports some pain at her incision and odor. States that she started taking her rx pain medication and got some relief. She denies any fever, warmth, or redness at the site.     Reports burning with urination and flank pain    Mastitis follow up. Pt was started on Dicloxacillin for Mastitis 4 days ago. States the swelling in her armpits has decreased. She denies any fever.     She is accompanied today by her significant other.    OB History    Para Term  AB Living   4 2 2 0 2 2   SAB IAB Ectopic Multiple Live Births   1 0 1 0 2      # Outcome Date GA Lbr Misbah/2nd Weight Sex Delivery Anes PTL Lv   4 Term 23 39w0d  3.21 kg (7 lb 1.2 oz) F CS-LTranv Spinal  STEVEN   3 SAB 2022 12w0d    SAB      2 Ectopic 21     ECTOPIC      1 Term 04/17/15 38w0d  2.92 kg (6 lb 7 oz) F CS-LTranv EPI N STEVEN      Complications: VSD (ventricular septal defect)       ROS:  GENERAL: No fever, chills, fatigability.  VULVAR: No pain, no lesions and no itching.  VAGINAL: No relaxation, no itching, no discharge, no abnormal bleeding and no lesions.  ABDOMEN: No abdominal pain. Denies nausea. Denies vomiting. No diarrhea. No constipation.  Reports Incision has some odor.  BREAST: Denies pain. No lumps. No discharge.  URINARY: + dysuria No incontinence, no nocturia, and no frequency.  CARDIOVASCULAR: No chest pain. No shortness of breath. No leg cramps.  NEUROLOGICAL: No headaches. No vision changes.    PHYSICAL EXAM:   General appearance - alert, well appearing, and improved, oriented to person,  place, and time,   Mental status - alert, oriented to person, place, and time, normal mood, behavior, speech, dress, motor activity, and thought processes  Skin - coloration normal for race, good turgor, warm to touch, no rashes  Abdomen - soft, nontender, nondistended, no masses or organomegaly  Pfannenstiel incision: + odor, slight ss drainage noted, but healing well. Culture obtained. No tracking noted.   Extremities - no edema, redness or tenderness in the lower extremities or calves       PLAN:    1. Visit for wound check  - CULTURE, AEROBIC  (SPECIFY SOURCE)    Culture of incision obtained. No tracking noted.   Discussed signs and symptoms of infection including increased redness, warmth, pain, oozing, and fever.   Discussed to keep incision clean and dry.     2. UTI symptoms  - C. trachomatis/N. gonorrhoeae by AMP DNA  - Urinalysis, Reflex to Urine Culture Urine, Clean Catch    3. Mastitis  Continue dicloxacillin as ordered  Dicloxicillin 500 mg QID X 10 days  Continue the following:  - Apply heat pack or warm shower prior to breast feeding  - Pump or breastfeed more frequently starting on the affected side first  - Use correct positioning and massage the breast during feeding to help drain area  - Apply cold packs after feeding prn pain  - Use NSAIDs/tylenol prn fever/pain  - Increase rest, fluids and provide adequate nutrition;    Pt was seen and assessment discussed with  Dr Brennan Maria, she is in agreement with this plan

## 2023-12-01 LAB
BACTERIA UR CULT: ABNORMAL
C TRACH DNA SPEC QL NAA+PROBE: NOT DETECTED
N GONORRHOEA DNA SPEC QL NAA+PROBE: NOT DETECTED

## 2023-12-02 LAB — BACTERIA SPEC AEROBE CULT: ABNORMAL

## 2024-01-02 ENCOUNTER — POSTPARTUM VISIT (OUTPATIENT)
Dept: OBSTETRICS AND GYNECOLOGY | Facility: CLINIC | Age: 39
End: 2024-01-02
Payer: MEDICAID

## 2024-01-02 VITALS
DIASTOLIC BLOOD PRESSURE: 70 MMHG | BODY MASS INDEX: 27.85 KG/M2 | WEIGHT: 167.31 LBS | SYSTOLIC BLOOD PRESSURE: 132 MMHG

## 2024-01-02 DIAGNOSIS — Z86.32 HISTORY OF GESTATIONAL DIABETES: ICD-10-CM

## 2024-01-02 DIAGNOSIS — N61.0 MASTITIS: ICD-10-CM

## 2024-01-02 DIAGNOSIS — R30.0 DYSURIA: ICD-10-CM

## 2024-01-02 DIAGNOSIS — R42 DIZZINESS: ICD-10-CM

## 2024-01-02 LAB
BILIRUB SERPL-MCNC: NEGATIVE MG/DL
BLOOD URINE, POC: NORMAL
CLARITY, POC UA: NORMAL
COLOR, POC UA: YELLOW
GLUCOSE UR QL STRIP: NORMAL
KETONES UR QL STRIP: NEGATIVE
LEUKOCYTE ESTERASE URINE, POC: NORMAL
NITRITE, POC UA: NEGATIVE
PH, POC UA: 6
PROTEIN, POC: NORMAL
SPECIFIC GRAVITY, POC UA: 1.02
UROBILINOGEN, POC UA: NORMAL

## 2024-01-02 PROCEDURE — 87086 URINE CULTURE/COLONY COUNT: CPT | Performed by: OBSTETRICS & GYNECOLOGY

## 2024-01-02 PROCEDURE — 81002 URINALYSIS NONAUTO W/O SCOPE: CPT | Mod: PBBFAC | Performed by: OBSTETRICS & GYNECOLOGY

## 2024-01-02 PROCEDURE — 99999PBSHW POCT URINE DIPSTICK WITHOUT MICROSCOPE: Mod: PBBFAC,,,

## 2024-01-02 PROCEDURE — 99999 PR PBB SHADOW E&M-EST. PATIENT-LVL IV: CPT | Mod: PBBFAC,,, | Performed by: OBSTETRICS & GYNECOLOGY

## 2024-01-02 PROCEDURE — 99214 OFFICE O/P EST MOD 30 MIN: CPT | Mod: PBBFAC | Performed by: OBSTETRICS & GYNECOLOGY

## 2024-01-02 PROCEDURE — 81514 NFCT DS BV&VAGINITIS DNA ALG: CPT | Performed by: OBSTETRICS & GYNECOLOGY

## 2024-01-02 RX ORDER — SULFAMETHOXAZOLE AND TRIMETHOPRIM 800; 160 MG/1; MG/1
1 TABLET ORAL 2 TIMES DAILY
Qty: 14 TABLET | Refills: 0 | Status: SHIPPED | OUTPATIENT
Start: 2024-01-02 | End: 2024-01-02

## 2024-01-02 RX ORDER — SULFAMETHOXAZOLE AND TRIMETHOPRIM 800; 160 MG/1; MG/1
1 TABLET ORAL 2 TIMES DAILY
Qty: 14 TABLET | Refills: 0 | Status: SHIPPED | OUTPATIENT
Start: 2024-01-02 | End: 2024-01-09

## 2024-01-02 NOTE — PROGRESS NOTES
History & Physical  Gynecology      SUBJECTIVE:     Chief Complaint: Urinary Tract Infection, Gynecologic Exam, and Postpartum Care       History of Present Illness  Amber S Toussaint is a 38 y.o. female  presents for a postpartum visit.  She is status post  6 weeks ago.  Her hospitalization was complicated by dizziness for which she was seen by Hospital Medicine and Neurology. MRA/MRV was done. She reports that after delivery of her daughter the dizziness improved. She reports that the dizziness has returned, and she feels like she is in a fog. She reports that it is difficult for her to explain but she does not feel like herself.    Her incision had drainage which cultured to be Staph epidermis. She was prescribed Clindamycin which improved drainage and incision has healed. She still feels tenderness around her incision.  She is breastfeeding.  She desires no method for contraception.  She denies postpartum depression but currently her daughter cries when she is not holding her .    Her last pap was  and normal.       Review of patient's allergies indicates:   Allergen Reactions    Vinyl ether Hives       Past Medical History:   Diagnosis Date    Graves disease 2013    Iron deficiency anemia 2021     Past Surgical History:   Procedure Laterality Date    BREAST SURGERY      Cysts removed     SECTION       SECTION N/A 2023    Procedure:  SECTION;  Surgeon: Fe Armijo MD;  Location: [facility] L&D OR;  Service: OB/GYN;  Laterality: N/A;    DIAGNOSTIC LAPAROSCOPY N/A 2021    Procedure: LAPAROSCOPY, DIAGNOSTIC, possible salpingostomy/ salpingectomy/  D& C;  Surgeon: Cheri Hunter MD;  Location: [facility] OR;  Service: OB/GYN;  Laterality: N/A;    DILATION AND CURETTAGE OF UTERUS  2021    Procedure: DILATION AND CURETTAGE, UTERUS;  Surgeon: Cheri Hunter MD;  Location: [facility] OR;  Service: OB/GYN;;    fibroid removal  ,     breast    HEMANGIOMA  EXCISION  2004    right thigh    LAPAROSCOPIC SALPINGECTOMY Right 2021    Procedure: SALPINGECTOMY, LAPAROSCOPIC;  Surgeon: Cheri Hunter MD;  Location: [facility];  Service: OB/GYN;  Laterality: Right;     OB History          4    Para   2    Term   2       0    AB   2    Living   2         SAB   1    IAB   0    Ectopic   1    Multiple   0    Live Births   2               Family History   Problem Relation Age of Onset    Pacemaker/defibrilator Mother     Hypertension Mother     Kidney failure Mother     Hypertension Father     Throat cancer Father     Breast cancer Maternal Grandmother     Hypertension Maternal Grandmother     Colon cancer Paternal Grandfather      labor Neg Hx     Stroke Neg Hx     Diabetes Neg Hx      Social History     Tobacco Use    Smoking status: Never    Smokeless tobacco: Never   Substance Use Topics    Alcohol use: Not Currently     Comment: occasional    Drug use: No       Current Outpatient Medications   Medication Sig    ergocalciferol (ERGOCALCIFEROL) 50,000 unit Cap Take 1 capsule by mouth every 7 days.    ferrous sulfate 325 (65 FE) MG EC tablet Take 1 tablet (325 mg total) by mouth once daily.    HYDROcodone-acetaminophen (NORCO) 5-325 mg per tablet Take 1 tablet by mouth every 6 (six) hours as needed for Pain. (Patient not taking: Reported on 2023)    ibuprofen (ADVIL,MOTRIN) 800 MG tablet Take 1 tablet (800 mg total) by mouth every 6 (six) hours. (Patient not taking: Reported on 2023)    ONETOUCH DELICA PLUS LANCET 33 gauge Misc use TO test blood glucose FOUR TIMES DAILY    ONETOUCH ULTRA TEST Strp use TO test blood glucose FOUR TIMES DAILY    ONETOUCH ULTRA2 METER Misc SMARTSIG:Via Meter Daily    sulfamethoxazole-trimethoprim 800-160mg (BACTRIM DS) 800-160 mg Tab Take 1 tablet by mouth 2 (two) times daily. for 7 days     No current facility-administered medications for this visit.       Review of Systems:  Review of Systems   Constitutional:   Negative for chills and fever.   Eyes:  Negative for visual disturbance.   Respiratory:  Negative for cough and wheezing.    Cardiovascular:  Negative for chest pain and palpitations.   Gastrointestinal:  Negative for abdominal pain, nausea and vomiting.   Genitourinary:  Positive for dysuria. Negative for frequency, hematuria, pelvic pain, vaginal bleeding, vaginal discharge and vaginal pain.   Neurological:  Negative for headaches.        Dizziness   Psychiatric/Behavioral:  Negative for depression.         OBJECTIVE:     Physical Exam:  Physical Exam  Vitals and nursing note reviewed. Exam conducted with a chaperone present.   Constitutional:       Appearance: Normal appearance. She is well-developed.   Cardiovascular:      Rate and Rhythm: Normal rate.   Pulmonary:      Effort: Pulmonary effort is normal. No respiratory distress.   Abdominal:      General: There is no distension.      Palpations: Abdomen is soft.      Tenderness: There is no abdominal tenderness.      Comments: Incision healed well; tenderness below the incision   Genitourinary:     Exam position: Supine.   Skin:     General: Skin is warm and dry.   Neurological:      Mental Status: She is oriented to person, place, and time.       ASSESSMENT:       ICD-10-CM ICD-9-CM    1. Postpartum care and examination  Z39.2 V24.2 VAGINOSIS SCREEN BY DNA PROBE      2. Dysuria  R30.0 788.1 sulfamethoxazole-trimethoprim 800-160mg (BACTRIM DS) 800-160 mg Tab      POCT URINE DIPSTICK WITHOUT MICROSCOPE      Urine culture      DISCONTINUED: sulfamethoxazole-trimethoprim 800-160mg (BACTRIM DS) 800-160 mg Tab      3. Dizziness  R42 780.4 Ambulatory referral/consult to ENT      Ambulatory referral/consult to Neurology      4. History of gestational diabetes  Z86.32 V12.21 GLUCOSE TOLERANCE 2 HOUR             Plan:      Doreen was seen today for urinary tract infection, gynecologic exam and postpartum care.    Diagnoses and all orders for this visit:    Postpartum  care and examination  -     VAGINOSIS SCREEN BY DNA PROBE\  - Breast feeding  - No menses at this time  - Doing well  - Denies postpartum depression, denies SI/HI  - No for birth control     Dysuria  -     sulfamethoxazole-trimethoprim 800-160mg (BACTRIM DS) 800-160 mg Tab; Take 1 tablet by mouth 2 (two) times daily. for 7 days  -     POCT URINE DIPSTICK WITHOUT MICROSCOPE  -     Urine culture    Dizziness  -     Ambulatory referral/consult to ENT; Future  -     Ambulatory referral/consult to Neurology; Future    History of gestational diabetes  -     GLUCOSE TOLERANCE 2 HOUR; Future        Orders Placed This Encounter   Procedures    Urine culture    VAGINOSIS SCREEN BY DNA PROBE    GLUCOSE TOLERANCE 2 HOUR    Ambulatory referral/consult to ENT    Ambulatory referral/consult to Neurology    POCT URINE DIPSTICK WITHOUT MICROSCOPE       Follow up in about 3 weeks (around 1/23/2024) for Follow up.    Counseling time: 30 minutes    Fe Armijo

## 2024-01-04 LAB — BACTERIA UR CULT: NORMAL

## 2024-01-05 LAB
BACTERIAL VAGINOSIS DNA: NEGATIVE
CANDIDA GLABRATA DNA: NEGATIVE
CANDIDA KRUSEI DNA: NEGATIVE
CANDIDA RRNA VAG QL PROBE: NEGATIVE
T VAGINALIS RRNA GENITAL QL PROBE: NEGATIVE

## 2024-01-22 ENCOUNTER — OFFICE VISIT (OUTPATIENT)
Dept: OBSTETRICS AND GYNECOLOGY | Facility: CLINIC | Age: 39
End: 2024-01-22
Payer: MEDICAID

## 2024-01-22 VITALS — BODY MASS INDEX: 27.79 KG/M2 | DIASTOLIC BLOOD PRESSURE: 78 MMHG | WEIGHT: 167 LBS | SYSTOLIC BLOOD PRESSURE: 134 MMHG

## 2024-01-22 DIAGNOSIS — R32 URINARY INCONTINENCE IN FEMALE: Primary | ICD-10-CM

## 2024-01-22 DIAGNOSIS — R10.30 LOWER ABDOMINAL PAIN: ICD-10-CM

## 2024-01-22 DIAGNOSIS — R42 DIZZINESS: ICD-10-CM

## 2024-01-22 PROCEDURE — 99213 OFFICE O/P EST LOW 20 MIN: CPT | Mod: PBBFAC | Performed by: OBSTETRICS & GYNECOLOGY

## 2024-01-22 PROCEDURE — 1159F MED LIST DOCD IN RCRD: CPT | Mod: CPTII,,, | Performed by: OBSTETRICS & GYNECOLOGY

## 2024-01-22 PROCEDURE — 3075F SYST BP GE 130 - 139MM HG: CPT | Mod: CPTII,,, | Performed by: OBSTETRICS & GYNECOLOGY

## 2024-01-22 PROCEDURE — 3008F BODY MASS INDEX DOCD: CPT | Mod: CPTII,,, | Performed by: OBSTETRICS & GYNECOLOGY

## 2024-01-22 PROCEDURE — 99214 OFFICE O/P EST MOD 30 MIN: CPT | Mod: S$PBB,,, | Performed by: OBSTETRICS & GYNECOLOGY

## 2024-01-22 PROCEDURE — 3078F DIAST BP <80 MM HG: CPT | Mod: CPTII,,, | Performed by: OBSTETRICS & GYNECOLOGY

## 2024-01-22 PROCEDURE — 99999 PR PBB SHADOW E&M-EST. PATIENT-LVL III: CPT | Mod: PBBFAC,,, | Performed by: OBSTETRICS & GYNECOLOGY

## 2024-01-22 NOTE — PROGRESS NOTES
History & Physical  Gynecology      SUBJECTIVE:     Chief Complaint: Follow-up       History of Present Illness:  Amber S Toussaint is a 38 y.o. female  presents for a follow up visit.  She is status post  8 weeks ago.  Her hospitalization was complicated by dizziness for which she was seen by Hospital Medicine and Neurology. MRA/MRV was done. She reports that dizziness has improved but still present. She continues to complain of incontinence and dysuria. She also continues to have pain in her abdomen near her incision. It is hard for her to sit up straight for too long.        Review of patient's allergies indicates:   Allergen Reactions    Vinyl ether Hives       Past Medical History:   Diagnosis Date    Graves disease     Iron deficiency anemia 2021     Past Surgical History:   Procedure Laterality Date    BREAST SURGERY      Cysts removed     SECTION       SECTION N/A 2023    Procedure:  SECTION;  Surgeon: Fe Armijo MD;  Location: [facility] L[facility] OR;  Service: OB/GYN;  Laterality: N/A;    DIAGNOSTIC LAPAROSCOPY N/A 2021    Procedure: LAPAROSCOPY, DIAGNOSTIC, possible salpingostomy/ salpingectomy/  D& C;  Surgeon: Cheri Hunter MD;  Location: [facility] OR;  Service: OB/GYN;  Laterality: N/A;    DILATION AND CURETTAGE OF UTERUS  2021    Procedure: DILATION AND CURETTAGE, UTERUS;  Surgeon: Cheri Hunter MD;  Location: [facility] OR;  Service: OB/GYN;;    fibroid removal  ,     breast    HEMANGIOMA EXCISION      right thigh    LAPAROSCOPIC SALPINGECTOMY Right 2021    Procedure: SALPINGECTOMY, LAPAROSCOPIC;  Surgeon: Cheri Hunter MD;  Location: [facility] OR;  Service: OB/GYN;  Laterality: Right;     OB History          4    Para   2    Term   2       0    AB   2    Living   2         SAB   1    IAB   0    Ectopic   1    Multiple   0    Live Births   2               Family History   Problem Relation Age of Onset     Pacemaker/defibrilator Mother     Hypertension Mother     Kidney failure Mother     Hypertension Father     Throat cancer Father     Breast cancer Maternal Grandmother     Hypertension Maternal Grandmother     Colon cancer Paternal Grandfather      labor Neg Hx     Stroke Neg Hx     Diabetes Neg Hx      Social History     Tobacco Use    Smoking status: Never    Smokeless tobacco: Never   Substance Use Topics    Alcohol use: Not Currently     Comment: occasional    Drug use: No       Current Outpatient Medications   Medication Sig    ergocalciferol (ERGOCALCIFEROL) 50,000 unit Cap Take 1 capsule by mouth every 7 days.    ferrous sulfate 325 (65 FE) MG EC tablet Take 1 tablet (325 mg total) by mouth once daily.    ONETOUCH DELICA PLUS LANCET 33 gauge Misc use TO test blood glucose FOUR TIMES DAILY    ONETOUCH ULTRA TEST Strp use TO test blood glucose FOUR TIMES DAILY    ONETOUCH ULTRA2 METER Misc SMARTSIG:Via Meter Daily    HYDROcodone-acetaminophen (NORCO) 5-325 mg per tablet Take 1 tablet by mouth every 6 (six) hours as needed for Pain. (Patient not taking: Reported on 2023)    ibuprofen (ADVIL,MOTRIN) 800 MG tablet Take 1 tablet (800 mg total) by mouth every 6 (six) hours. (Patient not taking: Reported on 2023)     No current facility-administered medications for this visit.         Review of Systems:  Review of Systems   Constitutional:  Negative for chills and fever.   Eyes:  Negative for visual disturbance.   Respiratory:  Negative for cough and wheezing.    Cardiovascular:  Negative for chest pain and palpitations.   Gastrointestinal:  Positive for abdominal pain. Negative for nausea and vomiting.   Genitourinary:  Positive for bladder incontinence. Negative for dysuria, frequency, hematuria, pelvic pain, vaginal bleeding, vaginal discharge and vaginal pain.   Neurological:  Negative for headaches.   Psychiatric/Behavioral:  Negative for depression.         OBJECTIVE:     Physical  Exam:  Physical Exam  Vitals and nursing note reviewed.   Constitutional:       Appearance: Normal appearance. She is well-developed.   Cardiovascular:      Rate and Rhythm: Normal rate.   Pulmonary:      Effort: Pulmonary effort is normal. No respiratory distress.   Abdominal:      General: There is no distension.      Palpations: Abdomen is soft.      Tenderness: There is no abdominal tenderness.      Comments: Incision healing well   Genitourinary:     Exam position: Supine.   Skin:     General: Skin is warm and dry.   Neurological:      Mental Status: She is oriented to person, place, and time.       ASSESSMENT:       ICD-10-CM ICD-9-CM    1. Urinary incontinence in female  R32 788.30 Ambulatory referral/consult to Urology      2. Dizziness  R42 780.4       3. Lower abdominal pain  R10.30 789.09 US Abdomen Complete        Plan:      Doreen was seen today for follow-up.    Diagnoses and all orders for this visit:    Urinary incontinence in female  -     Ambulatory referral/consult to Urology; Future    Dizziness    Lower abdominal pain  -     US Abdomen Complete; Future        Orders Placed This Encounter   Procedures    US Abdomen Complete    Ambulatory referral/consult to Urology       Follow up in about 4 weeks (around 2/19/2024) for Follow up.    Counseling time: 30 minutes    Fe Armijo

## 2024-02-07 ENCOUNTER — HOSPITAL ENCOUNTER (OUTPATIENT)
Dept: RADIOLOGY | Facility: OTHER | Age: 39
Discharge: HOME | End: 2024-02-07
Attending: OBSTETRICS & GYNECOLOGY
Payer: MEDICAID

## 2024-02-07 DIAGNOSIS — R10.30 LOWER ABDOMINAL PAIN: ICD-10-CM

## 2024-02-07 PROCEDURE — 76700 US EXAM ABDOM COMPLETE: CPT | Mod: TC

## 2024-02-07 PROCEDURE — 76700 US EXAM ABDOM COMPLETE: CPT | Mod: 26,,, | Performed by: RADIOLOGY

## 2024-02-07 RX ORDER — DICLOXACILLIN SODIUM 250 MG/1
500 CAPSULE ORAL 4 TIMES DAILY
Qty: 80 CAPSULE | Refills: 0 | OUTPATIENT
Start: 2024-02-07 | End: 2024-02-17

## 2024-05-07 ENCOUNTER — OFFICE VISIT (OUTPATIENT)
Dept: NEUROLOGY | Facility: CLINIC | Age: 39
End: 2024-05-07
Payer: MEDICAID

## 2024-05-07 VITALS
BODY MASS INDEX: 28.91 KG/M2 | HEART RATE: 77 BPM | HEIGHT: 65 IN | WEIGHT: 173.5 LBS | DIASTOLIC BLOOD PRESSURE: 90 MMHG | SYSTOLIC BLOOD PRESSURE: 146 MMHG

## 2024-05-07 DIAGNOSIS — R42 DIZZINESS AND GIDDINESS: ICD-10-CM

## 2024-05-07 DIAGNOSIS — R51.9 NONINTRACTABLE HEADACHE, UNSPECIFIED CHRONICITY PATTERN, UNSPECIFIED HEADACHE TYPE: Primary | ICD-10-CM

## 2024-05-07 DIAGNOSIS — R42 DIZZINESS: ICD-10-CM

## 2024-05-07 PROCEDURE — 3077F SYST BP >= 140 MM HG: CPT | Mod: CPTII,,,

## 2024-05-07 PROCEDURE — 1160F RVW MEDS BY RX/DR IN RCRD: CPT | Mod: CPTII,,,

## 2024-05-07 PROCEDURE — 3008F BODY MASS INDEX DOCD: CPT | Mod: CPTII,,,

## 2024-05-07 PROCEDURE — 99999 PR PBB SHADOW E&M-EST. PATIENT-LVL IV: CPT | Mod: PBBFAC,,,

## 2024-05-07 PROCEDURE — 99214 OFFICE O/P EST MOD 30 MIN: CPT | Mod: PBBFAC

## 2024-05-07 PROCEDURE — 1159F MED LIST DOCD IN RCRD: CPT | Mod: CPTII,,,

## 2024-05-07 PROCEDURE — 3080F DIAST BP >= 90 MM HG: CPT | Mod: CPTII,,,

## 2024-05-07 PROCEDURE — 99215 OFFICE O/P EST HI 40 MIN: CPT | Mod: S$PBB,,,

## 2024-05-07 RX ORDER — LANOLIN ALCOHOL/MO/W.PET/CERES
400 CREAM (GRAM) TOPICAL DAILY
Qty: 30 TABLET | Refills: 5 | Status: SHIPPED | OUTPATIENT
Start: 2024-05-07 | End: 2024-11-03

## 2024-05-07 NOTE — PROGRESS NOTES
[facility] VISIT    Chief Complaint and Duration     Chief Complaint   Patient presents with    Consult    Dizziness     Dizziness,brain fog    for 6 months.    History of Present Illness     Amber S Toussaint is a 38 y.o. right handed female with a history of multiple medical diagnoses as listed below that presents for episodes of dizziness.     Patient is accompanied on today's visit by spouse    Patient reports an onset of dizziness episodes at 37-38 weeks' gestation.  This timeframe was 6 months ago.  Patient reports 3 days prior to  she was admitted into the hospital for 3 days due to dizziness feeling like the room is spinning with associated nausea and vomiting and blacking out.  After that hospital stay and delivery of her daughter patient reports she has not had any episodes of that nature her current symptoms that started at the beginning of this year are described as feeling off balance in the head, brain fog, feeling fuzzy, cloudy, and like there is a buzzing or vibration in my head.  Patient states it is hard for me to explain Patient does report a history of migraines but at this point she has not had any migraines in the last 4 months has not occasional headache in which she takes Tylenol for.     Patient also reports that at times she could have a muffled or distorted sound in her ears bilateral ears are affected.  Patient denies any current nausea vomiting, difficulty walking or speaking, loss of bowel or bladder control.  Patient also reports associated symptoms of brain fog is losing her train of thought.  Symptoms are spontaneous and intermittent happening 2 to 3 times a day and self resolves with time.  Patient does not report any body positional hip positional change that provokes current symptoms reported.  Patient is independent in all ADLs and IADLs including driving patient reports episode has happened while driving but she does not feel unsafe or  needed to pull over at that time.  Patient states that symptoms can occur predominantly early in the morning or very late at night.    Patient does report several hours of screen time a day.  She wears glasses no contacts has not had a recent eye examination postpartum.  Patient reports she has increased insomnia, anxiety and stressed with a .  She reports she can occasionally see floaters but does not have any visual disturbance at this time and she is currently nursing.  Patient did follow up with an ENT at an outside facility she states that they did a hearing test and all of those results were within normal limits.  Patient also states she has a history of Graves disease and being followed by endocrinologist at an outside facility. She has also had an MRI performed at [facility]. Patient reports these symptoms are negatively effecting her quality of life.      During hospital stay patient had brain and neck imaging.  Those images were unremarkable for a central cause of her dizziness.     Review of patient's allergies indicates:   Allergen Reactions    Vinyl ether Hives     Current Outpatient Medications   Medication Sig Dispense Refill    ferrous sulfate 325 (65 FE) MG EC tablet Take 1 tablet (325 mg total) by mouth once daily. 90 tablet 3    ergocalciferol (ERGOCALCIFEROL) 50,000 unit Cap Take 1 capsule by mouth every 7 days. (Patient not taking: Reported on 2024)      HYDROcodone-acetaminophen (NORCO) 5-325 mg per tablet Take 1 tablet by mouth every 6 (six) hours as needed for Pain. (Patient not taking: Reported on 2024) 40 tablet 0    ibuprofen (ADVIL,MOTRIN) 800 MG tablet Take 1 tablet (800 mg total) by mouth every 6 (six) hours. (Patient not taking: Reported on 2024) 40 tablet 0    magnesium oxide (MAG-OX) 400 mg (241.3 mg magnesium) tablet Take 1 tablet (400 mg total) by mouth once daily. 30 tablet 5    ONETOUCH DELICA PLUS LANCET 33 gauge Misc use TO test blood glucose FOUR TIMES DAILY  (Patient not taking: Reported on 2024)      ONETOUCH ULTRA TEST Strp use TO test blood glucose FOUR TIMES DAILY (Patient not taking: Reported on 2024)      ONETOUCH ULTRA2 METER Misc SMARTSIG:Via Meter Daily (Patient not taking: Reported on 2024)       No current facility-administered medications for this visit.       Medical History     Past Medical History:   Diagnosis Date    Graves disease 2013    Iron deficiency anemia 2021     Past Surgical History:   Procedure Laterality Date    BREAST SURGERY      Cysts removed     SECTION       SECTION N/A 2023    Procedure:  SECTION;  Surgeon: Fe Armijo MD;  Location: [facility] OR;  Service: OB/GYN;  Laterality: N/A;    DIAGNOSTIC LAPAROSCOPY N/A 2021    Procedure: LAPAROSCOPY, DIAGNOSTIC, possible salpingostomy/ salpingectomy/  D& C;  Surgeon: Cheri Hunter MD;  Location: [facility];  Service: OB/GYN;  Laterality: N/A;    DILATION AND CURETTAGE OF UTERUS  2021    Procedure: DILATION AND CURETTAGE, UTERUS;  Surgeon: Cheri Hunter MD;  Location: [facility];  Service: OB/GYN;;    fibroid removal  ,     breast    HEMANGIOMA EXCISION      right thigh    LAPAROSCOPIC SALPINGECTOMY Right 2021    Procedure: SALPINGECTOMY, LAPAROSCOPIC;  Surgeon: Cheri Hunter MD;  Location: [facility];  Service: OB/GYN;  Laterality: Right;     Family History   Problem Relation Name Age of Onset    Pacemaker/defibrilator Mother      Hypertension Mother      Kidney failure Mother      Hypertension Father      Throat cancer Father      Breast cancer Maternal Grandmother      Hypertension Maternal Grandmother      Colon cancer Paternal Grandfather       labor Neg Hx      Stroke Neg Hx      Diabetes Neg Hx       Social History     Socioeconomic History    Marital status:    Tobacco Use    Smoking status: Never    Smokeless tobacco: Never   Substance and Sexual Activity    Alcohol use: Not Currently      Comment: occasional    Drug use: No    Sexual activity: Yes     Partners: Male     Social Determinants of Health     Financial Resource Strain: Patient Declined (8/13/2023)    Received from [facility] [employer] [facility] [employer]    Overall Financial Resource Strain (CARDIA)     Difficulty of Paying Living Expenses: Patient declined   Food Insecurity: Patient Declined (8/13/2023)    Received from [facility] [employer] [facility] [employer]    Hunger Vital Sign     Worried About Running Out of Food in the Last Year: Patient declined     Ran Out of Food in the Last Year: Patient declined   Transportation Needs: Patient Declined (8/13/2023)    Received from Dr. TATTOFF [facility] [employer]    PRAPARE - Transportation     Lack of Transportation (Medical): Patient declined     Lack of Transportation (Non-Medical): Patient declined   Physical Activity: Sufficiently Active (8/13/2023)    Received from [facility] [employer] [facility] [employer]    Exercise Vital Sign     Days of Exercise per Week: 4 days     Minutes of Exercise per Session: 60 min   Stress: No Stress Concern Present (8/13/2023)    Received from [facility] [employer] [facility] [employer]    [employer] - Occupational Stress Questionnaire     Feeling of Stress : Not at all   Housing Stability: Unknown (8/13/2023)    Received from Dr. TATTOFF [facility] [employer]    Housing Stability Vital Sign     Unable to Pay for Housing in the Last Year: No     In the last 12 months, was there a time when you did not have a steady place to sleep or slept in a shelter (including now)?: No       Exam     Vitals:    05/07/24 1120   BP: (!) 146/90   Pulse: 77      Physical Exam:  General: Not in acute distress. Not ill-appearing.   HENT: Normocephalic and atraumatic. Moist mucous membranes.  Eyes: Conjunctivae normal.   Pulmonary: Pulmonary effort is normal.   Abdominal: Abdomen is soft and flat.   Skin: Skin is warm and dry. No rashes.   Psychiatric: Mood normal.        Neurologic Exam   Mental status: oriented to person,  place, and time  Attention: Normal. Concentration: normal.  Speech: speech is normal.  Cranial Nerves: PERRL, EOMI intact, V1-V3 Facial sensation intact. Symmetric facies. Hearing grossly intact. Palate and uvula midline, symmetric. No tongue deviation. Trapezius strength intact.     Motor exam: bulk and tone normal. Strength 5/5 in bilateral upper extremities: deltoids, biceps, triceps, wrist flexion/extension, finger abduction/adduction. Strength 5/5 in bilateral lower extremities: hip flexion/extension, thigh adduction/abduction, knee flexion/extension, dorsiflexion/plantarflexion, foot eversion/inversion.    Reflexes: 2+ in bilateral upper extremities: biceps and brachiaradialis, 2+ in bilateral lower extremities: patellar and achilles  Plantar reflex: normal  Mars's/Clonus: negative    Sensory exam: light touch intact    Gait exam: normal  Romberg: negative  Coordination: normal    Tremor: none  Cogwheel rigidity: none    Labs and Imaging     Labs: reviewed  No results found for this or any previous visit (from the past 24 hour(s)).    CMP    Imaging:   I have personally reviewed the images performed.     HEAD CT:  11/8/23  No acute intracranial findings     BRAIN MRI 11/19/23 No evidence for acute infarction or hydrocephalus.   MRV Brain 11/21/23 The venous sinuses appear patent without evidence of venous thrombosis     Other procedures: reviewed    Assessment and Plan     Problem List Items Addressed This Visit          Other    Dizziness     Other Visit Diagnoses       Nonintractable headache, unspecified chronicity pattern, unspecified headache type    -  Primary    Relevant Medications    magnesium oxide (MAG-OX) 400 mg (241.3 mg magnesium) tablet    Dizziness and giddiness        Relevant Orders    Ambulatory referral/consult to Cardiology    Ambulatory referral/consult to Ophthalmology    TSH    T4, FREE    CTA Head and Neck (xpd)    COMPREHENSIVE METABOLIC PANEL          Ms. Rutledgeaint is a  38-year-old female new to me who presents to clinic for evaluation and recommendations for dizziness.  Patient reports an onset of dizziness 6 months ago when she was 37/38 weeks antepartum in which she was hospitalized with associated symptoms of nausea and vomiting and blacking out.  Imaging was performed during the hospital stay those imaging of brain and neck was unremarkable.  Since delivery patient reports at the beginning of this year she began to have symptoms that are hard to explain.  Patient characterizes symptoms as feeling off balance in the head, brain fog, feeling fuzzy, cloudy, and like there is a buzzing or vibration in my head. Patient also reports that at times she could have a muffled or distorted sound in her ears bilateral ears are affected.  Patient denies any current nausea vomiting, difficulty walking or speaking, loss of bowel or bladder control.      Patient also reports associated symptoms of brain fog is losing her train of thought.  Symptoms are spontaneous and intermittent happening 2 to 3 times a day and self resolves with time.  Patient does not report any body positional hip positional change that provokes current symptoms reported.  Patient is independent in all ADLs and IADLs including driving. Patient has followed up with ENT and outside facility she reports that testing and assessment was unremarkable.  Patient also states that she has had a recent history of anxiety with increased anxiety, stress and insomnia. Physical examination on today is unremarkable patient denies any other focal neurologic symptoms.  Patient wears glasses with no recent annual eye exam completed.  Patient does have a history of Graves disease is seen by endocrinologist at an outside facility.  As a follow-up patient had MRI completed at [facility] on yesterday results have not been given to patient as of today.    Given patient's presentation symptomatology and reports. I have a low suspicion for a central  cause of dizziness.  MRI/MRV reviewed with patient. Requested patient obtain imaging from recent MRI and ENT report for clinician to review.  Discussed other causes of a peripheral form of dizziness that can come from a vestibular disorder such as an imbalance of the inner ear.  Also discussed non vestibular causes of dizziness which can be linked to a wide array of problems such as blood flow irregularities and or other cardiac issues.  Once patient provides additional health information I will review these.  It is reasonable to order additional imaging.  I will place orders for CTA as this diagnostic has not been performed.  CMP pre lab ordered.  I will also place ambulatory referral to Cardiology and Ophthalmology to address other non vestibular causes of dizziness.  Patient does have a history of Graves disease discussed with patient scheduling follow up with endocrinologist to report current symptoms. We will check TSH and free T4.    Sleep Hygiene: Poor sleep has a negative effect on cognition and mood. Several strategies have been shown to improve sleep:   Caffeine intake in the afternoon and evening, as well as stuffing oneself at supper, can decrease the quality of restful sleep throughout the night.   Bedtime and wake-up times should be consistent every night and morning so the body becomes used to a single routine, even on the weekends.  Engage in daily physical activity, but not 2-3 hours before bedtime.   No technology use (television, computer, iPad) 1-2 hours before bed.   Have a wind down routine (e.g., soft lights in the house, bath before bed, reduced fluid intake, songs, reading, less noise) to promote sleep readiness.   Visit the www.sleepfoundation.org for more strategies.    Discussed multifactorial nature of behavioral concerns that can affect non vestibular causes educated on lifestyle modifications including anxiety/stress, proper hydration, diet, sleep hygiene, and exercise were  discussed.      Continue home medications/regime and follow up with PCP for surveillance and long-term management     Questions and concerns were sought and answered to the patient's stated verbal satisfaction. The patient verbalizes understanding and agreement with the above stated treatment plan.      KLEBER Lucero  [facility]  [facility]     [phone]    Follow-up: Follow up After CTA, cardiology and ophthalmology exams. and for headache evaluation.    Time spent on this encounter: 76 minutes. This includes face to face time and non-face to face time preparing to see the patient (eg, review of tests), obtaining and/or reviewing separately obtained history, documenting clinical information in the electronic or other health record, independently interpreting results and communicating results to the patient/family/caregiver, or care coordinator.     This note was created by combination of typed  and M-Modal dictation. Transcription and phonetic errors may be present.  If there are any questions, please contact me.

## 2024-05-09 ENCOUNTER — HOSPITAL ENCOUNTER (OUTPATIENT)
Dept: RADIOLOGY | Facility: HOSPITAL | Age: 39
Discharge: HOME | End: 2024-05-09
Payer: MEDICAID

## 2024-05-09 DIAGNOSIS — R42 DIZZINESS AND GIDDINESS: Primary | ICD-10-CM

## 2024-05-09 DIAGNOSIS — R42 DIZZINESS AND GIDDINESS: ICD-10-CM

## 2024-05-15 ENCOUNTER — HOSPITAL ENCOUNTER (OUTPATIENT)
Dept: RADIOLOGY | Facility: HOSPITAL | Age: 39
Discharge: HOME | End: 2024-05-15
Payer: MEDICAID

## 2024-05-15 PROCEDURE — 70498 CT ANGIOGRAPHY NECK: CPT | Mod: 26,,, | Performed by: RADIOLOGY

## 2024-05-15 PROCEDURE — 70496 CT ANGIOGRAPHY HEAD: CPT | Mod: 26,,, | Performed by: RADIOLOGY

## 2024-05-15 PROCEDURE — 70496 CT ANGIOGRAPHY HEAD: CPT | Mod: TC

## 2024-05-15 PROCEDURE — 70498 CT ANGIOGRAPHY NECK: CPT | Mod: TC

## 2024-05-15 PROCEDURE — 25500020 PHARM REV CODE 255

## 2024-05-15 RX ADMIN — IOHEXOL 75 ML: 350 INJECTION, SOLUTION INTRAVENOUS at 11:05

## 2024-05-16 DIAGNOSIS — B37.9 YEAST INFECTION: Primary | ICD-10-CM

## 2024-05-16 RX ORDER — FLUCONAZOLE 150 MG/1
150 TABLET ORAL DAILY
Qty: 1 TABLET | Refills: 0 | Status: SHIPPED | OUTPATIENT
Start: 2024-05-16 | End: 2024-05-17

## 2024-08-09 ENCOUNTER — OFFICE VISIT (OUTPATIENT)
Dept: CARDIOLOGY | Facility: CLINIC | Age: 39
End: 2024-08-09
Payer: MEDICAID

## 2024-08-09 DIAGNOSIS — R42 DIZZINESS AND GIDDINESS: ICD-10-CM

## 2024-08-09 DIAGNOSIS — R00.2 PALPITATIONS: Primary | ICD-10-CM

## 2024-08-09 DIAGNOSIS — I49.3 PVC (PREMATURE VENTRICULAR CONTRACTION): ICD-10-CM

## 2024-08-09 PROCEDURE — 99213 OFFICE O/P EST LOW 20 MIN: CPT | Mod: PBBFAC | Performed by: INTERNAL MEDICINE

## 2024-08-09 PROCEDURE — 99999 PR PBB SHADOW E&M-EST. PATIENT-LVL III: CPT | Mod: PBBFAC,,, | Performed by: INTERNAL MEDICINE

## 2024-08-09 RX ORDER — FERROUS SULFATE 325(65) MG
325 TABLET, DELAYED RELEASE (ENTERIC COATED) ORAL
COMMUNITY

## 2024-08-21 ENCOUNTER — HOSPITAL ENCOUNTER (OUTPATIENT)
Dept: CARDIOLOGY | Facility: OTHER | Age: 39
Discharge: HOME | End: 2024-08-21
Attending: INTERNAL MEDICINE
Payer: MEDICAID

## 2024-08-21 VITALS
SYSTOLIC BLOOD PRESSURE: 142 MMHG | HEART RATE: 66 BPM | DIASTOLIC BLOOD PRESSURE: 90 MMHG | WEIGHT: 175 LBS | HEIGHT: 65 IN | BODY MASS INDEX: 29.16 KG/M2

## 2024-08-21 DIAGNOSIS — R00.2 PALPITATIONS: ICD-10-CM

## 2024-08-21 DIAGNOSIS — R42 DIZZINESS AND GIDDINESS: ICD-10-CM

## 2024-08-21 LAB
ASCENDING AORTA: 2.73 CM
AV INDEX (PROSTH): 0.71
AV MEAN GRADIENT: 4 MMHG
AV PEAK GRADIENT: 7 MMHG
AV VALVE AREA BY VELOCITY RATIO: 2.31 CM²
AV VALVE AREA: 2.48 CM²
AV VELOCITY RATIO: 0.66
BSA FOR ECHO PROCEDURE: 1.91 M2
CV ECHO LV RWT: 0.32 CM
DOP CALC AO PEAK VEL: 1.36 M/S
DOP CALC AO VTI: 26.2 CM
DOP CALC LVOT AREA: 3.5 CM2
DOP CALC LVOT DIAMETER: 2.11 CM
DOP CALC LVOT PEAK VEL: 0.9 M/S
DOP CALC LVOT STROKE VOLUME: 65.01 CM3
DOP CALC RVOT PEAK VEL: 0.6 M/S
DOP CALCLVOT PEAK VEL VTI: 18.6 CM
E WAVE DECELERATION TIME: 209.1 MSEC
E/A RATIO: 0.79
E/E' RATIO: 4.42 M/S
ECHO LV POSTERIOR WALL: 0.77 CM (ref 0.6–1.1)
FRACTIONAL SHORTENING: 34 % (ref 28–44)
INTERVENTRICULAR SEPTUM: 0.86 CM (ref 0.6–1.1)
IVC DIAMETER: 2.21 CM
LA MAJOR: 5.39 CM
LA MINOR: 5.13 CM
LA WIDTH: 3.1 CM
LEFT ATRIUM AREA SYSTOLIC (APICAL 2 CHAMBER): 19.3 CM2
LEFT ATRIUM AREA SYSTOLIC (APICAL 4 CHAMBER): 17.37 CM2
LEFT ATRIUM SIZE: 3.59 CM
LEFT ATRIUM VOLUME INDEX MOD: 26.8 ML/M2
LEFT ATRIUM VOLUME INDEX: 26.6 ML/M2
LEFT ATRIUM VOLUME MOD: 50.2 CM3
LEFT ATRIUM VOLUME: 49.73 CM3
LEFT INTERNAL DIMENSION IN SYSTOLE: 3.18 CM (ref 2.1–4)
LEFT VENTRICLE DIASTOLIC VOLUME INDEX: 58.12 ML/M2
LEFT VENTRICLE DIASTOLIC VOLUME: 108.69 ML
LEFT VENTRICLE END SYSTOLIC VOLUME APICAL 2 CHAMBER: 57.71 ML
LEFT VENTRICLE END SYSTOLIC VOLUME APICAL 4 CHAMBER: 43.09 ML
LEFT VENTRICLE MASS INDEX: 70 G/M2
LEFT VENTRICLE SYSTOLIC VOLUME INDEX: 21.6 ML/M2
LEFT VENTRICLE SYSTOLIC VOLUME: 40.42 ML
LEFT VENTRICULAR INTERNAL DIMENSION IN DIASTOLE: 4.82 CM (ref 3.5–6)
LEFT VENTRICULAR MASS: 130.69 G
LV LATERAL E/E' RATIO: 4.08 M/S
LV SEPTAL E/E' RATIO: 4.82 M/S
LVED V (TEICH): 108.69 ML
LVES V (TEICH): 40.42 ML
LVOT MG: 2.09 MMHG
LVOT MV: 0.7 CM/S
MV PEAK A VEL: 0.67 M/S
MV PEAK E VEL: 0.53 M/S
MV STENOSIS PRESSURE HALF TIME: 60.64 MS
MV VALVE AREA P 1/2 METHOD: 3.63 CM2
PISA TR MAX VEL: 2.04 M/S
PULM VEIN S/D RATIO: 1.24
PV PEAK D VEL: 0.41 M/S
PV PEAK GRADIENT: 3 MMHG
PV PEAK S VEL: 0.51 M/S
PV PEAK VELOCITY: 0.81 M/S
RA MAJOR: 4.6 CM
RA PRESSURE ESTIMATED: 3 MMHG
RA WIDTH: 3.6 CM
RIGHT VENTRICLE DIASTOLIC BASEL DIMENSION: 3.7 CM
RV TB RVSP: 5 MMHG
RV TISSUE DOPPLER FREE WALL SYSTOLIC VELOCITY 1 (APICAL 4 CHAMBER VIEW): 14.01 CM/S
SINUS: 3 CM
STJ: 2.5 CM
TDI LATERAL: 0.13 M/S
TDI SEPTAL: 0.11 M/S
TDI: 0.12 M/S
TR MAX PG: 17 MMHG
TRICUSPID ANNULAR PLANE SYSTOLIC EXCURSION: 2 CM
TV REST PULMONARY ARTERY PRESSURE: 20 MMHG
Z-SCORE OF LEFT VENTRICULAR DIMENSION IN END DIASTOLE: -0.78
Z-SCORE OF LEFT VENTRICULAR DIMENSION IN END SYSTOLE: -0.09

## 2024-08-21 PROCEDURE — 93306 TTE W/DOPPLER COMPLETE: CPT

## 2024-08-21 PROCEDURE — 93226 XTRNL ECG REC<48 HR SCAN A/R: CPT

## 2024-08-21 PROCEDURE — 93306 TTE W/DOPPLER COMPLETE: CPT | Mod: 26,,, | Performed by: INTERNAL MEDICINE

## 2024-09-19 ENCOUNTER — OFFICE VISIT (OUTPATIENT)
Dept: CARDIOLOGY | Facility: CLINIC | Age: 39
End: 2024-09-19
Payer: MEDICAID

## 2024-09-19 VITALS
DIASTOLIC BLOOD PRESSURE: 82 MMHG | BODY MASS INDEX: 29.17 KG/M2 | WEIGHT: 175.31 LBS | OXYGEN SATURATION: 97 % | SYSTOLIC BLOOD PRESSURE: 118 MMHG | HEART RATE: 72 BPM

## 2024-09-19 DIAGNOSIS — R00.2 PALPITATIONS: Primary | ICD-10-CM

## 2024-09-19 PROCEDURE — 3079F DIAST BP 80-89 MM HG: CPT | Mod: CPTII,,, | Performed by: INTERNAL MEDICINE

## 2024-09-19 PROCEDURE — 1159F MED LIST DOCD IN RCRD: CPT | Mod: CPTII,,, | Performed by: INTERNAL MEDICINE

## 2024-09-19 PROCEDURE — 99999 PR PBB SHADOW E&M-EST. PATIENT-LVL III: CPT | Mod: PBBFAC,,, | Performed by: INTERNAL MEDICINE

## 2024-09-19 PROCEDURE — 99213 OFFICE O/P EST LOW 20 MIN: CPT | Mod: PBBFAC | Performed by: INTERNAL MEDICINE

## 2024-09-19 PROCEDURE — 3008F BODY MASS INDEX DOCD: CPT | Mod: CPTII,,, | Performed by: INTERNAL MEDICINE

## 2024-09-19 PROCEDURE — 99213 OFFICE O/P EST LOW 20 MIN: CPT | Mod: S$PBB,,, | Performed by: INTERNAL MEDICINE

## 2024-09-19 PROCEDURE — 3074F SYST BP LT 130 MM HG: CPT | Mod: CPTII,,, | Performed by: INTERNAL MEDICINE

## 2024-09-19 NOTE — PROGRESS NOTES
Cardiology    2024  9:07 AM    Problem list  Patient Active Problem List   Diagnosis    Pinguecula of left eye    History of ectopic pregnancy    Elevated blood-pressure reading without diagnosis of hypertension    Excessive and frequent menstruation    Graves disease    Goiter    Iron deficiency anemia    Migraine    Overweight with body mass index (BMI) 25.0-29.9    Vitamin D deficiency    Chronic pain of both shoulders    Amenorrhea    Positive pregnancy test    Supervision of other normal pregnancy, antepartum    Diet controlled gestational diabetes mellitus (GDM) in third trimester    History of  delivery, currently pregnant    Current boyce pregnancy with history of congenital heart disease in prior child, antepartum    Dizziness    Status post repeat low transverse  section    Palpitations    PVC (premature ventricular contraction)       CC:  Follow-up    HPI:  She is here for follow-up.  Echocardiogram was done which showed normal function and no structural abnormalities.  Holter monitor shows sinus rhythm with occasional PVCs.  She has no new complaints today.    Medications  Current Outpatient Medications   Medication Sig Dispense Refill    ferrous sulfate 325 (65 FE) MG EC tablet Take 325 mg by mouth 3 (three) times daily with meals.      magnesium oxide (MAG-OX) 400 mg (241.3 mg magnesium) tablet Take 1 tablet (400 mg total) by mouth once daily. 30 tablet 5    ergocalciferol (ERGOCALCIFEROL) 50,000 unit Cap Take 1 capsule by mouth every 7 days. (Patient not taking: Reported on 2024)      HYDROcodone-acetaminophen (NORCO) 5-325 mg per tablet Take 1 tablet by mouth every 6 (six) hours as needed for Pain. (Patient not taking: Reported on 2024) 40 tablet 0    ibuprofen (ADVIL,MOTRIN) 800 MG tablet Take 1 tablet (800 mg total) by mouth every 6 (six) hours. (Patient not taking: Reported on 2024) 40 tablet 0    ONETOUCH DELICA PLUS LANCET 33 gauge Misc use TO test  blood glucose FOUR TIMES DAILY (Patient not taking: Reported on 2024)      ONETOUCH ULTRA TEST Strp use TO test blood glucose FOUR TIMES DAILY (Patient not taking: Reported on 2024)      ONETOUCH ULTRA2 METER Misc SMARTSIG:Via Meter Daily (Patient not taking: Reported on 2024)       No current facility-administered medications for this visit.      Prior to Admission medications    Medication Sig Start Date End Date Taking? Authorizing Provider   ferrous sulfate 325 (65 FE) MG EC tablet Take 325 mg by mouth 3 (three) times daily with meals.   Yes Provider, Historical   magnesium oxide (MAG-OX) 400 mg (241.3 mg magnesium) tablet Take 1 tablet (400 mg total) by mouth once daily. 5/7/24 11/3/24 Yes Portia Rowley NP   ergocalciferol (ERGOCALCIFEROL) 50,000 unit Cap Take 1 capsule by mouth every 7 days.  Patient not taking: Reported on 2024   Provider, Historical   HYDROcodone-acetaminophen (NORCO) 5-325 mg per tablet Take 1 tablet by mouth every 6 (six) hours as needed for Pain.  Patient not taking: Reported on 23   Fe Armijo MD   ibuprofen (ADVIL,MOTRIN) 800 MG tablet Take 1 tablet (800 mg total) by mouth every 6 (six) hours.  Patient not taking: Reported on 23   Fe Armijo MD   ONETOUCH DELICA PLUS LANCET 33 gauge Misc use TO test blood glucose FOUR TIMES DAILY  Patient not taking: Reported on 2024 11/15/23   Provider, Historical   ONETOUCH ULTRA TEST Strp use TO test blood glucose FOUR TIMES DAILY  Patient not taking: Reported on 2024 11/15/23   Provider, Historical   ONETOUCH ULTRA2 METER Misc SMARTSIG:Via Meter Daily  Patient not taking: Reported on 2024 10/24/23   Provider, Historical         History  Past Medical History:   Diagnosis Date    Graves disease 2013    Iron deficiency anemia 2021     Past Surgical History:   Procedure Laterality Date    BREAST SURGERY      Cysts removed     SECTION        SECTION N/A 2023    Procedure:  SECTION;  Surgeon: Fe Armijo MD;  Location: [facility] L&D OR;  Service: OB/GYN;  Laterality: N/A;    DIAGNOSTIC LAPAROSCOPY N/A 2021    Procedure: LAPAROSCOPY, DIAGNOSTIC, possible salpingostomy/ salpingectomy/  D& C;  Surgeon: Cheri Hunter MD;  Location: [facility] OR;  Service: OB/GYN;  Laterality: N/A;    DILATION AND CURETTAGE OF UTERUS  2021    Procedure: DILATION AND CURETTAGE, UTERUS;  Surgeon: Cheri Hunter MD;  Location: [facility] OR;  Service: OB/GYN;;    fibroid removal  ,     breast    HEMANGIOMA EXCISION      right thigh    LAPAROSCOPIC SALPINGECTOMY Right 2021    Procedure: SALPINGECTOMY, LAPAROSCOPIC;  Surgeon: Cheri Hunter MD;  Location: [facility];  Service: OB/GYN;  Laterality: Right;     Social History     Socioeconomic History    Marital status:    Tobacco Use    Smoking status: Never    Smokeless tobacco: Never   Substance and Sexual Activity    Alcohol use: Not Currently     Comment: occasional    Drug use: No    Sexual activity: Yes     Partners: Male     Social Determinants of Health     Financial Resource Strain: Patient Declined (2023)    [facility] [facility]    Overall Financial Resource Strain (CARDIA)     Difficulty of Paying Living Expenses: Patient declined   Food Insecurity: Patient Declined (2023)    [place] [facility] [employer] [facility]    Hunger Vital Sign     Worried About Running Out of Food in the Last Year: Patient declined     Ran Out of Food in the Last Year: Patient declined   Transportation Needs: Patient Declined (2023)    [place] [facility] [employer] [facility]    PRAPARE - Transportation     Lack of Transportation (Medical): Patient declined     Lack of Transportation (Non-Medical): Patient declined   Physical Activity: Sufficiently Active (2023)    [place] [facility] [employer] [facility]    Exercise Vital Sign     Days of Exercise per  Week: 4 days     Minutes of Exercise per Session: 60 min   Stress: No Stress Concern Present (8/13/2023)    [facility]    [employer] [place] [employer] - Occupational Stress Questionnaire     Feeling of Stress : Not at all   Housing Stability: Unknown (8/13/2023)    [facility]    Housing Stability Vital Sign     Unable to Pay for Housing in the Last Year: No     Unstable Housing in the Last Year: No         Allergies  Review of patient's allergies indicates:   Allergen Reactions    Vinyl ether Hives         Review of Systems   Review of Systems   Constitutional: Negative for decreased appetite, fever and weight loss.   HENT:  Negative for congestion and nosebleeds.    Eyes:  Negative for double vision, vision loss in left eye, vision loss in right eye and visual disturbance.   Cardiovascular:  Negative for chest pain, claudication, cyanosis, dyspnea on exertion, irregular heartbeat, leg swelling, near-syncope, orthopnea, palpitations, paroxysmal nocturnal dyspnea and syncope.   Respiratory:  Negative for cough, hemoptysis, shortness of breath, sleep disturbances due to breathing, snoring, sputum production and wheezing.    Endocrine: Negative for cold intolerance and heat intolerance.   Skin:  Negative for nail changes and rash.   Musculoskeletal:  Negative for joint pain, muscle cramps, muscle weakness and myalgias.   Gastrointestinal:  Negative for change in bowel habit, heartburn, hematemesis, hematochezia, hemorrhoids and melena.   Neurological:  Negative for dizziness, focal weakness and headaches.         Physical Exam  Wt Readings from Last 1 Encounters:   09/19/24 79.5 kg (175 lb 4.8 oz)     BP Readings from Last 3 Encounters:   09/19/24 118/82   08/21/24 (!) 142/90   08/09/24 (!) (P) 142/90     Pulse Readings from Last 1 Encounters:   09/19/24 72     Body mass index is 29.17 kg/m².    Physical Exam  Vitals reviewed.   Constitutional:        Appearance: She is well-developed.   HENT:      Head: Atraumatic.   Eyes:      General: No scleral icterus.  Neck:      Vascular: Normal carotid pulses. No carotid bruit, hepatojugular reflux or JVD.   Cardiovascular:      Rate and Rhythm: Normal rate and regular rhythm.      Chest Wall: PMI is not displaced.      Pulses: Intact distal pulses.           Carotid pulses are 2+ on the right side and 2+ on the left side.       Radial pulses are 2+ on the right side and 2+ on the left side.        Dorsalis pedis pulses are 2+ on the right side and 2+ on the left side.      Heart sounds: Normal heart sounds, S1 normal and S2 normal. No murmur heard.     No friction rub.   Pulmonary:      Effort: Pulmonary effort is normal. No respiratory distress.      Breath sounds: Normal breath sounds. No stridor. No wheezing or rales.   Chest:      Chest wall: No tenderness.   Abdominal:      General: Bowel sounds are normal.      Palpations: Abdomen is soft.   Musculoskeletal:      Cervical back: Neck supple. No edema.   Skin:     General: Skin is warm and dry.      Nails: There is no clubbing.   Neurological:      Mental Status: She is alert and oriented to person, place, and time.   Psychiatric:         Behavior: Behavior normal.         Thought Content: Thought content normal.             Assessment  1. Palpitations  Resolved.  Likely due to PVCs.        Plan and Discussion  Discussed her echocardiogram and Holter monitor results.  Reassurance provided that she does not need any medications for her PVCs.  Recommend to avoid stimulants such as caffeine and maintain adequate hydration.  No further cardiac testing at this time.    Follow Up  As needed      Tee Quach MD, F.A.C.C, F.S.C.A.I.      Total professional time spent for the encounter: 20 minutes  Time was spent preparing to see the patient, reviewing results of prior testing, obtaining and/or reviewing separately obtained history, performing a medically appropriate  examination and interview, counseling and educating the patient/family, ordering medications/tests/procedures, referring and communicating with other health care professionals, documenting clinical information in the electronic health record, and independently interpreting results.    Disclaimer: This document was created using voice recognition software ([employer] Fluency Direct). Although it may be edited, this document may contain errors related to incorrect recognition of the spoken word. Please call the physician if clarification is needed.

## 2024-10-14 PROBLEM — O09.299 CURRENT SINGLETON PREGNANCY WITH HISTORY OF CONGENITAL HEART DISEASE IN PRIOR CHILD, ANTEPARTUM: Status: RESOLVED | Noted: 2023-11-03 | Resolved: 2024-10-14

## 2024-12-03 ENCOUNTER — OFFICE VISIT (OUTPATIENT)
Dept: OPTOMETRY | Facility: CLINIC | Age: 39
End: 2024-12-03
Payer: MEDICAID

## 2024-12-03 DIAGNOSIS — R42 DIZZINESS AND GIDDINESS: ICD-10-CM

## 2024-12-03 DIAGNOSIS — Z53.20 PROCEDURE NOT CARRIED OUT BECAUSE OF PATIENT'S DECISION: Primary | ICD-10-CM

## 2024-12-03 PROCEDURE — 99212 OFFICE O/P EST SF 10 MIN: CPT | Mod: PBBFAC | Performed by: OPTOMETRIST

## 2024-12-03 PROCEDURE — 99999 PR PBB SHADOW E&M-EST. PATIENT-LVL II: CPT | Mod: PBBFAC,,, | Performed by: OPTOMETRIST

## 2024-12-03 PROCEDURE — 99499 UNLISTED E&M SERVICE: CPT | Mod: S$PBB,,, | Performed by: OPTOMETRIST

## 2024-12-05 ENCOUNTER — TELEPHONE (OUTPATIENT)
Dept: OPTOMETRY | Facility: CLINIC | Age: 39
End: 2024-12-05
Payer: MEDICAID

## 2024-12-05 NOTE — TELEPHONE ENCOUNTER
Pt called and was very upset that her AVS showed the procedure was denied to do patients decision.  She states that she had to leave her visit because she had another appointment that day.  She denied the dilation of eyes due to her breastfeeding.  She was told she can pump and dump but she stated that upset her bc they didn't ask if she had a pump and she is strictly breastfeeding.   Pt walked in [facility] and did not know she had to check in by phone at [facility]. Once she checked in she did not wait long and was taken back to do a VF test.  After the test she was brought into a room for her vision, pressure and slit lamp check.  When told she needed to be dilated she refused due to breastfeeding.  Another tech cam into the room and told her the dr said she can pump and dump and this upset her.  She waited in the exam room for the doctor and then decided she had to leave to get to another appointment. and the tech told her the dr was coming but pt states she could not wait.  Pt was upset that the dx code that was in her chart was procedure denied due to patient decision.  I offered to reschedule her with Dr Machado or another provider and explained that we need to dilate her eyes.  She denied the appointment with Dr Machado and wants to see another provider but will call back when she is not breastfeeding and can be dilated. eb

## 2025-02-12 ENCOUNTER — OFFICE VISIT (OUTPATIENT)
Dept: OBSTETRICS AND GYNECOLOGY | Facility: CLINIC | Age: 40
End: 2025-02-12
Payer: MEDICAID

## 2025-02-12 VITALS
SYSTOLIC BLOOD PRESSURE: 146 MMHG | WEIGHT: 176.94 LBS | DIASTOLIC BLOOD PRESSURE: 80 MMHG | BODY MASS INDEX: 29.44 KG/M2

## 2025-02-12 DIAGNOSIS — Z01.419 WELL WOMAN EXAM WITH ROUTINE GYNECOLOGICAL EXAM: Primary | ICD-10-CM

## 2025-02-12 DIAGNOSIS — R87.89 HIGH RISK HUMAN PAPILLOMAVIRUS (HPV) DNA TEST POSITIVE: ICD-10-CM

## 2025-02-12 PROCEDURE — 99999 PR PBB SHADOW E&M-EST. PATIENT-LVL II: CPT | Mod: PBBFAC,,, | Performed by: OBSTETRICS & GYNECOLOGY

## 2025-02-12 PROCEDURE — 3008F BODY MASS INDEX DOCD: CPT | Mod: CPTII,,, | Performed by: OBSTETRICS & GYNECOLOGY

## 2025-02-12 PROCEDURE — 88175 CYTOPATH C/V AUTO FLUID REDO: CPT | Performed by: PATHOLOGY

## 2025-02-12 PROCEDURE — 87624 HPV HI-RISK TYP POOLED RSLT: CPT | Performed by: OBSTETRICS & GYNECOLOGY

## 2025-02-12 PROCEDURE — 99212 OFFICE O/P EST SF 10 MIN: CPT | Mod: PBBFAC | Performed by: OBSTETRICS & GYNECOLOGY

## 2025-02-12 PROCEDURE — 3079F DIAST BP 80-89 MM HG: CPT | Mod: CPTII,,, | Performed by: OBSTETRICS & GYNECOLOGY

## 2025-02-12 PROCEDURE — 99395 PREV VISIT EST AGE 18-39: CPT | Mod: S$PBB,,, | Performed by: OBSTETRICS & GYNECOLOGY

## 2025-02-12 PROCEDURE — 3077F SYST BP >= 140 MM HG: CPT | Mod: CPTII,,, | Performed by: OBSTETRICS & GYNECOLOGY

## 2025-02-12 PROCEDURE — 88141 CYTOPATH C/V INTERPRET: CPT | Mod: ,,, | Performed by: PATHOLOGY

## 2025-02-12 NOTE — PROGRESS NOTES
History & Physical  Gynecology Problem Visit      SUBJECTIVE:     Chief Complaint: Annual Exam       History of Present Illness:  {OBG HPI BLOCKS:67948}      Review of patient's allergies indicates:   Allergen Reactions    Vinyl ether Hives       Past Medical History:   Diagnosis Date    Graves disease 2013    Iron deficiency anemia 2021     Past Surgical History:   Procedure Laterality Date    BREAST SURGERY      Cysts removed     SECTION       SECTION N/A 2023    Procedure:  SECTION;  Surgeon: Fe Armijo MD;  Location: [facility] L[facility]D OR;  Service: OB/GYN;  Laterality: N/A;    DIAGNOSTIC LAPAROSCOPY N/A 2021    Procedure: LAPAROSCOPY, DIAGNOSTIC, possible salpingostomy/ salpingectomy/  D& C;  Surgeon: Cheri Hunter MD;  Location: [facility] OR;  Service: OB/GYN;  Laterality: N/A;    DILATION AND CURETTAGE OF UTERUS  2021    Procedure: DILATION AND CURETTAGE, UTERUS;  Surgeon: Cheri Hunter MD;  Location: [facility] OR;  Service: OB/GYN;;    fibroid removal  ,     breast    HEMANGIOMA EXCISION      right thigh    LAPAROSCOPIC SALPINGECTOMY Right 2021    Procedure: SALPINGECTOMY, LAPAROSCOPIC;  Surgeon: Cheri Hunter MD;  Location: [facility] OR;  Service: OB/GYN;  Laterality: Right;     OB History          4    Para   2    Term   2       0    AB   2    Living   2         SAB   1    IAB   0    Ectopic   1    Multiple   0    Live Births   2               Family History   Problem Relation Name Age of Onset    Pacemaker/defibrilator Mother      Hypertension Mother      Kidney failure Mother      Hypertension Father      Throat cancer Father      Breast cancer Maternal Grandmother      Hypertension Maternal Grandmother      Pacemaker/defibrilator Maternal Grandfather      Colon cancer Paternal Grandfather      Glaucoma Other       labor Neg Hx      Stroke Neg Hx      Diabetes Neg Hx      Macular degeneration Neg  Hx       Social History     Tobacco Use    Smoking status: Never    Smokeless tobacco: Never   Substance Use Topics    Alcohol use: Not Currently     Comment: occasional    Drug use: No       No current outpatient medications on file.     No current facility-administered medications for this visit.     Review of Systems:  Review of Systems     OBJECTIVE:   Vitals:     Vitals:    02/12/25 1020   BP: (!) 146/80   Weight: 80.3 kg (176 lb 14.7 oz)        Physical Exam:  Physical Exam      ASSESSMENT:       ICD-10-CM ICD-9-CM    1. Well woman exam with routine gynecological exam  Z01.419 V72.31         Plan:      ***        Genitourinary:     Vagina: No vaginal discharge.   Skin:     General: Skin is warm and dry.   Neurological:      Mental Status: She is alert and oriented to person, place, and time.           ASSESSMENT:       ICD-10-CM ICD-9-CM    1. Well woman exam with routine gynecological exam  Z01.419 V72.31 Liquid-Based Pap Smear, Screening      HPV High Risk Genotypes, PCR      2. High risk human papillomavirus (HPV) DNA test positive  R87.89 792.9 Liquid-Based Pap Smear, Screening      HPV High Risk Genotypes, PCR        Plan:      Doreen was seen today for annual exam.    Diagnoses and all orders for this visit:    Well woman exam with routine gynecological exam  -     Liquid-Based Pap Smear, Screening  -     HPV High Risk Genotypes, PCR    High risk human papillomavirus (HPV) DNA test positive  -     Liquid-Based Pap Smear, Screening  -     HPV High Risk Genotypes, PCR        Orders Placed This Encounter   Procedures    HPV High Risk Genotypes, PCR       Follow up in about 1 year (around 2/12/2026) for Well Woman/Annual.

## 2025-02-19 LAB
FINAL PATHOLOGIC DIAGNOSIS: NORMAL
Lab: NORMAL

## 2025-02-23 ENCOUNTER — RESULTS FOLLOW-UP (OUTPATIENT)
Dept: OBSTETRICS AND GYNECOLOGY | Facility: HOSPITAL | Age: 40
End: 2025-02-23
Payer: MEDICAID

## 2025-03-25 ENCOUNTER — PROCEDURE VISIT (OUTPATIENT)
Dept: OBSTETRICS AND GYNECOLOGY | Facility: CLINIC | Age: 40
End: 2025-03-25
Payer: MEDICAID

## 2025-03-25 DIAGNOSIS — R87.89 HIGH RISK HUMAN PAPILLOMAVIRUS (HPV) DNA TEST POSITIVE: Primary | ICD-10-CM

## 2025-03-25 LAB
B-HCG UR QL: NEGATIVE
CTP QC/QA: YES

## 2025-03-25 PROCEDURE — 88305 TISSUE EXAM BY PATHOLOGIST: CPT | Mod: TC,91 | Performed by: OBSTETRICS & GYNECOLOGY

## 2025-03-25 PROCEDURE — 88305 TISSUE EXAM BY PATHOLOGIST: CPT | Mod: 26,,, | Performed by: PATHOLOGY

## 2025-03-25 PROCEDURE — 99999PBSHW POCT URINE PREGNANCY: Mod: PBBFAC,,,

## 2025-03-25 PROCEDURE — 81025 URINE PREGNANCY TEST: CPT | Mod: PBBFAC | Performed by: OBSTETRICS & GYNECOLOGY

## 2025-03-25 PROCEDURE — 57454 BX/CURETT OF CERVIX W/SCOPE: CPT | Mod: PBBFAC | Performed by: OBSTETRICS & GYNECOLOGY

## 2025-03-25 PROCEDURE — 88342 IMHCHEM/IMCYTCHM 1ST ANTB: CPT | Mod: 26,,, | Performed by: PATHOLOGY

## 2025-03-25 NOTE — PROCEDURES
Colposcopy    Date/Time: 3/25/2025 10:45 AM    Performed by: Fe Armijo MD  Authorized by: Fe Armijo MD    Consent obatined:  Prior to procedure the appropriate consent was completed and verified  Timeout:Immediately prior to procedure a time out was called to verify the correct patient, procedure, equipment, support staff and site/side marked as required  Prep:Patient was prepped and draped in the usual sterile fashion  Assistants?: No      Colposcopy Site:  Cervix  Position:  Supine  Acrowhite Lesion? Yes    Atypical Vessels: No    Transformation Zone Adequate?: Yes    Biopsy?: Yes         Location:  Cervix ((12 00))  ECC Performed?: Yes    LEEP Performed?: No     Patient tolerated the procedure well with no immediate complications.   Post-operative instructions were provided for the patient.   Patient was discharged and will follow up if any complications occur

## 2025-03-31 ENCOUNTER — RESULTS FOLLOW-UP (OUTPATIENT)
Dept: OBSTETRICS AND GYNECOLOGY | Facility: HOSPITAL | Age: 40
End: 2025-03-31

## 2025-03-31 ENCOUNTER — PATIENT MESSAGE (OUTPATIENT)
Dept: OBSTETRICS AND GYNECOLOGY | Facility: CLINIC | Age: 40
End: 2025-03-31
Payer: MEDICAID

## 2025-06-30 ENCOUNTER — HOSPITAL ENCOUNTER (EMERGENCY)
Facility: HOSPITAL | Age: 40
Discharge: HOME | End: 2025-06-30
Attending: INTERNAL MEDICINE
Payer: MEDICAID

## 2025-06-30 VITALS
TEMPERATURE: 100 F | RESPIRATION RATE: 20 BRPM | SYSTOLIC BLOOD PRESSURE: 123 MMHG | WEIGHT: 170 LBS | HEIGHT: 65 IN | HEART RATE: 100 BPM | DIASTOLIC BLOOD PRESSURE: 87 MMHG | OXYGEN SATURATION: 99 % | BODY MASS INDEX: 28.32 KG/M2

## 2025-06-30 DIAGNOSIS — B34.9 VIRAL SYNDROME: Primary | ICD-10-CM

## 2025-06-30 PROCEDURE — 87804 INFLUENZA ASSAY W/OPTIC: CPT | Mod: 59,ER

## 2025-06-30 PROCEDURE — 87635 SARS-COV-2 COVID-19 AMP PRB: CPT | Mod: ER | Performed by: NURSE PRACTITIONER

## 2025-06-30 PROCEDURE — 25000003 PHARM REV CODE 250: Mod: ER | Performed by: NURSE PRACTITIONER

## 2025-06-30 PROCEDURE — 81025 URINE PREGNANCY TEST: CPT | Mod: ER | Performed by: NURSE PRACTITIONER

## 2025-06-30 PROCEDURE — 25000003 PHARM REV CODE 250: Mod: ER | Performed by: INTERNAL MEDICINE

## 2025-06-30 PROCEDURE — 87880 STREP A ASSAY W/OPTIC: CPT | Mod: ER

## 2025-06-30 PROCEDURE — 81025 URINE PREGNANCY TEST: CPT | Mod: ER

## 2025-06-30 PROCEDURE — 99284 EMERGENCY DEPT VISIT MOD MDM: CPT | Mod: 25,ER

## 2025-06-30 RX ORDER — AZELASTINE 1 MG/ML
2 SPRAY, METERED NASAL 2 TIMES DAILY
Qty: 30 ML | Refills: 0 | Status: SHIPPED | OUTPATIENT
Start: 2025-06-30 | End: 2025-08-24

## 2025-06-30 RX ORDER — IBUPROFEN 800 MG/1
800 TABLET, FILM COATED ORAL EVERY 8 HOURS PRN
Qty: 30 TABLET | Refills: 0 | Status: SHIPPED | OUTPATIENT
Start: 2025-06-30

## 2025-06-30 RX ORDER — IBUPROFEN 400 MG/1
800 TABLET, FILM COATED ORAL
Status: COMPLETED | OUTPATIENT
Start: 2025-06-30 | End: 2025-06-30

## 2025-06-30 RX ORDER — ACETAMINOPHEN 500 MG
1000 TABLET ORAL
Status: COMPLETED | OUTPATIENT
Start: 2025-06-30 | End: 2025-06-30

## 2025-06-30 RX ORDER — ONDANSETRON 4 MG/1
4 TABLET, ORALLY DISINTEGRATING ORAL EVERY 8 HOURS PRN
Qty: 10 TABLET | Refills: 0 | Status: SHIPPED | OUTPATIENT
Start: 2025-06-30

## 2025-06-30 RX ORDER — FLUTICASONE PROPIONATE 50 MCG
2 SPRAY, SUSPENSION (ML) NASAL DAILY
Qty: 15 G | Refills: 0 | Status: SHIPPED | OUTPATIENT
Start: 2025-06-30

## 2025-06-30 RX ADMIN — IBUPROFEN 800 MG: 400 TABLET ORAL at 08:06

## 2025-06-30 RX ADMIN — ACETAMINOPHEN 1000 MG: 500 TABLET ORAL at 07:06

## 2025-06-30 NOTE — Clinical Note
"Doreen"Amber" Toussaint was seen and treated in our emergency department on 6/30/2025.  She may return to work on 07/03/2025.       If you have any questions or concerns, please don't hesitate to call.      Pauline JC    "

## 2025-07-01 NOTE — ED PROVIDER NOTES
Encounter Date: 2025    SCRIBE #1 NOTE: I, Veronica Low, am scribing for, and in the presence of,  Paul Carrera MD. I have scribed the following portions of the note - Other sections scribed: HPI, ROS, PE.       History     Chief Complaint   Patient presents with    Generalized Body Aches     Pt states that she went to party on Sat, pt states that she has body aches and headache X Saturday evening      Amber S Toussaint is a 39 y.o. female with PMHx of Graves disease and iron deficiency anemia who presents to the ED with generalized myalgias x 2 days. Patient reports associated subjective fevers, chills, decreased appetite, headache, fatigue, and nausea. She did not attempt treatment PTA. She reports her symptoms began after coming back home from a birthday party that same day. Reports her daughter is presenting with similar symptoms. No other exacerbating or alleviating factors. Denies any emesis or other associated symptoms.     The history is provided by the patient. No  was used.     Review of patient's allergies indicates:   Allergen Reactions    Vinyl ether Hives     Past Medical History:   Diagnosis Date    Graves disease 2013    Iron deficiency anemia 2021     Past Surgical History:   Procedure Laterality Date    BREAST SURGERY      Cysts removed     SECTION       SECTION N/A 2023    Procedure:  SECTION;  Surgeon: Fe Armijo MD;  Location: [facility] L&D OR;  Service: OB/GYN;  Laterality: N/A;    DIAGNOSTIC LAPAROSCOPY N/A 2021    Procedure: LAPAROSCOPY, DIAGNOSTIC, possible salpingostomy/ salpingectomy/  D& C;  Surgeon: Cheri Hunter MD;  Location: [facility] OR;  Service: OB/GYN;  Laterality: N/A;    DILATION AND CURETTAGE OF UTERUS  2021    Procedure: DILATION AND CURETTAGE, UTERUS;  Surgeon: Cheri Hunter MD;  Location: [facility] OR;  Service: OB/GYN;;    fibroid removal  ,     breast    HEMANGIOMA EXCISION       right thigh    LAPAROSCOPIC SALPINGECTOMY Right 2021    Procedure: SALPINGECTOMY, LAPAROSCOPIC;  Surgeon: Cheri Hunter MD;  Location: [facility];  Service: OB/GYN;  Laterality: Right;     Family History   Problem Relation Name Age of Onset    Pacemaker/defibrilator Mother      Hypertension Mother      Kidney failure Mother      Hypertension Father      Throat cancer Father      Breast cancer Maternal Grandmother      Hypertension Maternal Grandmother      Pacemaker/defibrilator Maternal Grandfather      Colon cancer Paternal Grandfather      Glaucoma Other       labor Neg Hx      Stroke Neg Hx      Diabetes Neg Hx      Macular degeneration Neg Hx       Social History[1]  Review of Systems   Constitutional:  Positive for appetite change (decreased), chills, fatigue and fever (subjective).   HENT:  Negative for sore throat.    Respiratory:  Negative for shortness of breath.    Cardiovascular:  Negative for chest pain.   Gastrointestinal:  Positive for nausea. Negative for abdominal pain, diarrhea and vomiting.   Genitourinary:  Negative for dysuria.   Musculoskeletal:  Positive for myalgias (generalized). Negative for back pain.   Skin:  Negative for rash.   Neurological:  Positive for headaches. Negative for weakness.   Psychiatric/Behavioral:  Negative for behavioral problems.    All other systems reviewed and are negative.      Physical Exam     Initial Vitals [25]   BP Pulse Resp Temp SpO2   (!) 143/93 (!) 111 18 (!) 103.2 °F (39.6 °C) 99 %      MAP       --         Physical Exam    Nursing note and vitals reviewed.  Constitutional: She appears well-developed and well-nourished.   HENT:   Head: Normocephalic and atraumatic.   Enlarged nasal turbinates noted. Clear nasal discharge noted. Oropharyngeal erythema present. No oropharyngeal exudate or edema.       Eyes: Conjunctivae are normal.   Neck: Neck supple.   Normal range of motion.  Cardiovascular:  Normal rate, regular rhythm and  normal heart sounds.     Exam reveals no gallop and no friction rub.       No murmur heard.  Pulmonary/Chest: Breath sounds normal. No respiratory distress. She has no wheezes. She has no rhonchi. She has no rales.   Abdominal: Abdomen is soft. There is no abdominal tenderness.   Musculoskeletal:         General: No edema. Normal range of motion.      Cervical back: Normal range of motion and neck supple.     Neurological: She is alert and oriented to person, place, and time. GCS score is 15. GCS eye subscore is 4. GCS verbal subscore is 5. GCS motor subscore is 6.   Skin: Skin is warm and dry.   Psychiatric: She has a normal mood and affect.         ED Course   Procedures  Labs Reviewed   POCT URINE PREGNANCY       Result Value    POC Preg Test, Ur Negative       Acceptable Yes     SARS-COV-2 RDRP GENE    POC Rapid COVID Negative       Acceptable Yes     POCT STREP A, RAPID    POC Rapid Strep A negative     POCT RAPID INFLUENZA A/B    Influenza B Ag negative      Inflenza A Ag negative            Imaging Results    None          Medications   acetaminophen tablet 1,000 mg (1,000 mg Oral Given 6/30/25 1927)   ibuprofen tablet 800 mg (800 mg Oral Given 6/30/25 2022)     Medical Decision Making  Amber S Toussaint is a 39 y.o. female with PMHx of Graves disease and iron deficiency anemia who presents to the ED with generalized myalgias x 2 days. Patient reports associated subjective fevers, chills, decreased appetite, headache, fatigue, and nausea. She did not attempt treatment PTA. She reports her symptoms began after coming back home from a birthday party that same day. Reports her daughter is presenting with similar symptoms. No other exacerbating or alleviating factors. Denies any emesis or other associated symptoms.   Course of ED stay:   Rapid flu, rapid COVID, rapid strep and UPT were negative.  Physical exam today reveals evidence of viral URI.  Patient received instructions for  viral URI as well as prescriptions for Astelin/fluticasone/ibuprofen.  Fever resolved prior to discharge (patient received ibuprofen/Tylenol in the ED) and patient was advised to follow up with her primary care physician within the next week for re-evaluation/return to the emergency department if condition worsens.            Amount and/or Complexity of Data Reviewed  Labs: ordered. Decision-making details documented in ED Course.    Risk  Prescription drug management.            Scribe Attestation:   Scribe #1: I performed the above scribed service and the documentation accurately describes the services I performed. I attest to the accuracy of the note.                               Clinical Impression:  Final diagnoses:  [B34.9] Viral syndrome (Primary)       This document was produced by a scribe under my direction and in my presence. I agree with the content of the note and have made any necessary edits.     Dr. Carrera    07/01/2025 3:44 AM     ED Disposition Condition    Discharge Stable          ED Prescriptions       Medication Sig Dispense Start Date End Date Auth. Provider    azelastine (ASTELIN) 137 mcg (0.1 %) nasal spray 2 sprays (274 mcg total) by Nasal route 2 (two) times daily. 30 mL 6/30/2025 8/24/2025 Paul Carrera MD    fluticasone propionate (FLONASE) 50 mcg/actuation nasal spray 2 sprays (100 mcg total) by Each Nostril route once daily. 15 g 6/30/2025 -- Paul Carrera MD    ibuprofen (ADVIL,MOTRIN) 800 MG tablet Take 1 tablet (800 mg total) by mouth every 8 (eight) hours as needed for Pain. 30 tablet 6/30/2025 -- Paul Carrera MD    ondansetron (ZOFRAN-ODT) 4 MG TbDL Take 1 tablet (4 mg total) by mouth every 8 (eight) hours as needed (Nausea). 10 tablet 6/30/2025 -- Paul Carrera MD          Follow-up Information       Follow up With Specialties Details Why Contact Info    Misa Maria MD Internal Medicine, Wound Care Schedule an appointment as soon as possible for a visit  in 3 days For reevaluation [id number] [facility] [address]  [place] [address]  [phone]                     [1]   Social History  Tobacco Use    Smoking status: Never    Smokeless tobacco: Never   Substance Use Topics    Alcohol use: Not Currently     Comment: occasional    Drug use: No        Paul Carrera MD  07/01/25 0346

## 2025-07-09 ENCOUNTER — OFFICE VISIT (OUTPATIENT)
Dept: PRIMARY CARE CLINIC | Facility: CLINIC | Age: 40
End: 2025-07-09
Payer: MEDICAID

## 2025-07-09 VITALS
SYSTOLIC BLOOD PRESSURE: 132 MMHG | DIASTOLIC BLOOD PRESSURE: 90 MMHG | WEIGHT: 176.31 LBS | RESPIRATION RATE: 20 BRPM | HEIGHT: 65 IN | HEART RATE: 91 BPM | BODY MASS INDEX: 29.37 KG/M2 | OXYGEN SATURATION: 98 %

## 2025-07-09 DIAGNOSIS — E05.00 GRAVES DISEASE: ICD-10-CM

## 2025-07-09 DIAGNOSIS — J20.9 ACUTE BRONCHITIS, UNSPECIFIED ORGANISM: Primary | ICD-10-CM

## 2025-07-09 PROCEDURE — 1160F RVW MEDS BY RX/DR IN RCRD: CPT | Mod: CPTII,,, | Performed by: FAMILY MEDICINE

## 2025-07-09 PROCEDURE — 99213 OFFICE O/P EST LOW 20 MIN: CPT | Mod: PBBFAC,PN | Performed by: FAMILY MEDICINE

## 2025-07-09 PROCEDURE — 3075F SYST BP GE 130 - 139MM HG: CPT | Mod: CPTII,,, | Performed by: FAMILY MEDICINE

## 2025-07-09 PROCEDURE — 99214 OFFICE O/P EST MOD 30 MIN: CPT | Mod: S$PBB,,, | Performed by: FAMILY MEDICINE

## 2025-07-09 PROCEDURE — 99999 PR PBB SHADOW E&M-EST. PATIENT-LVL III: CPT | Mod: PBBFAC,,, | Performed by: FAMILY MEDICINE

## 2025-07-09 PROCEDURE — 3008F BODY MASS INDEX DOCD: CPT | Mod: CPTII,,, | Performed by: FAMILY MEDICINE

## 2025-07-09 PROCEDURE — 3080F DIAST BP >= 90 MM HG: CPT | Mod: CPTII,,, | Performed by: FAMILY MEDICINE

## 2025-07-09 PROCEDURE — 1159F MED LIST DOCD IN RCRD: CPT | Mod: CPTII,,, | Performed by: FAMILY MEDICINE

## 2025-07-09 RX ORDER — AZITHROMYCIN 250 MG/1
TABLET, FILM COATED ORAL
Qty: 6 TABLET | Refills: 0 | Status: SHIPPED | OUTPATIENT
Start: 2025-07-09 | End: 2025-07-14

## 2025-07-09 RX ORDER — GUAIFENESIN AND DEXTROMETHORPHAN HYDROBROMIDE 1200; 60 MG/1; MG/1
1 TABLET, EXTENDED RELEASE ORAL 2 TIMES DAILY
Qty: 20 TABLET | Refills: 0 | Status: SHIPPED | OUTPATIENT
Start: 2025-07-09 | End: 2025-07-19

## 2025-07-09 RX ORDER — CODEINE PHOSPHATE AND GUAIFENESIN 10; 100 MG/5ML; MG/5ML
5 SOLUTION ORAL EVERY 6 HOURS PRN
Qty: 118 ML | Refills: 0 | Status: SHIPPED | OUTPATIENT
Start: 2025-07-09

## 2025-07-09 NOTE — PROGRESS NOTES
Subjective:       Patient ID: Amber S Toussaint is a 39 y.o. female.    Chief Complaint: Cough (With sore throat and headaches associated and have lingered for about 1 week and a half now, she also reports with being tested for COVID, Flu, and strep with her results being negative. Patient endorses tx with tylenol and ibuprofen however, her symptoms have worsened with shortness of breath. Patient also request for a full panel of blood work, she states that she has stop breast feeding and would like to check her hormones as well. )    History of Present Illness    CHIEF COMPLAINT:  Patient presents today for persistent symptoms following illness that started June 28th    HISTORY OF PRESENT ILLNESS:  She reports initial illness symptoms beginning on June 28th characterized by extreme lethargy, high fever of 103.2°F lasting two days, headache, chills, congestion, and sore throat. She was evaluated in the emergency room and underwent testing for strep, flu, and COVID-19, with all tests returning negative. She currently experiences persistent symptoms including a thick, hard cough that causes body tingling, difficulty fully inflating her lungs, intermittent shortness of breath that comes in waves, persistent headaches, ongoing congestion, and sore throat. Sputum color has changed from bright green to a lighter green. Coughing is exacerbated when attempting to take deep breaths, triggering additional coughing episodes. Her fever has resolved and she denies current fever but reports feeling generally unwell with no improvement in overall condition since initial illness onset.    MEDICAL HISTORY:  She has a history of Graves disease since 2015, currently not taking any medications for the condition. She last saw her endocrinologist in 2023. She has a complex reproductive history including a tubal ligation performed after an ectopic pregnancy and a subsequent miscarriage.    ENDOCRINE:  She recently stopped breastfeeding and  is experiencing hormone-related symptoms including facial breakouts and hair loss. She expresses concern about hormonal imbalance and acknowledges not having been proactive about her healthcare, seeking comprehensive medical evaluation.      ROS:  General: +fever, +chills  ENT: +nasal congestion, +sore throat  Respiratory: +cough, +shortness of breath, +difficulty breathing  Skin: +acne, +abnormal hair loss  Neurological: +headache  Head: +head pain          Review of patient's allergies indicates:   Allergen Reactions    Vinyl ether Hives       Current Medications[1]    Past Medical History:   Diagnosis Date    Graves disease 2013    Iron deficiency anemia 2021      Past Surgical History:   Procedure Laterality Date    BREAST SURGERY      Cysts removed     SECTION       SECTION N/A 2023    Procedure:  SECTION;  Surgeon: Fe Armijo MD;  Location: [facility] L&D OR;  Service: OB/GYN;  Laterality: N/A;    DIAGNOSTIC LAPAROSCOPY N/A 2021    Procedure: LAPAROSCOPY, DIAGNOSTIC, possible salpingostomy/ salpingectomy/  D& C;  Surgeon: Cheri Hunter MD;  Location: [facility] OR;  Service: OB/GYN;  Laterality: N/A;    DILATION AND CURETTAGE OF UTERUS  2021    Procedure: DILATION AND CURETTAGE, UTERUS;  Surgeon: Cheri Hunter MD;  Location: [facility] OR;  Service: OB/GYN;;    fibroid removal  ,     breast    HEMANGIOMA EXCISION      right thigh    LAPAROSCOPIC SALPINGECTOMY Right 2021    Procedure: SALPINGECTOMY, LAPAROSCOPIC;  Surgeon: Cheri Hunter MD;  Location: [facility] OR;  Service: OB/GYN;  Laterality: Right;      Social History     Socioeconomic History    Marital status:    Tobacco Use    Smoking status: Never     Passive exposure: Never    Smokeless tobacco: Never   Substance and Sexual Activity    Alcohol use: Not Currently     Comment: occasional    Drug use: No    Sexual activity: Yes     Partners: Male     Social Drivers of Health      Financial Resource Strain: Low Risk  (2025)    Overall Financial Resource Strain (CARDIA)     Difficulty of Paying Living Expenses: Not hard at all   Food Insecurity: No Food Insecurity (2025)    Hunger Vital Sign     Worried About Running Out of Food in the Last Year: Never true     Ran Out of Food in the Last Year: Never true   Transportation Needs: No Transportation Needs (2025)    PRAPARE - Transportation     Lack of Transportation (Medical): No     Lack of Transportation (Non-Medical): No   Physical Activity: Sufficiently Active (2025)    Exercise Vital Sign     Days of Exercise per Week: 5 days     Minutes of Exercise per Session: 60 min   Stress: Stress Concern Present (2025)    [employer] - Occupational Stress Questionnaire     Feeling of Stress : To some extent   Housing Stability: Low Risk  (2025)    Housing Stability Vital Sign     Unable to Pay for Housing in the Last Year: No     Number of Times Moved in the Last Year: 0     Homeless in the Last Year: No      Family History   Problem Relation Name Age of Onset    Pacemaker/defibrilator Mother      Hypertension Mother      Kidney failure Mother      Hypertension Father      Throat cancer Father      Breast cancer Maternal Grandmother      Hypertension Maternal Grandmother      Pacemaker/defibrilator Maternal Grandfather      Colon cancer Paternal Grandfather      Glaucoma Other       labor Neg Hx      Stroke Neg Hx      Diabetes Neg Hx      Macular degeneration Neg Hx       Review of Systems    Objective:      Physical Exam    General: No acute distress. Well-developed. Well-nourished.  Eyes: EOMI. Sclerae anicteric.  HENT: Normocephalic. Atraumatic. Nares patent. Moist oral mucosa. Pharyngeal erythema.  Cardiovascular: Regular rate. Regular rhythm. No murmurs. No rubs. No gallops. Normal S1, S2.  Respiratory: Normal respiratory effort. Clear to auscultation bilaterally. No rales. No  rhonchi. No wheezing.  Musculoskeletal: No  obvious deformity.  Extremities: No lower extremity edema.  Neurological: Alert & oriented x3. No slurred speech. Normal gait.  Psychiatric: Normal mood. Normal affect. Good insight. Good judgment.  Skin: Warm. Dry. No rash.          Assessment:       1. Acute bronchitis, unspecified organism    2. Graves disease        Plan:         Doreen was seen today for cough.    Diagnoses and all orders for this visit:    Acute bronchitis, unspecified organism  Suspect bacterial.  -     azithromycin (Z-LENNOX) 250 MG tablet; Take 2 tablets by mouth on day 1; Take 1 tablet by mouth on days 2-5  -     Yaneth-Barr Virus (EBV) Antibody Panel; Future  -     guaiFENesin-codeine 100-10 mg/5 ml (TUSSI-ORGANIDIN NR)  mg/5 mL syrup; Take 5 mLs by mouth every 6 (six) hours as needed for Cough.  -     dextromethorphan-guaiFENesin (MUCINEX DM) 60-1,200 mg per 12 hr tablet; Take 1 tablet by mouth 2 (two) times daily. for 10 days    Graves disease  -     T4, Free; Future  -     TSH; Future  -     Comprehensive Metabolic Panel; Future  -     CBC Auto Differential; Future        Assessment & Plan    MEDICATIONS:  - Started Z-Lennox (azithromycin) for potential bacterial infection.  - Started Musinex DM for cough relief.    ORDERS:  - Ordered comprehensive blood panel including CBC and thyroid function tests due to history of Graves' disease and recent cessation of breastfeeding.  - Ordered strep throat test.    REFERRALS:  - Follow up with endocrinologist for Graves' disease management.    FOLLOW UP:  - Follow up for routine checkup appointment to discuss lab results.          Elke Manjarrez MD        This note was generated with the assistance of ambient listening technology. Verbal consent was obtained by the patient and accompanying visitor(s) for the recording of patient appointment to facilitate this note. I attest to having reviewed and edited the generated note for accuracy, though some  syntax or spelling errors may persist. Please contact the author of this note for any clarification.            [1]   Current Outpatient Medications:     azelastine (ASTELIN) 137 mcg (0.1 %) nasal spray, 2 sprays (274 mcg total) by Nasal route 2 (two) times daily., Disp: 30 mL, Rfl: 0    fluticasone propionate (FLONASE) 50 mcg/actuation nasal spray, 2 sprays (100 mcg total) by Each Nostril route once daily., Disp: 15 g, Rfl: 0    ibuprofen (ADVIL,MOTRIN) 800 MG tablet, Take 1 tablet (800 mg total) by mouth every 8 (eight) hours as needed for Pain., Disp: 30 tablet, Rfl: 0    ondansetron (ZOFRAN-ODT) 4 MG TbDL, Take 1 tablet (4 mg total) by mouth every 8 (eight) hours as needed (Nausea)., Disp: 10 tablet, Rfl: 0    azithromycin (Z-WILBERT) 250 MG tablet, Take 2 tablets by mouth on day 1; Take 1 tablet by mouth on days 2-5, Disp: 6 tablet, Rfl: 0    dextromethorphan-guaiFENesin (MUCINEX DM) 60-1,200 mg per 12 hr tablet, Take 1 tablet by mouth 2 (two) times daily. for 10 days, Disp: 20 tablet, Rfl: 0    guaiFENesin-codeine 100-10 mg/5 ml (TUSSI-ORGANIDIN NR)  mg/5 mL syrup, Take 5 mLs by mouth every 6 (six) hours as needed for Cough., Disp: 118 mL, Rfl: 0

## 2025-07-23 ENCOUNTER — OFFICE VISIT (OUTPATIENT)
Dept: FAMILY MEDICINE | Facility: CLINIC | Age: 40
End: 2025-07-23
Payer: MEDICAID

## 2025-07-23 ENCOUNTER — LAB VISIT (OUTPATIENT)
Dept: LAB | Facility: HOSPITAL | Age: 40
End: 2025-07-23
Attending: INTERNAL MEDICINE
Payer: MEDICAID

## 2025-07-23 VITALS
TEMPERATURE: 99 F | OXYGEN SATURATION: 99 % | SYSTOLIC BLOOD PRESSURE: 134 MMHG | HEART RATE: 73 BPM | DIASTOLIC BLOOD PRESSURE: 78 MMHG | BODY MASS INDEX: 29.61 KG/M2 | WEIGHT: 177.94 LBS

## 2025-07-23 DIAGNOSIS — E55.9 VITAMIN D DEFICIENCY: ICD-10-CM

## 2025-07-23 DIAGNOSIS — D50.9 IRON DEFICIENCY ANEMIA, UNSPECIFIED IRON DEFICIENCY ANEMIA TYPE: ICD-10-CM

## 2025-07-23 DIAGNOSIS — R51.9 FREQUENT HEADACHES: ICD-10-CM

## 2025-07-23 DIAGNOSIS — Z00.00 ANNUAL PHYSICAL EXAM: Primary | ICD-10-CM

## 2025-07-23 DIAGNOSIS — L20.9 ATOPIC DERMATITIS OF FACE: ICD-10-CM

## 2025-07-23 DIAGNOSIS — Z00.00 ANNUAL PHYSICAL EXAM: ICD-10-CM

## 2025-07-23 LAB
ABSOLUTE EOSINOPHIL (OHS): 0.2 K/UL
ABSOLUTE MONOCYTE (OHS): 0.51 K/UL (ref 0.3–1)
ABSOLUTE NEUTROPHIL COUNT (OHS): 2.88 K/UL (ref 1.8–7.7)
BASOPHILS # BLD AUTO: 0.03 K/UL
BASOPHILS NFR BLD AUTO: 0.5 %
CHOLEST SERPL-MCNC: 126 MG/DL (ref 120–199)
CHOLEST/HDLC SERPL: 2.7 {RATIO} (ref 2–5)
ERYTHROCYTE [DISTWIDTH] IN BLOOD BY AUTOMATED COUNT: 14.7 % (ref 11.5–14.5)
FERRITIN SERPL-MCNC: 24.9 NG/ML (ref 20–300)
HCT VFR BLD AUTO: 38.8 % (ref 37–48.5)
HDLC SERPL-MCNC: 46 MG/DL (ref 40–75)
HDLC SERPL: 36.5 % (ref 20–50)
HGB BLD-MCNC: 11.6 GM/DL (ref 12–16)
IMM GRANULOCYTES # BLD AUTO: 0.01 K/UL (ref 0–0.04)
IMM GRANULOCYTES NFR BLD AUTO: 0.2 % (ref 0–0.5)
IRON SATN MFR SERPL: 15 % (ref 20–50)
IRON SERPL-MCNC: 64 UG/DL (ref 30–160)
LDLC SERPL CALC-MCNC: 68.2 MG/DL (ref 63–159)
LYMPHOCYTES # BLD AUTO: 2.67 K/UL (ref 1–4.8)
MCH RBC QN AUTO: 24.5 PG (ref 27–31)
MCHC RBC AUTO-ENTMCNC: 29.9 G/DL (ref 32–36)
MCV RBC AUTO: 82 FL (ref 82–98)
NONHDLC SERPL-MCNC: 80 MG/DL
NUCLEATED RBC (/100WBC) (OHS): 0 /100 WBC
PLATELET # BLD AUTO: 386 K/UL (ref 150–450)
PMV BLD AUTO: 10.4 FL (ref 9.2–12.9)
RBC # BLD AUTO: 4.73 M/UL (ref 4–5.4)
RELATIVE EOSINOPHIL (OHS): 3.2 %
RELATIVE LYMPHOCYTE (OHS): 42.4 % (ref 18–48)
RELATIVE MONOCYTE (OHS): 8.1 % (ref 4–15)
RELATIVE NEUTROPHIL (OHS): 45.6 % (ref 38–73)
TIBC SERPL-MCNC: 431 UG/DL (ref 250–450)
TRANSFERRIN SERPL-MCNC: 291 MG/DL (ref 200–375)
TRIGL SERPL-MCNC: 59 MG/DL (ref 30–150)
WBC # BLD AUTO: 6.3 K/UL (ref 3.9–12.7)

## 2025-07-23 PROCEDURE — 85025 COMPLETE CBC W/AUTO DIFF WBC: CPT

## 2025-07-23 PROCEDURE — 3078F DIAST BP <80 MM HG: CPT | Mod: CPTII,,, | Performed by: INTERNAL MEDICINE

## 2025-07-23 PROCEDURE — 82746 ASSAY OF FOLIC ACID SERUM: CPT

## 2025-07-23 PROCEDURE — 3008F BODY MASS INDEX DOCD: CPT | Mod: CPTII,,, | Performed by: INTERNAL MEDICINE

## 2025-07-23 PROCEDURE — 84466 ASSAY OF TRANSFERRIN: CPT

## 2025-07-23 PROCEDURE — 3075F SYST BP GE 130 - 139MM HG: CPT | Mod: CPTII,,, | Performed by: INTERNAL MEDICINE

## 2025-07-23 PROCEDURE — 1160F RVW MEDS BY RX/DR IN RCRD: CPT | Mod: CPTII,,, | Performed by: INTERNAL MEDICINE

## 2025-07-23 PROCEDURE — 99385 PREV VISIT NEW AGE 18-39: CPT | Mod: S$PBB,,, | Performed by: INTERNAL MEDICINE

## 2025-07-23 PROCEDURE — 82728 ASSAY OF FERRITIN: CPT

## 2025-07-23 PROCEDURE — 82306 VITAMIN D 25 HYDROXY: CPT

## 2025-07-23 PROCEDURE — 82607 VITAMIN B-12: CPT

## 2025-07-23 PROCEDURE — 80061 LIPID PANEL: CPT

## 2025-07-23 PROCEDURE — 99999 PR PBB SHADOW E&M-EST. PATIENT-LVL III: CPT | Mod: PBBFAC,,, | Performed by: INTERNAL MEDICINE

## 2025-07-23 PROCEDURE — 1159F MED LIST DOCD IN RCRD: CPT | Mod: CPTII,,, | Performed by: INTERNAL MEDICINE

## 2025-07-23 PROCEDURE — 99213 OFFICE O/P EST LOW 20 MIN: CPT | Mod: PBBFAC,PO | Performed by: INTERNAL MEDICINE

## 2025-07-23 PROCEDURE — 83036 HEMOGLOBIN GLYCOSYLATED A1C: CPT

## 2025-07-23 PROCEDURE — 36415 COLL VENOUS BLD VENIPUNCTURE: CPT | Mod: PO

## 2025-07-23 RX ORDER — DESONIDE 0.5 MG/G
CREAM TOPICAL 2 TIMES DAILY
Qty: 15 G | Refills: 2 | Status: SHIPPED | OUTPATIENT
Start: 2025-07-23

## 2025-07-23 NOTE — PROGRESS NOTES
SUBJECTIVE     No chief complaint on file.      HPI  Amber S Toussaint is a 39 y.o. female with multiple medical diagnoses as listed in the medical history and problem list that presents for annual exam. Pt has been doing well since her last visit, but she has been recently diagnosed with EBV. She has an okay appetite She does exercise by walking and bike riding, but stopped recently after infection. She sleeps for ~3 hours nightly. Pt does not take OTC supplements. She does have any current stressors and does not have an outlet. Pt is UTD on age appropriate CA screening.    PAST MEDICAL HISTORY:  Past Medical History:   Diagnosis Date    Graves disease     Iron deficiency anemia 2021       PAST SURGICAL HISTORY:  Past Surgical History:   Procedure Laterality Date    BREAST SURGERY      Cysts removed     SECTION       SECTION N/A 2023    Procedure:  SECTION;  Surgeon: Fe Armijo MD;  Location: [facility];  Service: OB/GYN;  Laterality: N/A;    DIAGNOSTIC LAPAROSCOPY N/A 2021    Procedure: LAPAROSCOPY, DIAGNOSTIC, possible salpingostomy/ salpingectomy/  D& C;  Surgeon: Cheri Hunter MD;  Location: [facility];  Service: OB/GYN;  Laterality: N/A;    DILATION AND CURETTAGE OF UTERUS  2021    Procedure: DILATION AND CURETTAGE, UTERUS;  Surgeon: Cheri Hunter MD;  Location: [facility] [place] [facility];  Service: OB/GYN;;    fibroid removal  ,     breast    HEMANGIOMA EXCISION      right thigh    LAPAROSCOPIC SALPINGECTOMY Right 2021    Procedure: SALPINGECTOMY, LAPAROSCOPIC;  Surgeon: Cheri Hunter MD;  Location: [facility] [place] [facility];  Service: OB/GYN;  Laterality: Right;       SOCIAL HISTORY:  Social History[1]    FAMILY HISTORY:  Family History   Problem Relation Name Age of Onset    Pacemaker/defibrilator Mother      Hypertension Mother      Kidney failure Mother      Hypertension Father      Throat cancer Father      Breast cancer Maternal Grandmother       Hypertension Maternal Grandmother      Pacemaker/defibrilator Maternal Grandfather      Colon cancer Paternal Grandfather      Glaucoma Other       labor Neg Hx      Stroke Neg Hx      Diabetes Neg Hx      Macular degeneration Neg Hx         ALLERGIES AND MEDICATIONS: updated and reviewed.  Review of patient's allergies indicates:   Allergen Reactions    Vinyl ether Hives     Current Medications[2]    ROS  Review of Systems   Constitutional:  Negative for chills and fever.   HENT:  Positive for sore throat. Negative for hearing loss.    Eyes:  Negative for visual disturbance.   Respiratory:  Negative for cough and shortness of breath.    Cardiovascular:  Negative for chest pain, palpitations and leg swelling.   Gastrointestinal:  Positive for nausea. Negative for abdominal pain, constipation, diarrhea and vomiting.   Genitourinary:  Negative for dysuria, frequency and urgency.   Musculoskeletal:  Negative for arthralgias, joint swelling and myalgias.   Skin:  Negative for rash and wound.   Neurological:  Positive for headaches.   Psychiatric/Behavioral:  Positive for sleep disturbance. Negative for agitation and confusion. The patient is not nervous/anxious.          OBJECTIVE     Physical Exam  Vitals:    25 1113   BP: 134/78   Pulse: 73   Temp: 98.5 °F (36.9 °C)    Body mass index is 29.61 kg/m².  Weight: 80.7 kg (177 lb 14.6 oz)         Physical Exam  Constitutional:       General: She is not in acute distress.     Appearance: She is well-developed.   HENT:      Head: Normocephalic and atraumatic.      Right Ear: Tympanic membrane, ear canal and external ear normal.      Left Ear: Tympanic membrane, ear canal and external ear normal.      Nose: Nose normal.   Eyes:      General: No scleral icterus.        Right eye: No discharge.         Left eye: No discharge.      Conjunctiva/sclera: Conjunctivae normal.   Neck:      Vascular: No JVD.      Trachea: No tracheal deviation.   Cardiovascular:      Rate  and Rhythm: Normal rate and regular rhythm.      Heart sounds: No murmur heard.     No friction rub. No gallop.   Pulmonary:      Effort: Pulmonary effort is normal. No respiratory distress.      Breath sounds: Normal breath sounds. No wheezing.   Abdominal:      General: Bowel sounds are normal. There is no distension.      Palpations: Abdomen is soft. There is no mass.      Tenderness: There is no abdominal tenderness. There is no guarding or rebound.   Musculoskeletal:         General: No tenderness or deformity. Normal range of motion.      Cervical back: Normal range of motion and neck supple.   Skin:     General: Skin is warm and dry.      Findings: No erythema or rash.   Neurological:      Mental Status: She is alert and oriented to person, place, and time.      Motor: No abnormal muscle tone.      Coordination: Coordination normal.   Psychiatric:         Behavior: Behavior normal.         Thought Content: Thought content normal.         Judgment: Judgment normal.           Health Maintenance         Date Due Completion Date    Pneumococcal Vaccines (Age 0-49) (1 of 2 - PCV) Never done ---    COVID-19 Vaccine (3 - 2024-25 season) 09/01/2024 10/18/2021    Influenza Vaccine (1) 09/01/2025 ---    Cervical Cancer Screening 02/12/2030 2/12/2025    TETANUS VACCINE 10/09/2033 10/9/2023    RSV Vaccine (Age 60+ and Pregnant patients) (1 - 1-dose 75+ series) 12/04/2062 ---              ASSESSMENT     39 y.o. female with     1. Annual physical exam    2. Vitamin D deficiency    3. Iron deficiency anemia, unspecified iron deficiency anemia type    4. Frequent headaches    5. Atopic dermatitis of face        PLAN:     1. Annual physical exam  - Counseled on age appropriate medical preventative services, including age appropriate cancer screenings, over all nutritional health, need for a consistent exercise regimen and an over all push towards maintaining a vigorous and active lifestyle.  Counseled on age appropriate  vaccines and discussed upcoming health care needs based on age/gender.  Spent time with patient counseling on need for a good patient/doctor relationship moving forward.  Discussed use of common OTC medications and supplements.  Discussed common dietary aids and use of caffeine and the need for good sleep hygiene and stress management.  - CBC Auto Differential; Future  - Hemoglobin A1C; Future  - Lipid Panel; Future  - Vitamin D; Future    2. Vitamin D deficiency  - Vitamin D; Future    3. Iron deficiency anemia, unspecified iron deficiency anemia type  - Iron and TIBC; Future  - Ferritin; Future    4. Frequent headaches  - Vitamin B12; Future  - Folate; Future    5. Atopic dermatitis of face  - Pt to start desonide sparingly and discontinue use if no improvement after 4 weeks; she voiced understanding  - desonide (DESOWEN) 0.05 % cream; Apply topically 2 (two) times daily.  Dispense: 15 g; Refill: 2        RTC in 1 year     Misa Maria MD  07/23/2025 11:31 AM        No follow-ups on file.                       [1]   Social History  Socioeconomic History    Marital status:    Tobacco Use    Smoking status: Never     Passive exposure: Never    Smokeless tobacco: Never   Substance and Sexual Activity    Alcohol use: Not Currently     Comment: occasional    Drug use: No    Sexual activity: Yes     Partners: Male     Social Drivers of Health     Financial Resource Strain: Low Risk  (7/9/2025)    Overall Financial Resource Strain (CARDIA)     Difficulty of Paying Living Expenses: Not hard at all   Food Insecurity: No Food Insecurity (7/9/2025)    Hunger Vital Sign     Worried About Running Out of Food in the Last Year: Never true     Ran Out of Food in the Last Year: Never true   Transportation Needs: No Transportation Needs (7/9/2025)    PRAPARE - Transportation     Lack of Transportation (Medical): No     Lack of Transportation (Non-Medical): No   Physical Activity: Sufficiently Active (7/9/2025)    Exercise  Vital Sign     Days of Exercise per Week: 5 days     Minutes of Exercise per Session: 60 min   Stress: Stress Concern Present (7/9/2025)    [employer] - Occupational Stress Questionnaire     Feeling of Stress : To some extent   Housing Stability: Low Risk  (7/9/2025)    Housing Stability Vital Sign     Unable to Pay for Housing in the Last Year: No     Number of Times Moved in the Last Year: 0     Homeless in the Last Year: No   [2]   Current Outpatient Medications   Medication Sig Dispense Refill    guaiFENesin-codeine 100-10 mg/5 ml (TUSSI-ORGANIDIN NR)  mg/5 mL syrup Take 5 mLs by mouth every 6 (six) hours as needed for Cough. 118 mL 0    ondansetron (ZOFRAN-ODT) 4 MG TbDL Take 1 tablet (4 mg total) by mouth every 8 (eight) hours as needed (Nausea). 10 tablet 0    azelastine (ASTELIN) 137 mcg (0.1 %) nasal spray 2 sprays (274 mcg total) by Nasal route 2 (two) times daily. (Patient not taking: Reported on 7/23/2025) 30 mL 0    desonide (DESOWEN) 0.05 % cream Apply topically 2 (two) times daily. 15 g 2    fluticasone propionate (FLONASE) 50 mcg/actuation nasal spray 2 sprays (100 mcg total) by Each Nostril route once daily. (Patient not taking: Reported on 7/23/2025) 15 g 0    ibuprofen (ADVIL,MOTRIN) 800 MG tablet Take 1 tablet (800 mg total) by mouth every 8 (eight) hours as needed for Pain. (Patient not taking: Reported on 7/23/2025) 30 tablet 0     No current facility-administered medications for this visit.

## 2025-07-24 LAB
25(OH)D3+25(OH)D2 SERPL-MCNC: 18 NG/ML (ref 30–96)
EAG (OHS): 114 MG/DL (ref 68–131)
FOLATE SERPL-MCNC: 9.8 NG/ML (ref 4–24)
HBA1C MFR BLD: 5.6 % (ref 4–5.6)
VIT B12 SERPL-MCNC: 513 PG/ML (ref 210–950)

## (undated) DEVICE — UNDERGLOVES BIOGEL PI SZ 7 LF

## (undated) DEVICE — GLOVE BIOGEL SKINSENSE PI 6.5

## (undated) DEVICE — ELECTRODE REM PLYHSV RETURN 9

## (undated) DEVICE — TIP RUMI WHITE DISP UMW676

## (undated) DEVICE — SOL 9P NACL IRR PIC IL

## (undated) DEVICE — SEALER LIGASURE MARYLAND 37CM

## (undated) DEVICE — SYS SEE SHARP SCOPE ANTIFOG

## (undated) DEVICE — JELLY SURGILUBE 5GR

## (undated) DEVICE — KIT WING PAD POSITIONING

## (undated) DEVICE — IRRIGATOR ENDOSCOPY DISP.

## (undated) DEVICE — SYR 10CC LUER LOCK

## (undated) DEVICE — PACK LAPAROSCOPY BAPTIST

## (undated) DEVICE — NDL INSUF ULTRA VERESS 120MM

## (undated) DEVICE — SUT MCRYL PLUS 4-0 PS2 27IN

## (undated) DEVICE — PENCIL ELECTROSURG HOLST W/BLD

## (undated) DEVICE — TROCAR KII FIOS 5MM X 100MM

## (undated) DEVICE — ELECTRODE NEEDLE 1IN

## (undated) DEVICE — ELECTRODE NDL 13 1/2L

## (undated) DEVICE — DRESSING AQUACEL AG ADV 3.5X12

## (undated) DEVICE — SOL NS 1000CC

## (undated) DEVICE — NDL HYPO REG 25G X 1 1/2